# Patient Record
Sex: FEMALE | Race: WHITE | NOT HISPANIC OR LATINO | Employment: OTHER | URBAN - NONMETROPOLITAN AREA
[De-identification: names, ages, dates, MRNs, and addresses within clinical notes are randomized per-mention and may not be internally consistent; named-entity substitution may affect disease eponyms.]

---

## 2017-02-09 ENCOUNTER — OFFICE VISIT (OUTPATIENT)
Dept: VASCULAR SURGERY | Facility: CLINIC | Age: 67
End: 2017-02-09

## 2017-02-09 VITALS
SYSTOLIC BLOOD PRESSURE: 128 MMHG | DIASTOLIC BLOOD PRESSURE: 76 MMHG | WEIGHT: 210 LBS | HEIGHT: 66 IN | HEART RATE: 88 BPM | BODY MASS INDEX: 33.75 KG/M2

## 2017-02-09 DIAGNOSIS — E78.5 HYPERLIPIDEMIA, UNSPECIFIED HYPERLIPIDEMIA TYPE: ICD-10-CM

## 2017-02-09 DIAGNOSIS — I10 ESSENTIAL HYPERTENSION: ICD-10-CM

## 2017-02-09 DIAGNOSIS — I65.23 BILATERAL CAROTID ARTERY STENOSIS: Primary | ICD-10-CM

## 2017-02-09 PROCEDURE — 99204 OFFICE O/P NEW MOD 45 MIN: CPT | Performed by: NURSE PRACTITIONER

## 2017-02-09 RX ORDER — ATORVASTATIN CALCIUM 10 MG/1
10 TABLET, FILM COATED ORAL DAILY
COMMUNITY
Start: 2016-12-19

## 2017-02-09 RX ORDER — NITROFURANTOIN 25; 75 MG/1; MG/1
CAPSULE ORAL
COMMUNITY
Start: 2017-02-06 | End: 2020-02-05

## 2017-02-09 RX ORDER — INDAPAMIDE 2.5 MG/1
1.25 TABLET, FILM COATED ORAL DAILY
COMMUNITY
Start: 2016-12-13 | End: 2018-02-12 | Stop reason: DRUGHIGH

## 2017-02-09 RX ORDER — CYCLOBENZAPRINE HCL 5 MG
5 TABLET ORAL 2 TIMES DAILY PRN
COMMUNITY
Start: 2016-11-10

## 2017-02-09 RX ORDER — MULTIVIT-MIN/IRON/FOLIC ACID/K 18-600-40
CAPSULE ORAL
COMMUNITY
End: 2022-11-28

## 2017-02-09 RX ORDER — ROPINIROLE 1 MG/1
1 TABLET, FILM COATED ORAL NIGHTLY
COMMUNITY
Start: 2017-01-05

## 2017-02-09 RX ORDER — VALSARTAN 320 MG/1
320 TABLET ORAL
COMMUNITY
Start: 2017-01-24 | End: 2019-01-30 | Stop reason: ALTCHOICE

## 2017-02-09 RX ORDER — PANTOPRAZOLE SODIUM 40 MG/1
TABLET, DELAYED RELEASE ORAL
COMMUNITY
Start: 2017-01-19 | End: 2018-02-12 | Stop reason: ALTCHOICE

## 2017-02-09 RX ORDER — NAPROXEN SODIUM 220 MG
220 TABLET ORAL EVERY 12 HOURS PRN
COMMUNITY
End: 2018-02-12 | Stop reason: ALTCHOICE

## 2017-02-09 RX ORDER — ASPIRIN 81 MG/1
TABLET ORAL
COMMUNITY
Start: 2017-01-24

## 2017-02-09 RX ORDER — SITAGLIPTIN 50 MG/1
50 TABLET, FILM COATED ORAL DAILY
COMMUNITY
Start: 2016-12-28 | End: 2021-09-20

## 2017-02-09 RX ORDER — CELECOXIB 200 MG/1
CAPSULE ORAL
COMMUNITY
Start: 2016-12-28 | End: 2018-02-12 | Stop reason: ALTCHOICE

## 2017-02-09 NOTE — PROGRESS NOTES
02/09/2017      GUNNAR Dominguez  [address] [place] [address]    Radha Rodriguez  1950    Chief Complaint   Patient presents with   • Carotid Artery Disease     Denies COD symptoms       Dear GUNNAR Dominguez:      HPI  I had the pleasure of seeing your patient Radha Rodriguez in the office today.  Thank you kindly for this consultation.  As you recall, Radha Rodriguez is a 67 y.o.  female who you are currently following for routine health maintenance.  She was having an annual exam and noticed to have a soft right carotid bruit, and was sent for testing.  She denies any strokelike symptoms.  She denies any history of stroke.  She did have noninvasive testing performed, which I did review in office.    Past Medical History   Diagnosis Date   • Anemia    • Carotid bruit    • Diabetes mellitus    • Hyperlipidemia    • Hypertension        Past Surgical History   Procedure Laterality Date   • Colonoscopy     • Total knee arthroplasty Right    • Ankle surgery Left      fracture repair       Family History   Problem Relation Age of Onset   • Cancer Mother      colon   • Diabetes Brother        Social History     Social History   • Marital status:      Spouse name: N/A   • Number of children: N/A   • Years of education: N/A     Occupational History   • Not on file.     Social History Main Topics   • Smoking status: Never Smoker   • Smokeless tobacco: Never Used   • Alcohol use No   • Drug use: No   • Sexual activity: Not on file     Other Topics Concern   • Not on file     Social History Narrative       No Known Allergies    Prior to Admission medications    Medication Sig Start Date End Date Taking? Authorizing Provider   aspirin 81 MG EC tablet  1/24/17  Yes Historical Provider, MD   atorvastatin (LIPITOR) 10 MG tablet  12/19/16  Yes Historical Provider, MD   celecoxib (CeleBREX) 200 MG capsule  12/28/16  Yes Historical Provider, MD   Cholecalciferol (VITAMIN D) 2000 UNITS capsule Take  by mouth.   Yes  "Historical Provider, MD   Coenzyme Q10 (CO Q 10 PO) Take  by mouth.   Yes Historical Provider, MD   cyclobenzaprine (FLEXERIL) 5 MG tablet  11/10/16  Yes Historical Provider, MD   indapamide (LOZOL) 2.5 MG tablet  12/13/16  Yes Historical Provider, MD   JANUVIA 100 MG tablet  12/28/16  Yes Historical Provider, MD   metFORMIN (GLUCOPHAGE) 500 MG tablet  2/6/17  Yes Historical Provider, MD   naproxen sodium (ALEVE) 220 MG tablet Take 220 mg by mouth Every 12 (Twelve) Hours As Needed for mild pain (1-3).   Yes Historical Provider, MD   nitrofurantoin, macrocrystal-monohydrate, (MACROBID) 100 MG capsule  2/6/17  Yes Historical Provider, MD   pantoprazole (PROTONIX) 40 MG EC tablet  1/19/17  Yes Historical Provider, MD   Probiotic Product (PROBIOTIC PO) Take  by mouth.   Yes Historical Provider, MD   rOPINIRole (REQUIP) 1 MG tablet  1/5/17  Yes Historical Provider, MD   valsartan (DIOVAN) 320 MG tablet  1/24/17  Yes Historical Provider, MD       Review of Systems   Constitutional: Negative.    HENT: Negative.    Eyes: Negative.    Respiratory: Negative.    Cardiovascular: Negative.    Gastrointestinal: Negative.    Endocrine: Negative.    Genitourinary: Negative.    Musculoskeletal: Negative.    Skin: Negative.    Allergic/Immunologic: Negative.    Neurological: Negative.    Hematological: Negative.    Psychiatric/Behavioral: Negative.        Visit Vitals   • /76   • Pulse 88   • Ht 66\" (167.6 cm)   • Wt 210 lb (95.3 kg)   • BMI 33.89 kg/m2     Physical Exam   Constitutional: She is oriented to person, place, and time. She appears well-developed and well-nourished. No distress.   HENT:   Head: Normocephalic and atraumatic.   Mouth/Throat: Oropharynx is clear and moist.   Eyes: Pupils are equal, round, and reactive to light. No scleral icterus.   Neck: Normal range of motion. Neck supple. No JVD present. Carotid bruit is not present. No thyromegaly present.   Cardiovascular: Normal rate, regular rhythm, S2 normal, " normal heart sounds, intact distal pulses and normal pulses.  Exam reveals no gallop and no friction rub.    No murmur heard.  Pulmonary/Chest: Effort normal and breath sounds normal.   Abdominal: Soft. Normal aorta and bowel sounds are normal. There is no hepatosplenomegaly.   Musculoskeletal: Normal range of motion.   Neurological: She is alert and oriented to person, place, and time. No cranial nerve deficit.   Skin: Skin is warm and dry. She is not diaphoretic.   Psychiatric: She has a normal mood and affect. Her behavior is normal. Judgment and thought content normal.   Nursing note and vitals reviewed.      Diagnostic Data: Noninvasive testing shows carotid stenosis in the 50-59% bilaterally with bilateral antegrade vertebral flow.       Patient Active Problem List   Diagnosis   • Hypertension   • Hyperlipidemia   • Diabetes mellitus   • Carotid bruit         ICD-10-CM ICD-9-CM   1. Bilateral carotid artery stenosis I65.23 433.10     433.30   2. Essential hypertension I10 401.9   3. Hyperlipidemia, unspecified hyperlipidemia type E78.5 272.4       Plan: After thoroughly evaluating Radha Rodriguez, I believe the best course of action is to remain conservative from a vascular standpoint.  She is asymptomatic from a strokelike standpoint.  I do not appreciate a carotid bruit.  She should continue taking aspirin and lipitor.  I will see her back in 1 year with repeat noninvasive testing for continued surveillance.  This was all discussed in full with complete understanding.     Thank you for allowing me to participate in the care of your patient.  Please do not hesitate with any questions or concerns.  I will keep you aware of any further encounters with Radha Rodriguez.        Sincerely yours,         GUNNAR Damon APRN

## 2017-12-26 ENCOUNTER — HOSPITAL ENCOUNTER (OUTPATIENT)
Dept: NEUROLOGY | Age: 67
Discharge: HOME | End: 2017-12-26
Payer: MEDICARE

## 2017-12-26 PROCEDURE — 95911 NRV CNDJ TEST 9-10 STUDIES: CPT

## 2017-12-26 PROCEDURE — 95911 NRV CNDJ TEST 9-10 STUDIES: CPT | Performed by: PSYCHIATRY & NEUROLOGY

## 2018-01-09 NOTE — PROCEDURES
[facility] OF [facility] [address], [facility]                               ELECTROMYOGRAM REPORT    PATIENT NAME: Xuan Schaffer                       :        1950  MED REC NO:   [id number]                              ROOM:  ACCOUNT NO:   [de-identified]                           ADMIT DATE: 2017  PROVIDER:     Ayla Villatoro MD    DATE OF EM2017    NERVE CONDUCTION STUDIES BILATERAL UPPER EXTREMITIES    SUMMARY:  Nerve conduction studies of the bilateral upper extremities show  a low amplitude right radial and left ulnar response. The bilateral median  SNAPs are low in amplitude with significantly prolonged latencies. The  bilateral median motor distal latencies are significantly prolonged as  well. Bilateral median F-waves are prolonged. IMPRESSION:  Severe bilateral carpal tunnel syndrome, otherwise relatively  unremarkable study. Correlate clinically.         Anatoliy Steven MD    D: 2018 11:45:10       T: 2018 11:47:31     [email or web address]  Job#: [id number]     Doc#: [id number]    CC:

## 2018-02-05 ENCOUNTER — HOSPITAL ENCOUNTER (OUTPATIENT)
Dept: ULTRASOUND IMAGING | Facility: HOSPITAL | Age: 68
Discharge: HOME | End: 2018-02-05
Admitting: NURSE PRACTITIONER

## 2018-02-05 DIAGNOSIS — I65.23 BILATERAL CAROTID ARTERY STENOSIS: ICD-10-CM

## 2018-02-05 PROCEDURE — 93880 EXTRACRANIAL BILAT STUDY: CPT

## 2018-02-05 PROCEDURE — 93880 EXTRACRANIAL BILAT STUDY: CPT | Performed by: SURGERY

## 2018-02-12 ENCOUNTER — OFFICE VISIT (OUTPATIENT)
Dept: VASCULAR SURGERY | Facility: CLINIC | Age: 68
End: 2018-02-12

## 2018-02-12 VITALS
WEIGHT: 206 LBS | HEIGHT: 65 IN | SYSTOLIC BLOOD PRESSURE: 152 MMHG | DIASTOLIC BLOOD PRESSURE: 82 MMHG | BODY MASS INDEX: 34.32 KG/M2 | HEART RATE: 89 BPM | OXYGEN SATURATION: 98 %

## 2018-02-12 DIAGNOSIS — E78.5 HYPERLIPIDEMIA, UNSPECIFIED HYPERLIPIDEMIA TYPE: ICD-10-CM

## 2018-02-12 DIAGNOSIS — I10 ESSENTIAL HYPERTENSION: ICD-10-CM

## 2018-02-12 DIAGNOSIS — I65.23 BILATERAL CAROTID ARTERY STENOSIS: Primary | ICD-10-CM

## 2018-02-12 PROCEDURE — 99213 OFFICE O/P EST LOW 20 MIN: CPT | Performed by: NURSE PRACTITIONER

## 2018-02-12 RX ORDER — OMEPRAZOLE 20 MG/1
20 CAPSULE, DELAYED RELEASE ORAL DAILY
COMMUNITY
Start: 2018-01-29

## 2018-02-12 RX ORDER — SUCRALFATE 1 G/1
1 TABLET ORAL 4 TIMES DAILY
COMMUNITY
Start: 2018-01-29 | End: 2019-01-30 | Stop reason: ALTCHOICE

## 2018-02-12 RX ORDER — INDAPAMIDE 1.25 MG/1
1.25 TABLET, FILM COATED ORAL DAILY
COMMUNITY
Start: 2018-01-16

## 2018-02-12 RX ORDER — LEVOCETIRIZINE DIHYDROCHLORIDE 5 MG/1
5 TABLET, FILM COATED ORAL AS NEEDED
COMMUNITY

## 2018-02-12 RX ORDER — IRON POLYSACCHARIDE COMPLEX 150 MG
150 CAPSULE ORAL DAILY
COMMUNITY
End: 2022-11-28

## 2018-02-12 RX ORDER — RANITIDINE 300 MG/1
TABLET ORAL
COMMUNITY
Start: 2018-01-29 | End: 2018-02-12

## 2018-02-12 RX ORDER — SENNOSIDES 8.6 MG
650 CAPSULE ORAL EVERY 8 HOURS PRN
COMMUNITY

## 2018-02-12 NOTE — PROGRESS NOTES
"02/09/2017      Joceline Murguia, APRROSELIA  [address] [place] [address]    Radha Rodriguez  1950    Chief Complaint   Patient presents with   • Follow-up     Naif Carotid Artery Stenosis with testing done on 02/09/2018. Patient denies any stroke like symptoms.       Dear Joceline Murguia, GUNNAR:      HPI  I had the pleasure of seeing your patient Radha Rodriguez in the office today.   As you recall, Radha Rodriguez is a 68 y.o.  female who you are currently following for routine health maintenance.  She was having an annual exam and noticed to have a soft right carotid bruit, and was sent for testing.  She denies any strokelike symptoms.  She denies any history of stroke.  She reports being diagnosed with ulcers and carpal tunnel since her last visit, otherwise doing fine.  She states her diabetes is well controlled.  She denies any claudicating symptoms to her lower extremities.  She did have noninvasive testing performed, which I did review in office.      Review of Systems   Constitutional: Negative.    HENT: Negative.    Eyes: Negative.    Respiratory: Negative.    Cardiovascular: Negative.    Gastrointestinal: Negative.    Endocrine: Negative.    Genitourinary: Negative.    Musculoskeletal: Negative.    Skin: Negative.    Allergic/Immunologic: Negative.    Neurological: Negative.    Hematological: Negative.    Psychiatric/Behavioral: Negative.        /82  Pulse 89  Ht 165.1 cm (65\")  Wt 93.4 kg (206 lb)  SpO2 98%  BMI 34.28 kg/m2  Physical Exam   Constitutional: She is oriented to person, place, and time. She appears well-developed and well-nourished. No distress.   HENT:   Head: Normocephalic and atraumatic.   Mouth/Throat: Oropharynx is clear and moist.   Eyes: Pupils are equal, round, and reactive to light. No scleral icterus.   Neck: Normal range of motion. Neck supple. No JVD present. Carotid bruit is not present. No thyromegaly present.   Cardiovascular: Normal rate, regular rhythm, S2 normal, " normal heart sounds, intact distal pulses and normal pulses.  Exam reveals no gallop and no friction rub.    No murmur heard.  Pulmonary/Chest: Effort normal and breath sounds normal.   Abdominal: Soft. Normal aorta and bowel sounds are normal. There is no hepatosplenomegaly.   Musculoskeletal: Normal range of motion.   Neurological: She is alert and oriented to person, place, and time. No cranial nerve deficit.   Skin: Skin is warm and dry. She is not diaphoretic.   Psychiatric: She has a normal mood and affect. Her behavior is normal. Judgment and thought content normal.   Nursing note and vitals reviewed.    Diagnostic Data:  Us Carotid Bilateral    Result Date: 2/5/2018  Narrative: History: Carotid occlusive disease      Impression: Impression: 1. There is 50-69% stenosis of the right internal carotid artery. 2. There is less than 50% stenosis of the left internal carotid artery. 3. Antegrade flow is demonstrated in bilateral vertebral arteries.  Comments: Bilateral carotid vertebral arterial duplex scan was performed.  Grayscale imaging shows intimal thickening and calcified elements at the carotid bifurcation. The right internal carotid artery peak systolic velocity is 134 cm/sec. The end-diastolic velocity is 45.6 cm/sec. The right ICA/CCA ratio is approximately 1.16 . These findings correlate with 50-69% stenosis of the right internal carotid artery.  Grayscale imaging shows intimal thickening and calcified elements at the carotid bifurcation. The left internal carotid artery peak systolic velocity is 113 cm/sec. The end-diastolic velocity is 42.4 cm/sec. The left ICA/CCA ratio is approximately 1.06 . These findings correlate with less than 50% stenosis of the left internal carotid artery.  Antegrade flow is demonstrated in bilateral vertebral arteries.    This report was finalized on 02/05/2018 13:17 by Dr. Mario Saldana MD.        Patient Active Problem List   Diagnosis   • Hypertension   •  Hyperlipidemia   • Diabetes mellitus   • Carotid bruit         ICD-10-CM ICD-9-CM   1. Bilateral carotid artery stenosis I65.23 433.10     433.30   2. Essential hypertension I10 401.9   3. Hyperlipidemia, unspecified hyperlipidemia type E78.5 272.4       Plan: After thoroughly evaluating Radha Rodriguez, I believe the best course of action is to remain conservative from a vascular standpoint.  She is asymptomatic from a strokelike standpoint and her testing is unchanged.  She should continue taking aspirin and lipitor.  I will see her back in 1 year with repeat noninvasive testing for continued surveillance, including a carotid duplex.  I did discuss vascular risk factors as they pertain to the progression of vascular disease including controlling her hypertension, diabetes mellitus, and hyperlipidemia.  This was all discussed in full with complete understanding.     Thank you for allowing me to participate in the care of your patient.  Please do not hesitate with any questions or concerns.  I will keep you aware of any further encounters with Radha Rodriguez.        Sincerely yours,         GUNNAR Damon APRN

## 2019-01-30 ENCOUNTER — APPOINTMENT (OUTPATIENT)
Dept: GENERAL RADIOLOGY | Facility: HOSPITAL | Age: 69
End: 2019-01-30

## 2019-01-30 ENCOUNTER — OFFICE VISIT (OUTPATIENT)
Dept: VASCULAR SURGERY | Facility: CLINIC | Age: 69
End: 2019-01-30

## 2019-01-30 ENCOUNTER — HOSPITAL ENCOUNTER (OUTPATIENT)
Dept: ULTRASOUND IMAGING | Facility: HOSPITAL | Age: 69
Discharge: HOME | End: 2019-01-30
Admitting: NURSE PRACTITIONER

## 2019-01-30 ENCOUNTER — HOSPITAL ENCOUNTER (EMERGENCY)
Facility: HOSPITAL | Age: 69
Discharge: HOME | End: 2019-01-30
Attending: EMERGENCY MEDICINE | Admitting: EMERGENCY MEDICINE

## 2019-01-30 VITALS
BODY MASS INDEX: 32.62 KG/M2 | HEART RATE: 89 BPM | OXYGEN SATURATION: 99 % | WEIGHT: 203 LBS | TEMPERATURE: 98.6 F | HEIGHT: 66 IN | DIASTOLIC BLOOD PRESSURE: 89 MMHG | SYSTOLIC BLOOD PRESSURE: 135 MMHG | RESPIRATION RATE: 16 BRPM

## 2019-01-30 VITALS
OXYGEN SATURATION: 98 % | BODY MASS INDEX: 35.02 KG/M2 | WEIGHT: 210.2 LBS | DIASTOLIC BLOOD PRESSURE: 70 MMHG | HEIGHT: 65 IN | HEART RATE: 93 BPM | SYSTOLIC BLOOD PRESSURE: 130 MMHG

## 2019-01-30 DIAGNOSIS — E78.5 HYPERLIPIDEMIA, UNSPECIFIED HYPERLIPIDEMIA TYPE: ICD-10-CM

## 2019-01-30 DIAGNOSIS — I10 ESSENTIAL HYPERTENSION: ICD-10-CM
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
## 2019-01-30 DIAGNOSIS — S00.83XA CONTUSION OF FOREHEAD, INITIAL ENCOUNTER: ICD-10-CM

## 2019-01-30 DIAGNOSIS — I65.23 BILATERAL CAROTID ARTERY STENOSIS: ICD-10-CM

## 2019-01-30 DIAGNOSIS — S83.91XA SPRAIN OF RIGHT KNEE, UNSPECIFIED LIGAMENT, INITIAL ENCOUNTER: ICD-10-CM

## 2019-01-30 DIAGNOSIS — I65.23 BILATERAL CAROTID ARTERY STENOSIS: Primary | ICD-10-CM

## 2019-01-30 PROCEDURE — 73562 X-RAY EXAM OF KNEE 3: CPT

## 2019-01-30 PROCEDURE — 93880 EXTRACRANIAL BILAT STUDY: CPT

## 2019-01-30 PROCEDURE — 99214 OFFICE O/P EST MOD 30 MIN: CPT | Performed by: NURSE PRACTITIONER

## 2019-01-30 PROCEDURE — 93880 EXTRACRANIAL BILAT STUDY: CPT | Performed by: SURGERY

## 2019-01-30 PROCEDURE — 99283 EMERGENCY DEPT VISIT LOW MDM: CPT

## 2019-01-30 RX ORDER — LOSARTAN POTASSIUM 100 MG/1
100 TABLET ORAL DAILY
COMMUNITY
Start: 2019-01-18

## 2019-01-30 NOTE — PROGRESS NOTES
"1/30/2019       Joceline Murguia APRN   [address] [facility] [address]    Radha Rodriguez  1950    Chief Complaint   Patient presents with   • Follow-up     1 year f/u with testing       Dear Joceline Murguia APRN       HPI  I had the pleasure of seeing your patient Radha Rodriguez in the office today.   As you recall, Radha Rodriguez is a 69 y.o.  female who we are following for asymptomatic carotid disease.  She denies any strokelike symptoms.     She states her diabetes is well controlled.  She denies any claudicating symptoms to her lower extremities.  She did have a fall after her carotid duplex today. She denies any nausea.  She does have a knot to her forehead on the right.  Over [place] she fell after tripping over something.  She is neurologically intact.  She is maintained on aspirin and Lipitor.  She did have noninvasive testing performed, which I did review in office.      Review of Systems   Constitutional: Negative.    HENT: Negative.    Eyes: Negative.    Respiratory: Negative.    Cardiovascular: Negative.    Gastrointestinal: Negative.    Endocrine: Negative.    Genitourinary: Negative.    Musculoskeletal: Negative.    Skin: Negative.    Allergic/Immunologic: Negative.    Neurological: Negative.         Fall after leaving her test   Hematological: Negative.    Psychiatric/Behavioral: Negative.        /70 (BP Location: Left arm, Patient Position: Sitting)   Pulse 93   Ht 165.1 cm (65\")   Wt 95.3 kg (210 lb 3.2 oz)   SpO2 98%   BMI 34.98 kg/m²   Physical Exam   Constitutional: She is oriented to person, place, and time. She appears well-developed and well-nourished. No distress.   HENT:   Head: Normocephalic and atraumatic.       Mouth/Throat: Oropharynx is clear and moist.   Eyes: Pupils are equal, round, and reactive to light. No scleral icterus.   Neck: Normal range of motion. Neck supple. No JVD present. Carotid bruit is not present. No thyromegaly present.   Cardiovascular: Normal " rate, regular rhythm, S2 normal, normal heart sounds, intact distal pulses and normal pulses. Exam reveals no gallop and no friction rub.   No murmur heard.  Pulmonary/Chest: Effort normal and breath sounds normal.   Abdominal: Soft. Normal aorta and bowel sounds are normal. There is no hepatosplenomegaly.   Musculoskeletal: Normal range of motion.   Neurological: She is alert and oriented to person, place, and time. No cranial nerve deficit.   Skin: Skin is warm and dry. She is not diaphoretic.   Psychiatric: She has a normal mood and affect. Her behavior is normal. Judgment and thought content normal.   Nursing note and vitals reviewed.    Diagnostic Data:  Us Carotid Bilateral    Result Date: 1/30/2019  Narrative: History: Carotid occlusive disease      Impression: Impression: 1. There is 50-69% stenosis of the right internal carotid artery. 2. There is less than 50% stenosis of the left internal carotid artery. 3. Antegrade flow is demonstrated in bilateral vertebral arteries.  Comments: Bilateral carotid vertebral arterial duplex scan was performed.  Grayscale imaging shows intimal thickening and calcified elements at the carotid bifurcation. The right internal carotid artery peak systolic velocity is 138 cm/sec. The end-diastolic velocity is 47.1 cm/sec. The right ICA/CCA ratio is approximately 1.13 . These findings correlate with 50-69% stenosis of the right internal carotid artery.  Grayscale imaging shows intimal thickening and calcified elements at the carotid bifurcation. The left internal carotid artery peak systolic velocity is 119 cm/sec. The end-diastolic velocity is 42.4 cm/sec. The left ICA/CCA ratio is approximately 1.01 . These findings correlate with less than 50% stenosis of the left internal carotid artery.  Antegrade flow is demonstrated in bilateral vertebral arteries.    This report was finalized on 01/30/2019 11:36 by Dr. Sixto Dobson MD.         Patient Active Problem List   Diagnosis    • Hypertension   • Hyperlipidemia   • Diabetes mellitus (CMS/[facility])   • Carotid bruit         ICD-10-CM ICD-9-CM   1. Bilateral carotid artery stenosis I65.23 433.10     433.30   2. Essential hypertension I10 401.9   3. Hyperlipidemia, unspecified hyperlipidemia type E78.5 272.4       Plan: After thoroughly evaluating Radha Rodriguez, I believe the best course of action is to remain conservative from a vascular standpoint.  She is asymptomatic from a strokelike standpoint and her testing is unchanged.  She should continue taking aspirin and lipitor.  She did have a fall after her test and has a knot to her forehead on the right.  She appears neurologically intact however per policy, she will need to be evaluated.  She will be transported to ED by security.  I will see her back in 1 year with repeat noninvasive testing for continued surveillance, including a carotid duplex.  I did discuss vascular risk factors as they pertain to the progression of vascular disease including controlling her hypertension, diabetes mellitus, and hyperlipidemia.  This was all discussed in full with complete understanding.     Thank you for allowing me to participate in the care of your patient.  Please do not hesitate with any questions or concerns.  I will keep you aware of any further encounters with Radha Rodriguez.        Sincerely yours,         Debbie Reeder, Joceline Womack APRN

## 2020-01-17 VITALS
HEIGHT: 65 IN | BODY MASS INDEX: 32.65 KG/M2 | SYSTOLIC BLOOD PRESSURE: 142 MMHG | DIASTOLIC BLOOD PRESSURE: 64 MMHG | HEART RATE: 87 BPM | WEIGHT: 196 LBS

## 2020-01-21 ENCOUNTER — OFFICE VISIT (OUTPATIENT)
Dept: HEMATOLOGY | Age: 70
End: 2020-01-21
Payer: MEDICARE

## 2020-01-21 VITALS
DIASTOLIC BLOOD PRESSURE: 70 MMHG | RESPIRATION RATE: 16 BRPM | BODY MASS INDEX: 30.7 KG/M2 | HEIGHT: 66 IN | WEIGHT: 191 LBS | HEART RATE: 92 BPM | SYSTOLIC BLOOD PRESSURE: 156 MMHG

## 2020-01-21 PROCEDURE — G8484 FLU IMMUNIZE NO ADMIN: HCPCS | Performed by: INTERNAL MEDICINE

## 2020-01-21 PROCEDURE — 1090F PRES/ABSN URINE INCON ASSESS: CPT | Performed by: INTERNAL MEDICINE

## 2020-01-21 PROCEDURE — 1036F TOBACCO NON-USER: CPT | Performed by: INTERNAL MEDICINE

## 2020-01-21 PROCEDURE — 99213 OFFICE O/P EST LOW 20 MIN: CPT | Performed by: INTERNAL MEDICINE

## 2020-01-21 PROCEDURE — G8427 DOCREV CUR MEDS BY ELIG CLIN: HCPCS | Performed by: INTERNAL MEDICINE

## 2020-01-21 PROCEDURE — G8400 PT W/DXA NO RESULTS DOC: HCPCS | Performed by: INTERNAL MEDICINE

## 2020-01-21 PROCEDURE — 1123F ACP DISCUSS/DSCN MKR DOCD: CPT | Performed by: INTERNAL MEDICINE

## 2020-01-21 PROCEDURE — 4040F PNEUMOC VAC/ADMIN/RCVD: CPT | Performed by: INTERNAL MEDICINE

## 2020-01-21 PROCEDURE — 3017F COLORECTAL CA SCREEN DOC REV: CPT | Performed by: INTERNAL MEDICINE

## 2020-01-21 PROCEDURE — G8419 CALC BMI OUT NRM PARAM NOF/U: HCPCS | Performed by: INTERNAL MEDICINE

## 2020-01-21 RX ORDER — TOLTERODINE 4 MG/1
4 CAPSULE, EXTENDED RELEASE ORAL NIGHTLY
COMMUNITY
Start: 2019-10-22

## 2020-01-21 RX ORDER — UBIDECARENONE 10 MG
200 CAPSULE ORAL DAILY
COMMUNITY

## 2020-01-21 RX ORDER — CYCLOBENZAPRINE HCL 5 MG
5 TABLET ORAL 2 TIMES DAILY
COMMUNITY
Start: 2016-11-10

## 2020-01-21 RX ORDER — ROPINIROLE 1 MG/1
1 TABLET, FILM COATED ORAL NIGHTLY
COMMUNITY
Start: 2019-12-28

## 2020-01-21 RX ORDER — INDAPAMIDE 1.25 MG/1
1.25 TABLET, FILM COATED ORAL DAILY
COMMUNITY
Start: 2019-10-22

## 2020-01-21 RX ORDER — IRON POLYSACCHARIDE COMPLEX 150 MG
150 CAPSULE ORAL DAILY
COMMUNITY

## 2020-01-21 RX ORDER — LOSARTAN POTASSIUM 100 MG/1
100 TABLET ORAL DAILY
COMMUNITY
Start: 2019-01-18

## 2020-01-21 RX ORDER — SITAGLIPTIN 50 MG/1
50 TABLET, FILM COATED ORAL
COMMUNITY
Start: 2020-01-06 | End: 2022-07-11

## 2020-01-21 RX ORDER — LEVOCETIRIZINE DIHYDROCHLORIDE 5 MG/1
5 TABLET, FILM COATED ORAL DAILY PRN
COMMUNITY

## 2020-01-21 RX ORDER — RANITIDINE 300 MG/1
300 TABLET ORAL DAILY
COMMUNITY
End: 2020-08-04

## 2020-01-21 RX ORDER — ATORVASTATIN CALCIUM 10 MG/1
10 TABLET, FILM COATED ORAL EVERY EVENING
COMMUNITY
Start: 2016-12-19

## 2020-01-21 RX ORDER — ACETAMINOPHEN 160 MG
2000 TABLET,DISINTEGRATING ORAL DAILY
COMMUNITY
End: 2022-09-20

## 2020-01-21 SDOH — HEALTH STABILITY: MENTAL HEALTH: HOW OFTEN DO YOU HAVE A DRINK CONTAINING ALCOHOL?: NEVER

## 2020-01-27 ENCOUNTER — HOSPITAL ENCOUNTER (OUTPATIENT)
Dept: ULTRASOUND IMAGING | Facility: HOSPITAL | Age: 70
Discharge: HOME | End: 2020-01-27
Admitting: NURSE PRACTITIONER

## 2020-01-27 DIAGNOSIS — I65.23 BILATERAL CAROTID ARTERY STENOSIS: ICD-10-CM

## 2020-01-27 PROCEDURE — 93880 EXTRACRANIAL BILAT STUDY: CPT

## 2020-01-27 PROCEDURE — 93880 EXTRACRANIAL BILAT STUDY: CPT | Performed by: SURGERY

## 2020-01-29 ENCOUNTER — TELEPHONE (OUTPATIENT)
Dept: VASCULAR SURGERY | Facility: CLINIC | Age: 70
End: 2020-01-29

## 2020-01-29 NOTE — TELEPHONE ENCOUNTER
Tried calling Mrs Rodriguez to remind her of her appointment for Thursday, January 30th, 2020 at 1045 am with Dr Saldana.     When calling no answer

## 2020-01-30 ENCOUNTER — OFFICE VISIT (OUTPATIENT)
Dept: VASCULAR SURGERY | Facility: CLINIC | Age: 70
End: 2020-01-30

## 2020-01-30 VITALS
SYSTOLIC BLOOD PRESSURE: 142 MMHG | BODY MASS INDEX: 30.7 KG/M2 | OXYGEN SATURATION: 100 % | HEART RATE: 94 BPM | WEIGHT: 191 LBS | DIASTOLIC BLOOD PRESSURE: 86 MMHG | HEIGHT: 66 IN

## 2020-01-30 DIAGNOSIS — E78.5 HYPERLIPIDEMIA, UNSPECIFIED HYPERLIPIDEMIA TYPE: ICD-10-CM

## 2020-01-30 DIAGNOSIS — I65.23 BILATERAL CAROTID ARTERY STENOSIS: Primary | ICD-10-CM

## 2020-01-30 DIAGNOSIS — I10 ESSENTIAL HYPERTENSION: ICD-10-CM

## 2020-01-30 PROCEDURE — 99214 OFFICE O/P EST MOD 30 MIN: CPT | Performed by: NURSE PRACTITIONER

## 2020-01-30 RX ORDER — TOLTERODINE 4 MG/1
4 CAPSULE, EXTENDED RELEASE ORAL NIGHTLY
COMMUNITY

## 2020-02-05 NOTE — PROGRESS NOTES
"01/30/2020       Geovanna Caceres, GUNNAR   [address] [facility] [address]    Radha Rodriguez  1950    Chief Complaint   Patient presents with   • Follow-up     1 Year Follow Up For Bilateral Carotid Artery Stenosis. Test [id number] US pad carotid bilateral. Patient denies any stroke like symptoms.        Dear Geovanna Caceres, GUNNAR       HPI  I had the pleasure of seeing your patient Radha Rodriguez in the office today.   As you recall, Radha Rodriguez is a 70 y.o.  female who we are following for asymptomatic carotid disease.  She denies any strokelike symptoms.  She states her diabetes is well controlled.  She denies any claudicating symptoms to her lower extremities.  She is maintained on aspirin and Lipitor.  She did have noninvasive testing performed today, which I did review in office.        Review of Systems   Constitutional: Negative.    HENT: Negative.    Eyes: Negative.    Respiratory: Negative.    Cardiovascular: Negative.    Gastrointestinal: Negative.    Endocrine: Negative.    Genitourinary: Negative.    Musculoskeletal: Negative.    Skin: Negative.    Allergic/Immunologic: Negative.    Neurological: Negative.    Hematological: Negative.    Psychiatric/Behavioral: Negative.        /86 (BP Location: Left arm, Patient Position: Sitting, Cuff Size: Adult)   Pulse 94   Ht 167.6 cm (66\")   Wt 86.6 kg (191 lb)   SpO2 100%   BMI 30.83 kg/m²   Physical Exam   Constitutional: She is oriented to person, place, and time. She appears well-developed and well-nourished. No distress.   HENT:   Head: Normocephalic and atraumatic.   Mouth/Throat: Oropharynx is clear and moist.   Eyes: Pupils are equal, round, and reactive to light. No scleral icterus.   Neck: Normal range of motion. Neck supple. No JVD present. Carotid bruit is not present. No thyromegaly present.   Cardiovascular: Normal rate, regular rhythm, S2 normal, normal heart sounds, intact distal pulses and normal pulses. Exam reveals no " gallop and no friction rub.   No murmur heard.  Pulmonary/Chest: Effort normal and breath sounds normal.   Abdominal: Soft. Normal aorta and bowel sounds are normal. There is no hepatosplenomegaly.   Musculoskeletal: Normal range of motion.   Neurological: She is alert and oriented to person, place, and time. No cranial nerve deficit.   Skin: Skin is warm and dry. She is not diaphoretic.   Psychiatric: She has a normal mood and affect. Her behavior is normal. Judgment and thought content normal.   Nursing note and vitals reviewed.    Diagnostic Data:  Us Carotid Bilateral    Result Date: 1/28/2020  Narrative: History: Carotid occlusive disease      Impression: Impression: 1. There is less than 50% stenosis of the right internal carotid artery. 2. There is less than 50% stenosis of the left internal carotid artery. 3. Antegrade flow is demonstrated in bilateral vertebral arteries.  Comments: Bilateral carotid vertebral arterial duplex scan was performed.  Grayscale imaging shows intimal thickening and calcified elements at the carotid bifurcation. The right internal carotid artery peak systolic velocity is 121 cm/sec. The end-diastolic velocity is 41.6 cm/sec. The right ICA/CCA ratio is approximately 0.95 . These findings correlate with less than 50% stenosis of the right internal carotid artery.  Grayscale imaging shows intimal thickening and calcified elements at the carotid bifurcation. The left internal carotid artery peak systolic velocity is 110 cm/sec. The end-diastolic velocity is 29.9 cm/sec. The left ICA/CCA ratio is approximately 0.92 . These findings correlate with less than 50% stenosis of the left internal carotid artery.  Antegrade flow is demonstrated in bilateral vertebral arteries. Greater than 50% stenosis of the right common carotid artery. This report was finalized on 01/28/2020 13:47 by Dr. Sixto Dobson MD.         Patient Active Problem List   Diagnosis   • Hypertension   • Hyperlipidemia   •  Diabetes mellitus (CMS/[facility])   • Carotid bruit         ICD-10-CM ICD-9-CM   1. Bilateral carotid artery stenosis I65.23 433.10     433.30   2. Essential hypertension I10 401.9   3. Hyperlipidemia, unspecified hyperlipidemia type E78.5 272.4       Plan: After thoroughly evaluating Radha Rodriguez, I believe the best course of action is to remain conservative from vascular surgery standpoint.  I did review her testing which remains less than 50% stenosis bilaterally.  She denies any strokelike symptoms.  We will see her back in 1 year with repeat noninvasive testing for continued surveillance, including a carotid duplex.   I did discuss vascular risk factors as they pertain to the progression of vascular disease including controlling her hypertension, diabetes mellitus, and hyperlipidemia, which are stable.  This was all discussed in full with complete understanding.     Thank you for allowing me to participate in the care of your patient.  Please do not hesitate with any questions or concerns.  I will keep you aware of any further encounters with Radha Rodriguez.        Sincerely yours,         GUNNAR Damon, GUNNAR Gale

## 2020-07-27 NOTE — PROGRESS NOTES
11. 8/MCV90. Normal WBC/Plts. 1/22/2019-CBC with hemoglobin 11.6.   7/23/2019- CBC with a hemoglobin of 11.9 and MCV 92.1, continues to take Niferex daily  01/21/2020- CBC Hb 11.4/91. WBC 5.2, Platelets 068A.         PAST MEDICAL HISTORY:   Past Medical History:   Diagnosis Date    Carotid stenosis     Colonic polyp     DM (diabetes mellitus) ([facility] [address]     Essential hypertension     Gastric ulcer     duodenal    GERD (gastroesophageal reflux disease)     Hiatal hernia     Hyperlipidemia     Iron deficiency anemia secondary to blood loss (chronic)     OA (osteoarthritis)     Status post dilatation of esophageal stricture           PAST SURGICAL HISTORY:  Past Surgical History:   Procedure Laterality Date    ANKLE SURGERY Left     ORIF    COLONOSCOPY  01/2018    Dr Roberto Redmond Right     UPPER GASTROINTESTINAL ENDOSCOPY  06/11/2018    Dr Obie Mckeon HISTORY:  Social History     Socioeconomic History    Marital status:      Spouse name: None    Number of children: None    Years of education: None    Highest education level: None   Occupational History    None   Social Needs    Financial resource strain: None    Food insecurity     Worry: None     Inability: None    Transportation needs     Medical: None     Non-medical: None   Tobacco Use    Smoking status: Never Smoker    Smokeless tobacco: Never Used   Substance and Sexual Activity    Alcohol use: Never     Frequency: Never    Drug use: None    Sexual activity: None   Lifestyle    Physical activity     Days per week: None     Minutes per session: None    Stress: None   Relationships    Social connections     Talks on phone: None     Gets together: None     Attends Baptism service: None     Active member of club or organization: None     Attends meetings of clubs or organizations: None     Relationship status: None    Intimate partner violence     Fear of current or ex partner: None     Emotionally abused: None     Physically abused: None     Forced sexual activity: None   Other Topics Concern    None   Social History Narrative    None       FAMILY HISTORY:  Family History   Problem Relation Age of Onset    Colon Cancer Mother     Liver Cancer Mother         mets    Dementia Father         Current Outpatient Medications   Medication Sig Dispense Refill    omeprazole (PRILOSEC) 20 MG delayed release capsule Take 20 mg by mouth daily      clorazepate (TRANXENE) 3.75 MG tablet Take 3.75 mg by mouth nightly.  atorvastatin (LIPITOR) 10 MG tablet Take 10 mg by mouth every evening      cyclobenzaprine (FLEXERIL) 5 MG tablet Take 5 mg by mouth 2 times daily      iron polysaccharides (NIFEREX) 150 MG capsule Take 150 mg by mouth daily      indapamide (LOZOL) 1.25 MG tablet Take 1.25 mg by mouth Daily      JANUVIA 50 MG tablet Take 50 mg by mouth Daily with supper      losartan (COZAAR) 100 MG tablet Take 100 mg by mouth daily      metFORMIN (GLUCOPHAGE) 500 MG tablet Take 500 mg by mouth every morning (before breakfast)      rOPINIRole (REQUIP) 1 MG tablet Take 1 mg by mouth nightly as needed      tolterodine (DETROL LA) 4 MG extended release capsule Take 4 mg by mouth nightly      levocetirizine (XYZAL) 5 MG tablet Take 5 mg by mouth daily as needed      aspirin 81 MG tablet Take 81 mg by mouth daily      Coenzyme Q10 10 MG CAPS Take 200 mg by mouth daily      Cholecalciferol (VITAMIN D3) 50 MCG (2000 UT) CAPS Take 2,000 Units by mouth Daily       No current facility-administered medications for this visit.          REVIEW OF SYSTEMS:    Constitutional: no fever, no night sweats, no fatigue;   HEENT: no blurring of vision, no double vision, no hearing difficulty, no tinnitus,no ulceration, no dysphagia  Lungs: no cough, no shortness of breath, no wheeze;   CVS: no palpitation, no chest pain, no shortness of breath;  GI: no abdominal pain, no nausea , no vomiting, no constipation;   ELROY: no dysuria, frequency and urgency, no hematuria, no kidney stones;   Musculoskeletal: no joint pain, swelling , stiffness;   Endocrine: no polyuria, polydypsia, no cold or heat intolerence; Hematology/lymphatic: no easy brusing or bleeding, no hx of clotting disorder; no peripheral adenopathy. Dermatology: no skin rash, no eczema, no pruritis;   Psychiatry: no depression, no anxiety,no panic attacks, no suicide ideation; Neurology: no syncope, no seizures, no numbness or tingling of hands, no numbness or tingling of feet, no paresis;     PHYSICAL EXAM:    Vitals signs:  BP (!) 140/78   Pulse 74   Temp 98.2 °F (36.8 °C)   Ht 5' 6\" (1.676 m)   Wt 205 lb (93 kg)   BMI 33.09 kg/m²    Pain scale:  Pain Score:   0 - No pain     CONSTITUTIONAL: Alert, appropriate, no acute distress,   EYES: Non icteric, EOM intact, pupils equal round and reactive to light and accommodation. ENT: Oral mucus membranes moist, no oral pharyngeal lesions. External inspection of ears and nose are normal.   NECK: Supple, no masses. No palpable thyroid mass    CHEST/LUNGS: CTA bilaterally, normal respiratory effort   CARDIOVASCULAR: RRR, no murmurs. No lower extremity edema   ABDOMEN: soft non-tender, active bowel sounds, no hepatosplenomegaly. No palpable masses. EXTREMITIES: warm, Full ROM of all fours extremities. No focal weakness. SKIN: warm, dry with no rashes or lesions  LYMPH: No cervical, clavicular, axillary, or inguinal lymphadenopathy  NEUROLOGIC: follows commands, non focal.   PSYCH: mood and affect appropriate. Alert and oriented to time and place and person. Relevant Lab findings/reviewed by me:  CBC: 8/4/2020  WBC-6.0  HGB-11.5  PLT-259,000  Neut-4.0    Iron profile pending  B12 and folate- pending      Relevant Imaging studies/reviewed by me:  No results found.       ASSESSMENT    Orders Placed This Encounter   Procedures    Iron and TIBC     Standing Status:   Future     Standing Expiration Date:   8/4/2021 Order Specific Question:   Is Patient Fasting? Answer:   no     Order Specific Question:   No of Hours? Answer:   na    Ferritin     Standing Status:   Future     Standing Expiration Date:   8/4/2021    Vitamin B12 & Folate     Standing Status:   Future     Standing Expiration Date:   8/4/2021      Samina Alejandre was seen today for anemia. Diagnoses and all orders for this visit:    Iron deficiency anemia secondary to blood loss (chronic)  -     Iron and TIBC; Future  -     Ferritin; Future  -     Vitamin B12 & Folate; Future    Healthcare maintenance           PLAN:  Diagnosis   Iron deficiency secondary to upper GI ulcers   Normal colonoscopy January 2018. Treatment summary  PO Niferex every day   September 2018-Injectafer 1500mg  History of iron deficiency anemia secondary to upper GI ulcers:   Samina Alejandre continues to take PO iron replacement, Niferex, once daily and tolerating without significant difficulty. Samina Alejandre returns today in routine 6 month follow-up appointment. She has no new complaints and indicates that overall she is doing well. She also continues to take ranitidine 300 mg daily. CBC today reveals a hemoglobin of 11.4 and MCV of 91. Physical exam and review of systems are without acute findings. Iron substrates will be reevaluated today. Samina Alejandre will continue taking the Niferex at this time. Once results of iron substrates from today are reviewed, consideration to discontinue Niferex will be given. Summary of Plan:  1. CMP, iron, ferritin, vitamin B12 and folate today at [facility]  2. PRN for follow up    ? Dictation utilize Dragon transcription software      Follow Up:     Return if symptoms worsen or fail to improve.    Data Panda Figueroa am scribing for Etta Justin MD. Electronically signed by She Figueroa on 8/4/2020 at 7:59 AM.     I, Dr Melody Jo, personally performed the services described in this documentation as scribed by She Figueroa MA in my presence and is both accurate and complete. Over 50% of the total visit time of 15 minutes in face to face encounter with the patient, out of which more than 50% of the time was spent in counseling patient or family and coordination of care. Counseling included but was not limited to time spent reviewing labs, imaging studies/ treatment plan and answering questions.

## 2020-08-04 ENCOUNTER — OFFICE VISIT (OUTPATIENT)
Dept: HEMATOLOGY | Age: 70
End: 2020-08-04
Payer: MEDICARE

## 2020-08-04 VITALS
DIASTOLIC BLOOD PRESSURE: 78 MMHG | TEMPERATURE: 98.2 F | HEIGHT: 66 IN | WEIGHT: 205 LBS | SYSTOLIC BLOOD PRESSURE: 140 MMHG | HEART RATE: 74 BPM | BODY MASS INDEX: 32.95 KG/M2

## 2020-08-04 PROCEDURE — 1123F ACP DISCUSS/DSCN MKR DOCD: CPT | Performed by: INTERNAL MEDICINE

## 2020-08-04 PROCEDURE — 4040F PNEUMOC VAC/ADMIN/RCVD: CPT | Performed by: INTERNAL MEDICINE

## 2020-08-04 PROCEDURE — 3017F COLORECTAL CA SCREEN DOC REV: CPT | Performed by: INTERNAL MEDICINE

## 2020-08-04 PROCEDURE — 1036F TOBACCO NON-USER: CPT | Performed by: INTERNAL MEDICINE

## 2020-08-04 PROCEDURE — G8417 CALC BMI ABV UP PARAM F/U: HCPCS | Performed by: INTERNAL MEDICINE

## 2020-08-04 PROCEDURE — 1090F PRES/ABSN URINE INCON ASSESS: CPT | Performed by: INTERNAL MEDICINE

## 2020-08-04 PROCEDURE — G8427 DOCREV CUR MEDS BY ELIG CLIN: HCPCS | Performed by: INTERNAL MEDICINE

## 2020-08-04 PROCEDURE — 99213 OFFICE O/P EST LOW 20 MIN: CPT | Performed by: INTERNAL MEDICINE

## 2020-08-04 PROCEDURE — G8400 PT W/DXA NO RESULTS DOC: HCPCS | Performed by: INTERNAL MEDICINE

## 2020-08-04 RX ORDER — OMEPRAZOLE 20 MG/1
20 CAPSULE, DELAYED RELEASE ORAL DAILY
COMMUNITY

## 2020-08-04 RX ORDER — CLORAZEPATE DIPOTASSIUM 3.75 MG/1
3.75 TABLET ORAL NIGHTLY
COMMUNITY
End: 2022-09-20

## 2021-01-05 ENCOUNTER — TELEPHONE (OUTPATIENT)
Dept: VASCULAR SURGERY | Facility: CLINIC | Age: 71
End: 2021-01-05

## 2021-01-05 NOTE — TELEPHONE ENCOUNTER
Spoke with Mrs Rodriguez reminding her of her appointments for Wednesday, January 6th, 2021. [facility] for 10 o'clock testing and follow up afterwards at 1115 am with Debbie MAHER. Mrs Rodriguez confirmed she would be here.

## 2021-01-06 ENCOUNTER — HOSPITAL ENCOUNTER (OUTPATIENT)
Dept: ULTRASOUND IMAGING | Facility: HOSPITAL | Age: 71
Discharge: HOME | End: 2021-01-06
Admitting: NURSE PRACTITIONER

## 2021-01-06 ENCOUNTER — OFFICE VISIT (OUTPATIENT)
Dept: VASCULAR SURGERY | Facility: CLINIC | Age: 71
End: 2021-01-06

## 2021-01-06 VITALS
DIASTOLIC BLOOD PRESSURE: 68 MMHG | HEIGHT: 66 IN | BODY MASS INDEX: 29.73 KG/M2 | OXYGEN SATURATION: 98 % | WEIGHT: 185 LBS | HEART RATE: 99 BPM | SYSTOLIC BLOOD PRESSURE: 124 MMHG

## 2021-01-06 DIAGNOSIS — I65.23 BILATERAL CAROTID ARTERY STENOSIS: ICD-10-CM

## 2021-01-06 DIAGNOSIS — I65.23 BILATERAL CAROTID ARTERY STENOSIS: Primary | ICD-10-CM

## 2021-01-06 DIAGNOSIS — E78.5 HYPERLIPIDEMIA, UNSPECIFIED HYPERLIPIDEMIA TYPE: ICD-10-CM

## 2021-01-06 DIAGNOSIS — I10 ESSENTIAL HYPERTENSION: ICD-10-CM

## 2021-01-06 PROCEDURE — 93880 EXTRACRANIAL BILAT STUDY: CPT | Performed by: SURGERY

## 2021-01-06 PROCEDURE — 99214 OFFICE O/P EST MOD 30 MIN: CPT | Performed by: NURSE PRACTITIONER

## 2021-01-06 PROCEDURE — 93880 EXTRACRANIAL BILAT STUDY: CPT

## 2021-01-06 RX ORDER — HYDROCODONE BITARTRATE AND ACETAMINOPHEN 5; 325 MG/1; MG/1
1 TABLET ORAL AS NEEDED
COMMUNITY

## 2021-01-06 RX ORDER — MULTIVIT-MIN/IRON/FOLIC ACID/K 18-600-40
500 CAPSULE ORAL DAILY
COMMUNITY
End: 2022-11-28

## 2021-01-06 RX ORDER — CLORAZEPATE DIPOTASSIUM 3.75 MG/1
3.75 TABLET ORAL 2 TIMES DAILY
COMMUNITY
End: 2022-11-28

## 2021-01-06 RX ORDER — FLUTICASONE PROPIONATE 50 MCG
2 SPRAY, SUSPENSION (ML) NASAL DAILY
COMMUNITY
End: 2022-11-28

## 2021-01-06 NOTE — PROGRESS NOTES
"01/06/2021       Joceline Murguia APRN   [address] [facility] [address]    Radha Rodriguez  1950    Chief Complaint   Patient presents with   • Follow-up     1 Year Follow Up For Bilateral Carotid Artery Stenosis. Test [id number] US pad carotid bilateral. Patient denies any stroke like symptoms.    • Non Smoker     Patient is a Non smoker   • Med Management     Verified medications from list patient brought in.        Dear Joceline Murguia APRN       HPI  I had the pleasure of seeing your patient Radha Rodriguez in the office today.   As you recall, Radha Rodriguez is a 71 y.o.  female who we are following for asymptomatic carotid disease.  She is doing well denies any claudication symptoms to her lower extremities.  She also denies any strokelike symptoms.   She is maintained on aspirin and Lipitor.  She did have noninvasive testing performed today, which I did review in office.      Review of Systems   Constitutional: Negative.    HENT: Negative.    Eyes: Negative.    Respiratory: Negative.    Cardiovascular: Negative.    Gastrointestinal: Negative.    Endocrine: Negative.    Genitourinary: Negative.    Musculoskeletal: Negative.    Skin: Negative.    Allergic/Immunologic: Negative.    Neurological: Negative.    Hematological: Negative.    Psychiatric/Behavioral: Negative.         /68 (BP Location: Right arm, Patient Position: Sitting, Cuff Size: Adult)   Pulse 99   Ht 167.6 cm (66\")   Wt 83.9 kg (185 lb)   SpO2 98%   BMI 29.86 kg/m²      Physical Exam  Vitals signs and nursing note reviewed.   Constitutional:       General: She is not in acute distress.     Appearance: She is well-developed. She is not diaphoretic.   HENT:      Head: Normocephalic and atraumatic.   Eyes:      General: No scleral icterus.     Pupils: Pupils are equal, round, and reactive to light.   Neck:      Musculoskeletal: Normal range of motion and neck supple.      Thyroid: No thyromegaly.      Vascular: No carotid bruit or JVD. "   Cardiovascular:      Rate and Rhythm: Normal rate and regular rhythm.      Pulses: Normal pulses.      Heart sounds: Normal heart sounds and S2 normal. No murmur. No friction rub. No gallop.    Pulmonary:      Effort: Pulmonary effort is normal.      Breath sounds: Normal breath sounds.   Abdominal:      General: Bowel sounds are normal.      Palpations: Abdomen is soft.   Musculoskeletal: Normal range of motion.   Skin:     General: Skin is warm and dry.   Neurological:      Mental Status: She is alert and oriented to person, place, and time.      Cranial Nerves: No cranial nerve deficit.   Psychiatric:         Behavior: Behavior normal.         Thought Content: Thought content normal.         Judgment: Judgment normal.         Diagnostic Data:  Us Carotid Bilateral    Result Date: 1/6/2021  Narrative: History: Carotid occlusive disease      Impression: Impression: 1. There is less than 50% stenosis of the right internal carotid artery. 2. There is less than 50% stenosis of the left internal carotid artery. 3. Antegrade flow is demonstrated in bilateral vertebral arteries.  Comments: Bilateral carotid vertebral arterial duplex scan was performed.  Grayscale imaging shows intimal thickening and calcified elements at the carotid bifurcation. The right internal carotid artery peak systolic velocity is 96.8 cm/sec. The end-diastolic velocity is 29.9 cm/sec. The right ICA/CCA ratio is approximately 0.6 . These findings correlate with less than 50% stenosis of the right internal carotid artery.  Grayscale imaging shows intimal thickening and calcified elements at the carotid bifurcation. The left internal carotid artery peak systolic velocity is 107.5 cm/sec. The end-diastolic velocity is 32.7 cm/sec. The left ICA/CCA ratio is approximately 0.7 . These findings correlate with less than 50% stenosis of the left internal carotid artery.  Antegrade flow is demonstrated in bilateral vertebral arteries. There is greater  than 50% stenosis in bilateral common carotid arteries. This report was finalized on 01/06/2021 17:21 by Dr. Mario Saldana MD.         Patient Active Problem List   Diagnosis   • Hypertension   • Hyperlipidemia   • Diabetes mellitus (CMS/[facility])   • Carotid bruit         ICD-10-CM ICD-9-CM   1. Bilateral carotid artery stenosis  I65.23 433.10     433.30   2. Essential hypertension  I10 401.9   3. Hyperlipidemia, unspecified hyperlipidemia type  E78.5 272.4       Plan: After thoroughly evaluating Radha Rodriguez, I believe the best course of action is to remain conservative from vascular surgery standpoint.  Currently she is doing well denies any strokelike symptoms.  I did review her testing which remains less than 50% carotid stenosis bilaterally.  We will see her back in 1 year with repeat noninvasive testing for continued surveillance, including a carotid duplex.   I did discuss vascular risk factors as they pertain to the progression of vascular disease including controlling her hypertension, diabetes mellitus, and hyperlipidemia.  Her blood pressure is stable on her current medication regimen.  She is maintained on Lipitor for her hyperlipidemia.  Patient's Body mass index is 29.86 kg/m². BMI is above normal parameters. Recommendations include: educational material. This was all discussed in full with complete understanding.     Thank you for allowing me to participate in the care of your patient.  Please do not hesitate with any questions or concerns.  I will keep you aware of any further encounters with Radha Rodriguez.        Sincerely yours,         GUNNAR Damon Alicia H, APRN

## 2021-03-04 ENCOUNTER — OFFICE VISIT (OUTPATIENT)
Dept: GASTROENTEROLOGY | Facility: CLINIC | Age: 71
End: 2021-03-04

## 2021-03-04 VITALS
BODY MASS INDEX: 30.53 KG/M2 | HEIGHT: 66 IN | SYSTOLIC BLOOD PRESSURE: 126 MMHG | WEIGHT: 190 LBS | TEMPERATURE: 97 F | OXYGEN SATURATION: 99 % | HEART RATE: 91 BPM | DIASTOLIC BLOOD PRESSURE: 66 MMHG

## 2021-03-04 DIAGNOSIS — Z86.010 HISTORY OF COLON POLYPS: Primary | ICD-10-CM

## 2021-03-04 PROBLEM — Z86.0100 HISTORY OF COLON POLYPS: Status: ACTIVE | Noted: 2021-03-04

## 2021-03-04 PROCEDURE — S0260 H&P FOR SURGERY: HCPCS | Performed by: NURSE PRACTITIONER

## 2021-03-04 NOTE — PROGRESS NOTES
Chief Complaint   Patient presents with   • Colonoscopy     had colon by [place] 2017 no polyps last 2       PCP: Joceline Murguia APRN  REFER: Joceline Murguia APRN    Subjective     HPI    Radha Rodriguez is a 71 y.o. female who presents to office for preventative maintenance.  There is  a personal history of colon polyps.  There is not a history of colon cancer.  She does not have complaints of nausea/vomiting, change in bowels, weight loss, no BRBPR, no melena.  There is not a family history of colon cancer.  There is not a family history of colon polyps.  Her last colonoscopy-2017 .  Bowels do move on regular basis.      Past Medical History:   Diagnosis Date   • Anemia    • Carotid bruit    • Diabetes mellitus (CMS/HCC)    • GERD (gastroesophageal reflux disease)    • Hyperlipidemia    • Hypertension      Past Surgical History:   Procedure Laterality Date   • ANKLE SURGERY Left     fracture repair   • COLONOSCOPY     • TOTAL KNEE ARTHROPLASTY Right      Outpatient Medications Marked as Taking for the 3/4/21 encounter (Office Visit) with Ernst Moore APRN   Medication Sig Dispense Refill   • acetaminophen (TYLENOL 8 HOUR ARTHRITIS PAIN) 650 MG 8 hr tablet Take 650 mg by mouth Every 8 (Eight) Hours As Needed for Mild Pain .     • aspirin 81 MG EC tablet      • atorvastatin (LIPITOR) 10 MG tablet Take 10 mg by mouth Daily.     • Cholecalciferol (VITAMIN D) 2000 UNITS capsule Take  by mouth.     • clorazepate (TRANXENE) 3.75 MG tablet Take 3.75 mg by mouth 2 (Two) Times a Day.     • Coenzyme Q10 (Co Q 10) 100 MG capsule Take 200 mg by mouth Daily.     • cyclobenzaprine (FLEXERIL) 5 MG tablet Take 5 mg by mouth 2 (Two) Times a Day As Needed.     • fluticasone (FLONASE) 50 MCG/ACT nasal spray 2 sprays into the nostril(s) as directed by provider Daily.     • HYDROcodone-acetaminophen (NORCO) 5-325 MG per tablet Take 1 tablet by mouth As Needed.     • indapamide (LOZOL) 1.25 MG tablet Take 1.25 mg by mouth Daily.      • iron polysaccharides (NIFEREX) 150 MG capsule Take 150 mg by mouth Daily.     • JANUVIA 50 MG tablet Take 50 mg by mouth Daily.     • levocetirizine (XYZAL) 5 MG tablet Take 5 mg by mouth As Needed for Allergies.     • losartan (COZAAR) 100 MG tablet Take 100 mg by mouth Daily.     • metFORMIN (GLUCOPHAGE) 500 MG tablet Take 500 mg by mouth 2 (Two) Times a Day With Meals.     • omeprazole (priLOSEC) 20 MG capsule Take 20 mg by mouth Daily.     • Psyllium (METAMUCIL FIBER PO) Take  by mouth Daily.     • rOPINIRole (REQUIP) 1 MG tablet Take 1 mg by mouth Every Night.     • tolterodine LA (DETROL LA) 4 MG 24 hr capsule Take 4 mg by mouth Every Night.     • vitamin C (ASCORBIC ACID) 250 MG tablet Take 250 mg by mouth Daily.     • Zinc Sulfate (ZINC 15 PO) Take  by mouth.       No Known Allergies  Social History     Socioeconomic History   • Marital status:      Spouse name: Not on file   • Number of children: Not on file   • Years of education: Not on file   • Highest education level: Not on file   Tobacco Use   • Smoking status: Never Smoker   • Smokeless tobacco: Never Used   Substance and Sexual Activity   • Alcohol use: No   • Drug use: No   • Sexual activity: Defer     Review of Systems   Constitutional: Negative for unexpected weight change.   Respiratory: Negative for shortness of breath.    Cardiovascular: Negative for chest pain.   Gastrointestinal: Negative for abdominal pain and anal bleeding.     Objective   Vitals:    03/04/21 1513   BP: 126/66   Pulse: 91   Temp: 97 °F (36.1 °C)   SpO2: 99%     Physical Exam  Constitutional:       Appearance: Normal appearance. She is well-developed.   Eyes:      General: No scleral icterus.  Cardiovascular:      Rate and Rhythm: Regular rhythm.      Heart sounds: Normal heart sounds. No murmur.   Pulmonary:      Effort: Pulmonary effort is normal. No accessory muscle usage.      Breath sounds: Normal breath sounds.   Abdominal:      General: Bowel sounds are  normal. There is no distension.      Palpations: Abdomen is soft. There is no mass.      Tenderness: There is no abdominal tenderness. There is no guarding or rebound.   Skin:     General: Skin is warm and dry.      Coloration: Skin is not jaundiced.   Neurological:      Mental Status: She is alert.   Psychiatric:         Behavior: Behavior is cooperative.       Imaging Results (Most Recent)     None        Body mass index is 30.67 kg/m².    Assessment/Plan   Diagnoses and all orders for this visit:    1. History of colon polyps (Primary)  -     Case Request; Standing  -     Case Request    Other orders  -     Implement Anesthesia Orders Day of Procedure; Standing  -     Obtain Informed Consent; Standing      COLONOSCOPY WITH ANESTHESIA (N/A)    Advised to hold Aspirin x 5 days prior to procedure     Patient is to continue all blood pressure and cardiac medications prior to procedure and has been advised to take medications morning of procedure   Pt verbalized understanding        Advised pt to stop use of NSAIDs, Fish Oil, and MV 5 days prior to procedure, per Dr Elmore protocol.  Tylenol based products are ok to take.  Pt verbalized understanding.     All risks, benefits, alternatives, and indications of colonoscopy procedure have been discussed with the patient. Risks to include perforation of the colon requiring possible surgery or colostomy, risk of bleeding from biopsies or removal of colon tissue, possibility of missing a colon polyp or cancer, or adverse drug reaction.  Benefits to include the diagnosis and management of disease of the colon and rectum. Alternatives to include barium enema, radiographic evaluation, lab testing or no intervention. She verbalizes understanding and agrees.     Precautions are currently being put in place due to COVID-19.  I have explained to Radha Rodriguez they will be required to undergo COVID testing prior to their procedure.  Radha Rodriguez verbalized understanding and was  willing to proceed.     Patient's Body mass index is 30.67 kg/m². BMI is above normal parameters. Recommendations include: no follow up.          GUNNAR Rogers  03/05/21        There are no Patient Instructions on file for this visit.

## 2021-03-17 ENCOUNTER — TRANSCRIBE ORDERS (OUTPATIENT)
Dept: ADMINISTRATIVE | Facility: HOSPITAL | Age: 71
End: 2021-03-17

## 2021-03-17 DIAGNOSIS — Z11.59 SCREENING FOR VIRAL DISEASE: Primary | ICD-10-CM

## 2021-03-20 ENCOUNTER — LAB (OUTPATIENT)
Dept: LAB | Facility: HOSPITAL | Age: 71
End: 2021-03-20

## 2021-03-20 LAB — SARS-COV-2 ORF1AB RESP QL NAA+PROBE: NOT DETECTED

## 2021-03-20 PROCEDURE — U0004 COV-19 TEST NON-CDC HGH THRU: HCPCS | Performed by: INTERNAL MEDICINE

## 2021-03-20 PROCEDURE — U0005 INFEC AGEN DETEC AMPLI PROBE: HCPCS | Performed by: INTERNAL MEDICINE

## 2021-03-20 PROCEDURE — C9803 HOPD COVID-19 SPEC COLLECT: HCPCS | Performed by: INTERNAL MEDICINE

## 2021-03-23 ENCOUNTER — ANESTHESIA (OUTPATIENT)
Dept: GASTROENTEROLOGY | Facility: HOSPITAL | Age: 71
End: 2021-03-23

## 2021-03-23 ENCOUNTER — ANESTHESIA EVENT (OUTPATIENT)
Dept: GASTROENTEROLOGY | Facility: HOSPITAL | Age: 71
End: 2021-03-23

## 2021-03-23 ENCOUNTER — HOSPITAL ENCOUNTER (OUTPATIENT)
Facility: HOSPITAL | Age: 71
Setting detail: HOSPITAL OUTPATIENT SURGERY
Discharge: HOME | End: 2021-03-23
Attending: INTERNAL MEDICINE | Admitting: INTERNAL MEDICINE

## 2021-03-23 VITALS
RESPIRATION RATE: 22 BRPM | DIASTOLIC BLOOD PRESSURE: 75 MMHG | SYSTOLIC BLOOD PRESSURE: 138 MMHG | HEART RATE: 96 BPM | OXYGEN SATURATION: 100 % | BODY MASS INDEX: 29.89 KG/M2 | TEMPERATURE: 97.8 F | WEIGHT: 186 LBS | HEIGHT: 66 IN

## 2021-03-23 LAB — GLUCOSE BLDC GLUCOMTR-MCNC: 84 MG/DL (ref 70–130)

## 2021-03-23 PROCEDURE — G0105 COLORECTAL SCRN; HI RISK IND: HCPCS | Performed by: INTERNAL MEDICINE

## 2021-03-23 PROCEDURE — 82962 GLUCOSE BLOOD TEST: CPT

## 2021-03-23 PROCEDURE — 25010000002 PROPOFOL 10 MG/ML EMULSION: Performed by: NURSE ANESTHETIST, CERTIFIED REGISTERED

## 2021-03-23 RX ORDER — LIDOCAINE HYDROCHLORIDE 10 MG/ML
0.5 INJECTION, SOLUTION EPIDURAL; INFILTRATION; INTRACAUDAL; PERINEURAL ONCE AS NEEDED
Status: DISCONTINUED | OUTPATIENT
Start: 2021-03-23 | End: 2021-03-23 | Stop reason: HOSPADM

## 2021-03-23 RX ORDER — SODIUM CHLORIDE 9 MG/ML
500 INJECTION, SOLUTION INTRAVENOUS CONTINUOUS PRN
Status: DISCONTINUED | OUTPATIENT
Start: 2021-03-23 | End: 2021-03-23 | Stop reason: HOSPADM

## 2021-03-23 RX ORDER — PROPOFOL 10 MG/ML
VIAL (ML) INTRAVENOUS AS NEEDED
Status: DISCONTINUED | OUTPATIENT
Start: 2021-03-23 | End: 2021-03-23 | Stop reason: SURG

## 2021-03-23 RX ORDER — SODIUM CHLORIDE 0.9 % (FLUSH) 0.9 %
10 SYRINGE (ML) INJECTION AS NEEDED
Status: DISCONTINUED | OUTPATIENT
Start: 2021-03-23 | End: 2021-03-23 | Stop reason: HOSPADM

## 2021-03-23 RX ORDER — LIDOCAINE HYDROCHLORIDE 20 MG/ML
INJECTION, SOLUTION EPIDURAL; INFILTRATION; INTRACAUDAL; PERINEURAL AS NEEDED
Status: DISCONTINUED | OUTPATIENT
Start: 2021-03-23 | End: 2021-03-23 | Stop reason: SURG

## 2021-03-23 RX ADMIN — PROPOFOL 200 MG: 10 INJECTION, EMULSION INTRAVENOUS at 11:38

## 2021-03-23 RX ADMIN — SODIUM CHLORIDE 500 ML: 9 INJECTION, SOLUTION INTRAVENOUS at 11:17

## 2021-03-23 RX ADMIN — LIDOCAINE HYDROCHLORIDE 60 MG: 20 INJECTION, SOLUTION EPIDURAL; INFILTRATION; INTRACAUDAL; PERINEURAL at 11:38

## 2021-03-23 NOTE — ANESTHESIA POSTPROCEDURE EVALUATION
Patient: Radha Rodriguez    Procedure Summary     Date: 03/23/21 Room / Location: [facility] ENDOSCOPY 5 /  [facility]    Anesthesia Start: 1133 Anesthesia Stop: 1153    Procedure: COLONOSCOPY WITH ANESTHESIA (N/A ) Diagnosis:       History of colon polyps      (History of colon polyps [Z86.010])    Surgeons: Francis Elmore DO Provider: Monty Rodriguez CRNA    Anesthesia Type: MAC ASA Status: 2          Anesthesia Type: MAC    Vitals  Vitals Value Taken Time   /60 03/23/21 1200   Temp     Pulse 91 03/23/21 1202   Resp 14 03/23/21 1200   SpO2 100 % 03/23/21 1202   Vitals shown include unvalidated device data.        Post Anesthesia Care and Evaluation    Patient location during evaluation: PHASE II  Patient participation: complete - patient participated  Level of consciousness: awake  Pain management: satisfactory to patient  Airway patency: patent  Anesthetic complications: No anesthetic complications  PONV Status: none  Cardiovascular status: acceptable  Respiratory status: acceptable  Hydration status: acceptable  No anesthesia care post op

## 2021-03-23 NOTE — ANESTHESIA PREPROCEDURE EVALUATION
Anesthesia Evaluation     no history of anesthetic complications:  NPO Solid Status: > 8 hours  NPO Liquid Status: > 4 hours           Airway   Mallampati: I  TM distance: >3 FB  Neck ROM: full  No difficulty expected  Dental          Pulmonary    (-) sleep apnea, not a smoker  Cardiovascular   Exercise tolerance: good (4-7 METS)    (+) hypertension, hyperlipidemia,   (-) past MI      Neuro/Psych  (-) seizures, TIA, CVA  GI/Hepatic/Renal/Endo    (+) obesity,  GERD,  diabetes mellitus type 2,   (-) liver disease, no renal disease    Musculoskeletal     Abdominal    Substance History      OB/GYN          Other                        Anesthesia Plan    ASA 2     MAC     intravenous induction     Anesthetic plan, all risks, benefits, and alternatives have been provided, discussed and informed consent has been obtained with: patient.

## 2021-03-24 ENCOUNTER — TELEPHONE (OUTPATIENT)
Dept: GASTROENTEROLOGY | Facility: CLINIC | Age: 71
End: 2021-03-24

## 2021-04-25 NOTE — PROGRESS NOTES
Vera Small   1950 4/27/2021     Chief Complaint   Patient presents with    Anemia        INTERVAL HISTORY/HISTORY OF PRESENT ILLNESS:  Diagnosis   Iron deficiency secondary to upper GI ulcers   Normal colonoscopy January 2018. Treatment summary  PO Niferex every other day   Sep 2018-Injectafer 1500mg  Interval history  Mrs. Yosef Rojas is a 80year-old  female with a history of iron deficiency anemia secondary to GI blood loss from gastric ulcers. . She continues to take PO iron replacement, Niferex, once daily and tolerating without significant difficulty. She was recently seen by her primary care provider. Her hemoglobin was 10.6. She was taking Niferex every other day and was instructed to take it daily. She has no new complaints today. Denies any  melena, hematochezia, change in the color of her stools. She denies any epigastric pain. She denies any use of NSAIDs. She denies any hematuria. CBC today showed improvement of hemoglobin to 11.0. HEMATOLOGY HISTORY: Iron deficiency anemia 2/2 upper GI ulcers  Asia Pastrana was seen in initial hematology consultation on 4/44/7467 referred by Mike GONZALEZ for persistent iron deficiency anemia. Asia Pastrana reports that she was started on Niferex 150 mg daily on 11/13/2017. A CBC that is available from 11/28/2017 revealed a WBC of 6, Hgb 10.8, MCV 86, PLT of 254,000. Serum iron was 57. Most recent follow-up endoscopy was performed on 6/11/2018 which revealed 2 superficial gastric ulcers within the antrum of the stomach and healing of the former duodenal ulcers. Mild to moderate gastritis of the antrum of the stomach was noted. She did have a distal esophageal stricture that was dilated. 1/29/2018-normal colonoscopy. Upper EGD showed 7 gastric ulcers and 4 duodenal ulcers. CBC 2/13/2018 revealed a WBC of 6.7, Hgb 10.4 with MCV 85.3 and PLT of 222,000.   07/20/2018-WBC 8.2, hemoglobin 11.5, PLT of 213,000. Ferritin 52, iron saturation 14%, TIBC 258. 9/5/2018-recommended 2 doses of IV Injectafer 750 mg. September 2018- s/p 1500mg Injectafer IV.   10/23/2018- CBC Hb 11.8/MCV90. Normal WBC/Plts. 1/22/2019-CBC with hemoglobin 11.6.   7/23/2019- CBC with a hemoglobin of 11.9 and MCV 92.1, continues to take Niferex daily  01/21/2020- CBC Hb 11.4/91. WBC 5.2, Platelets 369Y.   Iron 46, TIBC 234, iron saturation 20%  4/6/2021-B12 461  4/27/2021-hemoglobin 11, WBC 5.9, platelets 860,458  5/02/2489-JAOZOZYW Niferex 100 mg p.o. daily        PAST MEDICAL HISTORY:   Past Medical History:   Diagnosis Date    Carotid stenosis     Colonic polyp     DM (diabetes mellitus) ([facility] [address]     Essential hypertension     Gastric ulcer     duodenal    GERD (gastroesophageal reflux disease)     Hiatal hernia     Hyperlipidemia     Iron deficiency anemia secondary to blood loss (chronic)     OA (osteoarthritis)     Status post dilatation of esophageal stricture           PAST SURGICAL HISTORY:  Past Surgical History:   Procedure Laterality Date    ANKLE SURGERY Left     ORIF    COLONOSCOPY  01/2018    Dr Elba Willett Right     UPPER GASTROINTESTINAL ENDOSCOPY  [facility]/11/2018    [facility]        SOCIAL HISTORY:  Social History     Socioeconomic History    Marital status:      Spouse name: None    Number of children: None    Years of education: None    Highest education level: None   Occupational History    None   Social Needs    Financial resource strain: None    Food insecurity     Worry: None     Inability: None    Transportation needs     Medical: None     Non-medical: None   Tobacco Use    Smoking status: Never Smoker    Smokeless tobacco: Never Used   Substance and Sexual Activity    Alcohol use: Never     Frequency: Never    Drug use: None    Sexual activity: None   Lifestyle    Physical activity     Days per week: None     Minutes per session: None    Stress: None   Relationships    Social connections     Talks on phone: None Gets together: None     Attends Gnosticist service: None     Active member of club or organization: None     Attends meetings of clubs or organizations: None     Relationship status: None    Intimate partner violence     Fear of current or ex partner: None     Emotionally abused: None     Physically abused: None     Forced sexual activity: None   Other Topics Concern    None   Social History Narrative    None       FAMILY HISTORY:  Family History   Problem Relation Age of Onset    Colon Cancer Mother     Liver Cancer Mother         mets    Dementia Father         Current Outpatient Medications   Medication Sig Dispense Refill    Ascorbic Acid (VITAMIN C) 250 MG tablet Take 500 mg by mouth daily      zinc gluconate 50 MG tablet Take 50 mg by mouth daily      Probiotic Product (PROBIOTIC DAILY PO) Take 1 tablet by mouth daily      HYDROcodone-acetaminophen (NORCO) 5-325 MG per tablet Take 1 tablet by mouth every 6 hours as needed for Pain.  fluticasone (FLONASE) 50 MCG/ACT nasal spray 2 sprays by Each Nostril route daily      omeprazole (PRILOSEC) 20 MG delayed release capsule Take 20 mg by mouth daily      clorazepate (TRANXENE) 3.75 MG tablet Take 3.75 mg by mouth nightly.       atorvastatin (LIPITOR) 10 MG tablet Take 10 mg by mouth every evening      cyclobenzaprine (FLEXERIL) 5 MG tablet Take 5 mg by mouth 2 times daily      iron polysaccharides (NIFEREX) 150 MG capsule Take 150 mg by mouth daily      indapamide (LOZOL) 1.25 MG tablet Take 1.25 mg by mouth Daily      JANUVIA 50 MG tablet Take 50 mg by mouth Daily with supper      losartan (COZAAR) 100 MG tablet Take 100 mg by mouth daily      metFORMIN (GLUCOPHAGE) 500 MG tablet Take 500 mg by mouth every morning (before breakfast)      rOPINIRole (REQUIP) 1 MG tablet Take 1 mg by mouth nightly as needed      tolterodine (DETROL LA) 4 MG extended release capsule Take 4 mg by mouth nightly      levocetirizine (XYZAL) 5 MG tablet Take 5 mg by mouth daily as needed      aspirin 81 MG tablet Take 81 mg by mouth daily      Coenzyme Q10 10 MG CAPS Take 200 mg by mouth daily      Cholecalciferol (VITAMIN D3) 50 MCG (2000 UT) CAPS Take 2,000 Units by mouth Daily       No current facility-administered medications for this visit. REVIEW OF SYSTEMS:    Constitutional: no fever, no night sweats, no fatigue;   HEENT: no blurring of vision, no double vision, no hearing difficulty, no tinnitus,no ulceration, no dysphagia  Lungs: no cough, no shortness of breath, no wheeze;   CVS: no palpitation, no chest pain, no shortness of breath;  GI: no abdominal pain, no nausea , no vomiting, no constipation;   ELROY: no dysuria, frequency and urgency, no hematuria, no kidney stones;   Musculoskeletal: no joint pain, swelling , stiffness;   Endocrine: no polyuria, polydypsia, no cold or heat intolerence; Hematology/lymphatic: no easy brusing or bleeding, no hx of clotting disorder; no peripheral adenopathy. Dermatology: no skin rash, no eczema, no pruritis;   Psychiatry: no depression, no anxiety,no panic attacks, no suicide ideation; Neurology: no syncope, no seizures, no numbness or tingling of hands, no numbness or tingling of feet, no paresis;     PHYSICAL EXAM:    Vitals signs:  BP (!) 146/68   Pulse 99   Temp 98.1 °F (36.7 °C)   Ht 5' 6\" (1.676 m)   Wt 188 lb (85.3 kg)   SpO2 97%   BMI 30.34 kg/m²    Pain scale:  Pain Score:   0 - No pain     CONSTITUTIONAL: Alert, appropriate, no acute distress,   EYES: Non icteric, EOM intact, pupils equal round and reactive to light and accommodation. ENT: Oral mucus membranes moist, no oral pharyngeal lesions. External inspection of ears and nose are normal.   NECK: Supple, no masses. No palpable thyroid mass    CHEST/LUNGS: CTA bilaterally, normal respiratory effort   CARDIOVASCULAR: RRR, no murmurs. No lower extremity edema   ABDOMEN: soft non-tender, active bowel sounds, no hepatosplenomegaly.  No palpable masses. EXTREMITIES: warm, Full ROM of all fours extremities. No focal weakness. SKIN: warm, dry with no rashes or lesions  LYMPH: No cervical, clavicular, axillary, or inguinal lymphadenopathy  NEUROLOGIC: follows commands, non focal.   PSYCH: mood and affect appropriate. Alert and oriented to time and place and person. Relevant Lab findings/reviewed by me:  1/20/21   Iron profile   Iron 46  TIBC 234  UIBC 188  Iron sat 20%  Vit D 47.5    4/6/21   B12 461  Folate 19.9    CBC: 4/27/21  WBC-5.9  HGB-112.0  PLT-292  Neut-3.9    Relevant Imaging studies/reviewed by me:  No results found. ASSESSMENT    No orders of the defined types were placed in this encounter. Vaishali Deleon was seen today for anemia. Diagnoses and all orders for this visit:    Iron deficiency anemia secondary to blood loss (chronic)    Healthcare maintenance    Care plan discussed with patient       PLAN:  Diagnosis   Iron deficiency secondary to upper GI ulcers   Normal colonoscopy January 2018.  3/23/21 Colonoscopy normal-return in 10 years  Treatment summary  PO Niferex every day   September 2018-Injectafer 1500mg  History of iron deficiency anemia secondary to upper GI ulcers:   CBC today reveals a hemoglobin of 11. This is mildly improved from 10.6. Physical exam and review of systems are without acute findings. Vaishali Deleon will continue taking the Niferex  daily. RTC in 6 months with iron profile before next visit. Summary of Plan:  1. CBC today  2. Continue Niferex daily  3. CMP CBC iron profile 1 week before next visit  4. RTC 6 months    ? Dictation utilize Dragon transcription software      Follow Up:     No follow-ups on file. Data Luis Galan am scribing for Wilfrido Segal MD. Electronically signed by Lexi Deal RN on 4/27/2021 at 8:21 PM CDT.     I, Dr Antony Christine, personally performed the services described in this documentation as scribed by Lexi Deal RN in my presence and is both accurate and complete. I have seen, examined and reviewed this patient medication list, appropriate labs and imaging studies. I reviewed relevant medical records and others physicians notes. I discussed the plans of care with the patient. I answered all the questions to the patients satisfaction. I have also reviewed the chief complaint (CC) and part of the history (History of Present Illness (HPI), Past Family Social History [facility]), or Review of Systems (ROS) and made changes when appropriated.        (Please note that portions of this note were completed with a voice recognition program. Efforts were made to edit the dictations but occasionally words are mis-transcribed.)

## 2021-04-27 ENCOUNTER — OFFICE VISIT (OUTPATIENT)
Dept: HEMATOLOGY | Age: 71
End: 2021-04-27
Payer: MEDICARE

## 2021-04-27 VITALS
HEART RATE: 99 BPM | DIASTOLIC BLOOD PRESSURE: 68 MMHG | WEIGHT: 188 LBS | SYSTOLIC BLOOD PRESSURE: 146 MMHG | BODY MASS INDEX: 30.22 KG/M2 | OXYGEN SATURATION: 97 % | TEMPERATURE: 98.1 F | HEIGHT: 66 IN

## 2021-04-27 DIAGNOSIS — D50.0 IRON DEFICIENCY ANEMIA SECONDARY TO BLOOD LOSS (CHRONIC): Primary | ICD-10-CM

## 2021-04-27 DIAGNOSIS — Z71.89 CARE PLAN DISCUSSED WITH PATIENT: ICD-10-CM

## 2021-04-27 DIAGNOSIS — Z00.00 HEALTHCARE MAINTENANCE: ICD-10-CM

## 2021-04-27 PROCEDURE — 3017F COLORECTAL CA SCREEN DOC REV: CPT | Performed by: INTERNAL MEDICINE

## 2021-04-27 PROCEDURE — G8427 DOCREV CUR MEDS BY ELIG CLIN: HCPCS | Performed by: INTERNAL MEDICINE

## 2021-04-27 PROCEDURE — 1036F TOBACCO NON-USER: CPT | Performed by: INTERNAL MEDICINE

## 2021-04-27 PROCEDURE — 4040F PNEUMOC VAC/ADMIN/RCVD: CPT | Performed by: INTERNAL MEDICINE

## 2021-04-27 PROCEDURE — 1123F ACP DISCUSS/DSCN MKR DOCD: CPT | Performed by: INTERNAL MEDICINE

## 2021-04-27 PROCEDURE — G8417 CALC BMI ABV UP PARAM F/U: HCPCS | Performed by: INTERNAL MEDICINE

## 2021-04-27 PROCEDURE — 1090F PRES/ABSN URINE INCON ASSESS: CPT | Performed by: INTERNAL MEDICINE

## 2021-04-27 PROCEDURE — G8400 PT W/DXA NO RESULTS DOC: HCPCS | Performed by: INTERNAL MEDICINE

## 2021-04-27 PROCEDURE — 99213 OFFICE O/P EST LOW 20 MIN: CPT | Performed by: INTERNAL MEDICINE

## 2021-04-27 RX ORDER — FLUTICASONE PROPIONATE 50 MCG
2 SPRAY, SUSPENSION (ML) NASAL DAILY
COMMUNITY
End: 2022-09-23

## 2021-04-27 RX ORDER — MULTIVIT WITH MINERALS/LUTEIN
500 TABLET ORAL DAILY
COMMUNITY
End: 2022-09-20

## 2021-04-27 RX ORDER — ZINC GLUCONATE 50 MG
50 TABLET ORAL DAILY
COMMUNITY
End: 2022-09-20

## 2021-04-27 RX ORDER — HYDROCODONE BITARTRATE AND ACETAMINOPHEN 5; 325 MG/1; MG/1
1 TABLET ORAL EVERY 6 HOURS PRN
Status: ON HOLD | COMMUNITY
End: 2022-07-25 | Stop reason: HOSPADM

## 2021-07-01 ENCOUNTER — OFFICE VISIT (OUTPATIENT)
Dept: CARDIAC SURGERY | Facility: CLINIC | Age: 71
End: 2021-07-01

## 2021-07-01 VITALS
SYSTOLIC BLOOD PRESSURE: 136 MMHG | BODY MASS INDEX: 29.89 KG/M2 | HEART RATE: 99 BPM | HEIGHT: 66 IN | OXYGEN SATURATION: 98 % | WEIGHT: 186 LBS | DIASTOLIC BLOOD PRESSURE: 74 MMHG

## 2021-07-01 DIAGNOSIS — S22.22XK CLOSED FRACTURE OF BODY OF STERNUM WITH NONUNION: Primary | ICD-10-CM

## 2021-07-01 PROCEDURE — 99203 OFFICE O/P NEW LOW 30 MIN: CPT | Performed by: SURGERY

## 2021-07-07 PROBLEM — S22.22XK CLOSED FRACTURE OF BODY OF STERNUM WITH NONUNION: Status: ACTIVE | Noted: 2021-07-07

## 2021-07-07 NOTE — PROGRESS NOTES
Thoracic Surgery Consultation    Referring Physician: GUNNAR Mccray    Primary Care Physician: GUNNAR Mccray    Chief Complaint   Patient presents with   • Sternal fracture     New patient from [facility].          Subjective     Ms. Rodriguez is a 71-year-old female who presents with a sternal fracture.  She had a fall at home on May 1 and fell onto her left side.  This was not a result of syncope but rather tripping.  It did not really hurt at first for the first couple weeks but then she started to experience pain with coughing and deep breathing.  Is starting to get better now but she was concerned as it was catching each time she took a deep breath.  She has no other history of trauma that she knows of.  She has never had chest, heart, lung surgery.  She is not a smoker.        Review of Systems   Constitutional: Negative for fatigue, fever and unexpected weight change.   Respiratory: Positive for chest tightness and shortness of breath.    Cardiovascular: Positive for chest pain.        A complete 10 system review of systems was performed, is negative except stated above.    Past Medical History:   Diagnosis Date   • Anemia    • Carotid bruit    • Diabetes mellitus (CMS/HCC)    • GERD (gastroesophageal reflux disease)    • Hyperlipidemia    • Hypertension      Past Surgical History:   Procedure Laterality Date   • ANKLE SURGERY Left     fracture repair   • COLONOSCOPY     • COLONOSCOPY N/A 3/23/2021    Procedure: COLONOSCOPY WITH ANESTHESIA;  Surgeon: Francis Elmore DO;  Location: [facility] ENDOSCOPY;  Service: Gastroenterology;  Laterality: N/A;  pre: hx of colon polyps  post: normal  Joceline Murguia APRN   • TOTAL KNEE ARTHROPLASTY Right      Family History   Problem Relation Age of Onset   • Cancer Mother         colon   • Colon cancer Mother    • Diabetes Brother    • Esophageal cancer Neg Hx      Social History     Tobacco Use   • Smoking status: Never Smoker   • Smokeless tobacco: Never Used   Vaping  Use   • Vaping Use: Never used   Substance Use Topics   • Alcohol use: No   • Drug use: No     Current Outpatient Medications   Medication Sig Dispense Refill   • acetaminophen (TYLENOL 8 HOUR ARTHRITIS PAIN) 650 MG 8 hr tablet Take 650 mg by mouth Every 8 (Eight) Hours As Needed for Mild Pain .     • aspirin 81 MG EC tablet      • atorvastatin (LIPITOR) 10 MG tablet Take 10 mg by mouth Daily.     • Cholecalciferol (VITAMIN D) 2000 UNITS capsule Take  by mouth.     • clorazepate (TRANXENE) 3.75 MG tablet Take 3.75 mg by mouth 2 (Two) Times a Day.     • Coenzyme Q10 (Co Q 10) 100 MG capsule Take 200 mg by mouth Daily.     • cyclobenzaprine (FLEXERIL) 5 MG tablet Take 5 mg by mouth 2 (Two) Times a Day As Needed.     • fluticasone (FLONASE) 50 MCG/ACT nasal spray 2 sprays into the nostril(s) as directed by provider Daily.     • HYDROcodone-acetaminophen (NORCO) 5-325 MG per tablet Take 1 tablet by mouth As Needed.     • indapamide (LOZOL) 1.25 MG tablet Take 1.25 mg by mouth Daily.     • iron polysaccharides (NIFEREX) 150 MG capsule Take 150 mg by mouth Daily.     • JANUVIA 50 MG tablet Take 50 mg by mouth Daily.     • levocetirizine (XYZAL) 5 MG tablet Take 5 mg by mouth As Needed for Allergies.     • losartan (COZAAR) 100 MG tablet Take 100 mg by mouth Daily.     • metFORMIN (GLUCOPHAGE) 500 MG tablet Take 500 mg by mouth 2 (Two) Times a Day With Meals.     • omeprazole (priLOSEC) 20 MG capsule Take 20 mg by mouth Daily.     • Psyllium (METAMUCIL FIBER PO) Take  by mouth Daily.     • rOPINIRole (REQUIP) 1 MG tablet Take 1 mg by mouth Every Night.     • tolterodine LA (DETROL LA) 4 MG 24 hr capsule Take 4 mg by mouth Every Night.     • vitamin C (ASCORBIC ACID) 250 MG tablet Take 250 mg by mouth Daily.     • Zinc Sulfate (ZINC 15 PO) Take  by mouth.       No current facility-administered medications for this visit.     Allergies:  Patient has no known allergies.    Objective      Vital Signs  Visit Vitals  /74  "(BP Location: Left arm, Patient Position: Sitting, Cuff Size: Adult)   Pulse 99   Ht 167.6 cm (66\")   Wt 84.4 kg (186 lb)   SpO2 98%   BMI 30.02 kg/m²         Physical Exam  Vitals reviewed.   Constitutional:       General: She is not in acute distress.     Appearance: She is well-developed. She is not diaphoretic.   HENT:      Head: Normocephalic and atraumatic.   Eyes:      General: No scleral icterus.     Pupils: Pupils are equal, round, and reactive to light.   Neck:      Vascular: No JVD.      Trachea: No tracheal deviation.   Cardiovascular:      Rate and Rhythm: Normal rate and regular rhythm.      Heart sounds: Normal heart sounds. No murmur heard.     Pulmonary:      Effort: Pulmonary effort is normal. No respiratory distress.      Breath sounds: Normal breath sounds. No stridor. No wheezing.   Abdominal:      General: There is no distension.      Palpations: Abdomen is soft.      Tenderness: There is no abdominal tenderness.   Musculoskeletal:         General: No deformity. Normal range of motion.      Cervical back: Normal range of motion and neck supple.   Skin:     General: Skin is warm and dry.      Capillary Refill: Capillary refill takes less than 2 seconds.      Coloration: Skin is not pale.      Findings: No erythema or rash.   Neurological:      Mental Status: She is alert and oriented to person, place, and time.      Cranial Nerves: No cranial nerve deficit.   Psychiatric:         Behavior: Behavior normal.         Thought Content: Thought content normal.         Judgment: Judgment normal.         Results Review:     No results found for: WBC, RBC, HGB, HCT, MCV, MCH, MCHC, RDW, RDWSD, MPV, PLT, NEUTRORELPCT, LYMPHORELPCT, MONORELPCT, EOSRELPCT, BASORELPCT, AUTOIGPER, NEUTROABS, LYMPHSABS, MONOSABS, EOSABS, BASOSABS, AUTOIGNUM, NRBC  No results found for: GLUCOSE, NA, K, CO2, CL, ANIONGAP, CREATININE, BUN, BCR, CALCIUM, EGFRIFNONA, ALKPHOS, PROTEINTOT, ALT, AST, BILITOT, ALBUMIN, GLOB, " LABIL2     I reviewed the patient's clinical results and discussed with patient.    CT Chest:        I personally reviewed images of following exams, the following is my interpretation:    CT Chest: Superior sternal fracture, nondisplaced, no surrounding inflammation          Assessment/Plan     Ms. Rodriguez is a 71-year-old female who presents with a nondisplaced superior sternal fracture.  On exam she is minimally tender at this location.  She has no limit on her range of motion of her upper extremities.  I discussed with her the natural history of a sternal fracture and treatment options.  Her symptoms are improving overall and mainly consist of lateral pain that I believe is likely referred pain from not great deep breathing.  We discussed over-the-counter NSAIDs as primary treatment for the pain.  She is agreeable to continue with this and continue aggressive deep breathing and coughing when needed.  While there is some portions that appear to be nonunion, if sternal fracture is asymptomatic no surgical treatment is needed.  I encouraged her that sometimes these take months before the symptoms are completely gone.  We discussed continue conservative management and if her symptoms do not improve over the next couple months she can see me back in clinic.  Otherwise she can follow-up as needed.  No weight limit restrictions at this point and no activity restrictions at this point.    Thank you for trusting me with the care of Ms. Rodriguez.  Please do not hesitate to call with any questions or concerns.    Daniel Joy M.D.  Cardiothoracic Surgeon

## 2021-09-17 ENCOUNTER — TELEPHONE (OUTPATIENT)
Dept: VASCULAR SURGERY | Facility: CLINIC | Age: 71
End: 2021-09-17

## 2021-09-17 NOTE — TELEPHONE ENCOUNTER
Spoke with Mrs Rodriguez reminding her of her appointment for Monday, September 20th, 2021 at 130 pm with Debbie MAHER. Mrs Rodriguez confirmed she would be here.

## 2021-09-20 ENCOUNTER — OFFICE VISIT (OUTPATIENT)
Dept: VASCULAR SURGERY | Facility: CLINIC | Age: 71
End: 2021-09-20

## 2021-09-20 VITALS
DIASTOLIC BLOOD PRESSURE: 84 MMHG | SYSTOLIC BLOOD PRESSURE: 126 MMHG | HEART RATE: 86 BPM | OXYGEN SATURATION: 99 % | HEIGHT: 66 IN | BODY MASS INDEX: 31.02 KG/M2 | WEIGHT: 193 LBS

## 2021-09-20 DIAGNOSIS — I80.01 THROMBOPHLEBITIS OF SUPERFICIAL VEINS OF RIGHT LOWER EXTREMITY: Primary | ICD-10-CM

## 2021-09-20 PROCEDURE — 99213 OFFICE O/P EST LOW 20 MIN: CPT | Performed by: NURSE PRACTITIONER

## 2021-09-20 RX ORDER — CHOLECALCIFEROL (VITAMIN D3) 125 MCG
500 CAPSULE ORAL EVERY OTHER DAY
COMMUNITY

## 2021-09-20 NOTE — PROGRESS NOTES
"9/20/2021       Joceline Murguia APRN   [address] [facility] [address]    Radha Rodriguez  1950    Chief Complaint   Patient presents with   • Follow-up     Patient having Right Leg Issues with the Veins. Patient denies any stroke like symptoms.    • Non Smoker     Patient is a Non Smoker    • Med Management     Verified medications from list patient/ brought in        Dear Joceline Murguia APRN       HPI  I had the pleasure of seeing your patient Radha Rodriguez in the office today.   As you recall, Radha Rodriguez is a 71 y.o.  female who we are following for asymptomatic carotid disease.  She is doing well denies any claudication symptoms to her lower extremities.  She also denies any strokelike symptoms.  She is maintained on aspirin and Lipitor.  She did have right knee surgery and was undergoing therapy noticed swelling to her right leg.  She did have noninvasive testing performed, which I did review in office.        Review of Systems   Constitutional: Negative.    HENT: Negative.    Eyes: Negative.    Respiratory: Negative.    Cardiovascular: Positive for leg swelling.   Gastrointestinal: Negative.    Endocrine: Negative.    Genitourinary: Negative.    Musculoskeletal: Negative.    Skin: Negative.    Allergic/Immunologic: Negative.    Neurological: Negative.    Hematological: Negative.    Psychiatric/Behavioral: Negative.         /84 (BP Location: Left arm, Patient Position: Sitting, Cuff Size: Adult)   Pulse 86   Ht 167.6 cm (66\")   Wt 87.5 kg (193 lb)   SpO2 99%   BMI 31.15 kg/m²      Physical Exam  Vitals and nursing note reviewed.   Constitutional:       General: She is not in acute distress.     Appearance: Normal appearance. She is well-developed. She is obese. She is not diaphoretic.   HENT:      Head: Normocephalic and atraumatic.   Eyes:      General: No scleral icterus.     Pupils: Pupils are equal, round, and reactive to light.   Neck:      Thyroid: No thyromegaly.      Vascular: " No carotid bruit or JVD.   Cardiovascular:      Rate and Rhythm: Normal rate and regular rhythm.      Pulses: Normal pulses.      Heart sounds: Normal heart sounds and S2 normal. No murmur heard.   No friction rub. No gallop.       Comments: Varicose veins to bilateral lower extremities  Pulmonary:      Effort: Pulmonary effort is normal.      Breath sounds: Normal breath sounds.   Abdominal:      General: Bowel sounds are normal.      Palpations: Abdomen is soft.   Musculoskeletal:         General: Normal range of motion.      Cervical back: Normal range of motion and neck supple.      Right lower leg: Edema present.   Skin:     General: Skin is warm and dry.   Neurological:      General: No focal deficit present.      Mental Status: She is alert and oriented to person, place, and time.      Cranial Nerves: No cranial nerve deficit.   Psychiatric:         Mood and Affect: Mood normal.         Behavior: Behavior normal.         Thought Content: Thought content normal.         Judgment: Judgment normal.         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  She does have some swelling to her right lower extremity.  I did review her testing which shows thrombosed varicose veins posterior to your knee.  I did recommend warm compresses and ibuprofen as needed.  She can also apply compression stocking to help with her swelling.  She is free to continue with her physical therapy if it is not bothering her.  We will see her back at her regular scheduled appointment with repeat  noninvasive testing for continued surveillance, including a carotid duplex.   I did discuss vascular risk factors as they pertain to the progression of vascular disease including controlling her hypertension, diabetes mellitus, and hyperlipidemia.  Her blood pressure stable on her current medication regimen.  She is maintained on Lipitor for hyperlipidemia.   Patient's Body mass index is 31.15 kg/m². BMI is above normal parameters. Recommendations include: educational material. This was all discussed in full with complete understanding.     Thank you for allowing me to participate in the care of your patient.  Please do not hesitate with any questions or concerns.  I will keep you aware of any further encounters with Radha Rodriguez.        Sincerely yours,         Debbie Reeder, GUNNAR Murguia, GUNNAR Aguiar

## 2021-10-19 NOTE — PROGRESS NOTES
Pierre Man   1950  10/26/2021     Chief Complaint   Patient presents with    Follow-up     Iron deficiency anemia secondary to blood loss (chronic)        INTERVAL HISTORY/HISTORY OF PRESENT ILLNESS:  Diagnosis   Iron deficiency secondary to upper GI ulcers   Normal colonoscopy January 2018. Treatment summary  PO Niferex every other day   Sep 2018-Injectafer 1500mg  Interval history  Mrs. Kadie Garnett is a 80year-old  female with a history of iron deficiency anemia secondary to GI blood loss from gastric ulcers. . She continues to take PO iron replacement, Niferex, once daily and tolerating without significant difficulty. She was recently seen by her primary care provider. Her hemoglobin was 10.6. She was taking Niferex every other day and was instructed to take it daily. She has no new complaints today. Denies any  melena, hematochezia, change in the color of her stools. She denies any epigastric pain. She denies any use of NSAIDs. She denies any hematuria. CBC today showed improvement of hemoglobin to 11.0. HEMATOLOGY HISTORY: Iron deficiency anemia 2/2 upper GI ulcers  Renato Hernandez was seen in initial hematology consultation on 3/98/9519 referred by Celestina GONZALEZ for persistent iron deficiency anemia. Renato Hernandez reports that she was started on Niferex 150 mg daily on 11/13/2017. A CBC that is available from 11/28/2017 revealed a WBC of 6, Hgb 10.8, MCV 86, PLT of 254,000. Serum iron was 57. Most recent follow-up endoscopy was performed on 6/11/2018 which revealed 2 superficial gastric ulcers within the antrum of the stomach and healing of the former duodenal ulcers. Mild to moderate gastritis of the antrum of the stomach was noted. She did have a distal esophageal stricture that was dilated. 1/29/2018-normal colonoscopy. Upper EGD showed 7 gastric ulcers and 4 duodenal ulcers.    CBC 2/13/2018 revealed a WBC of 6.7, Hgb 10.4 with MCV 85.3 and PLT of 222,000.   07/20/2018-WBC 8.2, hemoglobin 11.5, PLT Transportation Needs:     Lack of Transportation (Medical):  Lack of Transportation (Non-Medical):    Physical Activity:     Days of Exercise per Week:     Minutes of Exercise per Session:    Stress:     Feeling of Stress :    Social Connections:     Frequency of Communication with Friends and Family:     Frequency of Social Gatherings with Friends and Family:     Attends Hoah[employer]ism Services:     Active Member of Clubs or Organizations:     Attends Club or Organization Meetings:     Marital Status:    Intimate Partner Violence:     Fear of Current or Ex-Partner:     Emotionally Abused:     Physically Abused:     Sexually Abused:        FAMILY HISTORY:  Family History   Problem Relation Age of Onset    Colon Cancer Mother     Liver Cancer Mother         mets    Dementia Father         Current Outpatient Medications   Medication Sig Dispense Refill    vitamin B-12 (CYANOCOBALAMIN) 500 MCG tablet Take 500 mcg by mouth Taking every other day      cefdinir (OMNICEF) 300 MG capsule Take 300 mg by mouth 2 times daily      Ascorbic Acid (VITAMIN C) 250 MG tablet Take 500 mg by mouth daily      zinc gluconate 50 MG tablet Take 50 mg by mouth daily      Probiotic Product (PROBIOTIC DAILY PO) Take 1 tablet by mouth daily      HYDROcodone-acetaminophen (NORCO) 5-325 MG per tablet Take 1 tablet by mouth every 6 hours as needed for Pain.  fluticasone (FLONASE) 50 MCG/ACT nasal spray 2 sprays by Each Nostril route daily      omeprazole (PRILOSEC) 20 MG delayed release capsule Take 20 mg by mouth daily      clorazepate (TRANXENE) 3.75 MG tablet Take 3.75 mg by mouth nightly.       atorvastatin (LIPITOR) 10 MG tablet Take 10 mg by mouth every evening      cyclobenzaprine (FLEXERIL) 5 MG tablet Take 5 mg by mouth 2 times daily      iron polysaccharides (NIFEREX) 150 MG capsule Take 150 mg by mouth daily      indapamide (LOZOL) 1.25 MG tablet Take 1.25 mg by mouth Daily      losartan (COZAAR) 100 MG tablet Take 100 mg by mouth daily      metFORMIN (GLUCOPHAGE) 500 MG tablet Take 500 mg by mouth every morning (before breakfast)      rOPINIRole (REQUIP) 1 MG tablet Take 1 mg by mouth nightly as needed      tolterodine (DETROL LA) 4 MG extended release capsule Take 4 mg by mouth nightly      levocetirizine (XYZAL) 5 MG tablet Take 5 mg by mouth daily as needed      aspirin 81 MG tablet Take 81 mg by mouth daily      Coenzyme Q10 10 MG CAPS Take 200 mg by mouth daily      Cholecalciferol (VITAMIN D3) 50 MCG (2000 UT) CAPS Take 2,000 Units by mouth Daily      JANUVIA 50 MG tablet Take 50 mg by mouth Daily with supper (Patient not taking: Reported on 10/26/2021)       No current facility-administered medications for this visit. REVIEW OF SYSTEMS:    Constitutional: no fever, no night sweats, fatigue;   HEENT: no blurring of vision, no double vision, no hearing difficulty, no tinnitus,no ulceration, no dysphagia  Lungs:  cough, no shortness of breath, no wheeze;   CVS: no palpitation, no chest pain, no shortness of breath;  GI: no abdominal pain, no nausea , no vomiting, no constipation;   ELROY: no dysuria, frequency and urgency, no hematuria, no kidney stones;   Musculoskeletal: no joint pain, swelling , stiffness;   Endocrine: no polyuria, polydypsia, no cold or heat intolerence; Hematology/lymphatic: no easy brusing or bleeding, no hx of clotting disorder; no peripheral adenopathy. Dermatology: no skin rash, no eczema, no pruritis;   Psychiatry: no depression, no anxiety,no panic attacks, no suicide ideation;    Neurology: no syncope, no seizures, no numbness or tingling of hands, no numbness or tingling of feet, no paresis;      Vitals signs:  BP (!) 144/64   Pulse 108   Temp 98.6 °F (37 °C) (Oral)   Ht 5' 6\" (1.676 m)   Wt 193 lb (87.5 kg)   SpO2 98%   BMI 31.15 kg/m²    Pain scale:  Pain Score:   0 - No pain   PHYSICAL EXAM:    CONSTITUTIONAL: Alert, appropriate, no acute distress, EYES: Non icteric, EOM intact, pupils equal round and reactive to light and accommodation. ENT: Oral mucus membranes moist, no oral pharyngeal lesions. External inspection of ears and nose are normal.   NECK: Supple, no masses. No palpable thyroid mass    CHEST/LUNGS: CTA bilaterally, normal respiratory effort   CARDIOVASCULAR: tchycardic, no murmurs. No lower extremity edema   ABDOMEN: soft non-tender, active bowel sounds, no hepatosplenomegaly. No palpable masses. EXTREMITIES: warm, Full ROM of all fours extremities. No focal weakness. SKIN: warm, dry with no rashes or lesions  LYMPH: No cervical, clavicular, axillary, or inguinal lymphadenopathy  NEUROLOGIC: follows commands, non focal.   PSYCH: mood and affect appropriate. Alert and oriented to time and place and person. Relevant Lab findings/reviewed by me:          Relevant Imaging studies/reviewed by me:        ASSESSMENT    Orders Placed This Encounter   Procedures    Vitamin B12     Standing Status:   Future     Standing Expiration Date:   4/26/2023    Iron and TIBC     Standing Status:   Future     Standing Expiration Date:   4/26/2023     Order Specific Question:   Is Patient Fasting? Answer:   no     Order Specific Question:   No of Hours? Answer:   0    Ferritin     Standing Status:   Future     Standing Expiration Date:   4/26/2023    Comprehensive Metabolic Panel     Standing Status:   Future     Standing Expiration Date:   4/26/2023    CBC Auto Differential     Standing Status:   Future     Standing Expiration Date:   4/26/2023      Knute Sport was seen today for follow-up. Diagnoses and all orders for this visit:    Iron deficiency anemia secondary to blood loss (chronic)  -     Vitamin B12; Future  -     Iron and TIBC; Future  -     Ferritin; Future  -     Comprehensive Metabolic Panel;  Future  -     CBC Auto Differential; Future    Care plan discussed with patient    Lower respiratory infection    B12 deficiency Diagnosis   Iron deficiency secondary to upper GI ulcers   Normal colonoscopy January 2018.  3/23/21 Colonoscopy normal-return in 10 years  Treatment summary  PO Niferex every day   September 2018-Injectafer 1500mg  History of iron deficiency anemia secondary to upper GI ulcers:   CBC today reveals a hemoglobin of 10.7. Iron profile showed normal elevated ferritin and normal iron sat. Low UIBC and normal TIBC  Vanna Younger will stop taking the Niferex for now. RTC in 6 months with iron profile, B12 and CMP, CBC before next visit. B12 deficiency-continue oral B12 replacement. Summary of Plan:  1. CBC today  2. Discontinue Niferex daily  3. CMP CBC iron profile B12 1 week before next visit  4. RTC 6 months    Follow Up:     Return in about 6 months (around 4/26/2022) for Appointment with Dr. Silverio Angeles in [place]. Labs 1 week prior to visit     I, Robley Krabbe, am scribing for Marion Sidhu MD. Electronically signed by Robley Krabbe, RN on 10/26/2021 at 10:37 AM CDT. I, Dr Kang Duran, personally performed the services described in this documentation as scribed by Robley Krabbe, RN in my presence and is both accurate and complete. I have seen, examined and reviewed this patient medication list, appropriate labs and imaging studies. I reviewed relevant medical records and others physicians notes. I discussed the plans of care with the patient. I answered all the questions to the patients satisfaction. I have also reviewed the chief complaint (CC) and part of the history (History of Present Illness (HPI), Past Family Social History [facility]), or Review of Systems (ROS) and made changes when appropriated.        (Please note that portions of this note were completed with a voice recognition program. Efforts were made to edit the dictations but occasionally words are mis-transcribed.)    Electronically signed by Marion Sidhu MD on 10/26/2021 at 10:37 AM

## 2021-10-26 ENCOUNTER — OFFICE VISIT (OUTPATIENT)
Dept: HEMATOLOGY | Age: 71
End: 2021-10-26
Payer: MEDICARE

## 2021-10-26 VITALS
BODY MASS INDEX: 31.02 KG/M2 | TEMPERATURE: 98.6 F | SYSTOLIC BLOOD PRESSURE: 144 MMHG | HEART RATE: 108 BPM | HEIGHT: 66 IN | DIASTOLIC BLOOD PRESSURE: 64 MMHG | OXYGEN SATURATION: 98 % | WEIGHT: 193 LBS

## 2021-10-26 DIAGNOSIS — E53.8 B12 DEFICIENCY: ICD-10-CM

## 2021-10-26 DIAGNOSIS — Z71.89 CARE PLAN DISCUSSED WITH PATIENT: ICD-10-CM

## 2021-10-26 DIAGNOSIS — J22 LOWER RESPIRATORY INFECTION: ICD-10-CM

## 2021-10-26 DIAGNOSIS — D50.0 IRON DEFICIENCY ANEMIA SECONDARY TO BLOOD LOSS (CHRONIC): Primary | ICD-10-CM

## 2021-10-26 PROCEDURE — G8400 PT W/DXA NO RESULTS DOC: HCPCS | Performed by: INTERNAL MEDICINE

## 2021-10-26 PROCEDURE — 1036F TOBACCO NON-USER: CPT | Performed by: INTERNAL MEDICINE

## 2021-10-26 PROCEDURE — 1090F PRES/ABSN URINE INCON ASSESS: CPT | Performed by: INTERNAL MEDICINE

## 2021-10-26 PROCEDURE — 4040F PNEUMOC VAC/ADMIN/RCVD: CPT | Performed by: INTERNAL MEDICINE

## 2021-10-26 PROCEDURE — 99213 OFFICE O/P EST LOW 20 MIN: CPT | Performed by: INTERNAL MEDICINE

## 2021-10-26 PROCEDURE — G8427 DOCREV CUR MEDS BY ELIG CLIN: HCPCS | Performed by: INTERNAL MEDICINE

## 2021-10-26 PROCEDURE — 3017F COLORECTAL CA SCREEN DOC REV: CPT | Performed by: INTERNAL MEDICINE

## 2021-10-26 PROCEDURE — 1123F ACP DISCUSS/DSCN MKR DOCD: CPT | Performed by: INTERNAL MEDICINE

## 2021-10-26 PROCEDURE — G8417 CALC BMI ABV UP PARAM F/U: HCPCS | Performed by: INTERNAL MEDICINE

## 2021-10-26 PROCEDURE — G8484 FLU IMMUNIZE NO ADMIN: HCPCS | Performed by: INTERNAL MEDICINE

## 2021-10-26 RX ORDER — CHOLECALCIFEROL (VITAMIN D3) 125 MCG
500 CAPSULE ORAL
COMMUNITY
End: 2022-09-20

## 2021-10-26 RX ORDER — CEFDINIR 300 MG/1
300 CAPSULE ORAL 2 TIMES DAILY
COMMUNITY
End: 2022-07-11

## 2021-12-14 ENCOUNTER — TELEPHONE (OUTPATIENT)
Dept: VASCULAR SURGERY | Facility: CLINIC | Age: 71
End: 2021-12-14

## 2021-12-14 NOTE — TELEPHONE ENCOUNTER
Left message reminding Mrs Rodriguez of her appointment for Wednesday, December 15th, 2021. Reminded [facility] Rodriguez to [facility] at 1230 pm for 1 o'clock testing and follow up afterwards at 2 pm with Debbie MAHER. Also advised Mrs Rodriguez if she had any questions, concerns or needs to reschedule to please call the office at [phone].

## 2021-12-15 ENCOUNTER — OFFICE VISIT (OUTPATIENT)
Dept: VASCULAR SURGERY | Facility: CLINIC | Age: 71
End: 2021-12-15

## 2021-12-15 ENCOUNTER — HOSPITAL ENCOUNTER (OUTPATIENT)
Dept: ULTRASOUND IMAGING | Facility: HOSPITAL | Age: 71
Discharge: HOME | End: 2021-12-15
Admitting: NURSE PRACTITIONER

## 2021-12-15 VITALS
DIASTOLIC BLOOD PRESSURE: 84 MMHG | BODY MASS INDEX: 31.02 KG/M2 | HEIGHT: 66 IN | OXYGEN SATURATION: 95 % | SYSTOLIC BLOOD PRESSURE: 132 MMHG | WEIGHT: 193 LBS | HEART RATE: 101 BPM

## 2021-12-15 DIAGNOSIS — E78.5 HYPERLIPIDEMIA, UNSPECIFIED HYPERLIPIDEMIA TYPE: ICD-10-CM

## 2021-12-15 DIAGNOSIS — I65.23 BILATERAL CAROTID ARTERY STENOSIS: Primary | ICD-10-CM

## 2021-12-15 DIAGNOSIS — I10 ESSENTIAL HYPERTENSION: ICD-10-CM

## 2021-12-15 DIAGNOSIS — I65.23 BILATERAL CAROTID ARTERY STENOSIS: ICD-10-CM

## 2021-12-15 PROCEDURE — 99214 OFFICE O/P EST MOD 30 MIN: CPT | Performed by: NURSE PRACTITIONER

## 2021-12-15 PROCEDURE — 93880 EXTRACRANIAL BILAT STUDY: CPT

## 2021-12-15 PROCEDURE — 93880 EXTRACRANIAL BILAT STUDY: CPT | Performed by: SURGERY

## 2021-12-23 NOTE — PROGRESS NOTES
"12/15/2021       Crow Landa MD   [address] [facility] [address]    Radha Rodriguez  1950    Chief Complaint   Patient presents with   • Follow-up     1 Year Follow Up For Bilateral Carotid Artery Stenosis. Test [id number] US pad carotid bilateral. Patient denies any stroke like symptoms.    • Non Smoker     Patient is a Non Smoker    • Med Management     Verbally verified medications with patient        Dear Crow Landa MD       HPI  I had the pleasure of seeing your patient Radha Rodriguez in the office today.   As you recall, Radha Rodriguez is a 71 y.o.  female who we are following for asymptomatic carotid occlusive disease.  Currently she is doing well and denies any strokelike symptoms.  She also denies any claudication to her lower extremities.  She is maintained on aspirin and and Lipitor.  She did have noninvasive testing performed today, which I did review in office.  We are following for asymptomatic carotid disease.  She is doing well denies any claudication symptoms to her lower extremities.  She also denies any strokelike symptoms.  She is maintained on aspirin and Lipitor.  She did have right knee surgery and was undergoing therapy noticed swelling to her right leg.  She did have noninvasive testing performed, which I did review in office.      Review of Systems   Constitutional: Negative.    HENT: Negative.    Eyes: Negative.    Respiratory: Negative.    Cardiovascular: Positive for leg swelling.   Gastrointestinal: Negative.    Endocrine: Negative.    Genitourinary: Negative.    Musculoskeletal: Negative.    Skin: Negative.    Allergic/Immunologic: Negative.    Neurological: Negative.    Hematological: Negative.    Psychiatric/Behavioral: Negative.         /84 (BP Location: Right arm, Patient Position: Sitting, Cuff Size: Adult)   Pulse 101   Ht 167.6 cm (66\")   Wt 87.5 kg (193 lb)   SpO2 95%   BMI 31.15 kg/m²   Physical Exam  Vitals and nursing note reviewed. "   Constitutional:       General: She is not in acute distress.     Appearance: Normal appearance. She is well-developed. She is obese. She is not diaphoretic.   HENT:      Head: Normocephalic and atraumatic.   Eyes:      General: No scleral icterus.     Pupils: Pupils are equal, round, and reactive to light.   Neck:      Thyroid: No thyromegaly.      Vascular: No carotid bruit or JVD.   Cardiovascular:      Rate and Rhythm: Normal rate and regular rhythm.      Pulses: Normal pulses.      Heart sounds: Normal heart sounds and S2 normal. No murmur heard.  No friction rub. No gallop.       Comments: Varicose veins to bilateral lower extremities.  Pulmonary:      Effort: Pulmonary effort is normal.      Breath sounds: Normal breath sounds.   Abdominal:      General: Bowel sounds are normal.      Palpations: Abdomen is soft.   Musculoskeletal:         General: Swelling present. Normal range of motion.      Cervical back: Normal range of motion and neck supple.   Skin:     General: Skin is warm and dry.   Neurological:      General: No focal deficit present.      Mental Status: She is alert and oriented to person, place, and time.      Cranial Nerves: No cranial nerve deficit.   Psychiatric:         Mood and Affect: Mood normal.         Behavior: Behavior normal.         Thought Content: Thought content normal.         Judgment: Judgment normal.           Diagnostic Data:  US Carotid Bilateral    Result Date: 12/15/2021  Narrative: History: Carotid occlusive disease      Impression: Impression: 1. There is 50-69% stenosis of the right internal carotid artery. 2. There is less than 50% stenosis of the left internal carotid artery. 3. Antegrade flow is demonstrated in bilateral vertebral arteries.  Comments: Bilateral carotid vertebral arterial duplex scan was performed.  Grayscale imaging shows intimal thickening and calcified elements at the carotid bifurcation. The right internal carotid artery peak systolic velocity is  136.6 cm/sec. The end-diastolic velocity is 35.6 cm/sec. The right ICA/CCA ratio is approximately 1.0 . These findings correlate with 50-69% stenosis of the right internal carotid artery.  Grayscale imaging shows intimal thickening and calcified elements at the carotid bifurcation. The left internal carotid artery peak systolic velocity is 109.1 cm/sec. The end-diastolic velocity is 30.8 cm/sec. The left ICA/CCA ratio is approximately 1.0 . These findings correlate with less than 50% stenosis of the left internal carotid artery.  Antegrade flow is demonstrated in bilateral vertebral arteries. There is greater than 50% stenosis of the right common carotid artery. This report was finalized on 12/15/2021 19:29 by Dr. Mario Saldana MD.         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 Bilateral carotid artery stenosis  I65.23 433.10     433.30   2. Essential hypertension  I10 401.9   3. Hyperlipidemia, unspecified hyperlipidemia type  E78.5 272.4       Plan: After thoroughly evaluating Radha Rodriguez, I believe the best course of action is to remain conservative from vascular surgery standpoint.  Currently she is doing well and denies any strokelike symptoms.  I did review her testing which shows 50 to 69% right carotid stenosis and less than 50% left carotid stenosis.  We will see her back in 1 year with repeat noninvasive testing for continued surveillance, including a carotid duplex.  I would encourage her to continue wearing compression stockings to help with her swelling.  I did discuss vascular risk factors as they pertain to the aggression of vascular disease including controlling her hypertension, diabetes, and hyperlipidemia.  Her blood pressure stable on her current medication regimen.  She is maintained on Lipitor for  hyperlipidemia.  She can continue taking her medications as previously discussed.  This was all discussed in full with complete understanding.     Thank you for allowing me to participate in the care of your patient.  Please do not hesitate with any questions or concerns.  I will keep you aware of any further encounters with Radha Rodriguez.        Sincerely yours,         GUNNAR Damon Jonathan Patmor, MD

## 2022-04-25 NOTE — PROGRESS NOTES
Jori Ernesto   1950 4/26/2022     Chief Complaint   Patient presents with    Follow-up     Iron deficiency anemia secondary to blood loss (chronic)        INTERVAL HISTORY/HISTORY OF PRESENT ILLNESS:  The patient is a pleasant 71years old female who has been diagnosed with iron deficiency anemia. In addition, she has chronic kidney disease stage IIIb. She has a history hypertension/diabetes. She is followed by her PCP and also nephrology. Most recent CMP showed a creatinine 1.4/GFR 39. She denies any blood in her stools. Denies any blood in her urine. She took 2 weeks worth of Niferex back in February. CBC now showing hemoglobin 10.8. Last hemoglobin was 10.7. Diagnosis   · Iron deficiency secondary to upper GI ulcers   · Normal colonoscopy January 2018. · Chronic kidney disease stage IIIb  Treatment summary  · PO Niferex every other day   · Sep 2018-Injectafer 1500mg      Interval history  Mrs. Gabe Antony is a 71year-old  female with a history of iron deficiency anemia secondary to GI blood loss from gastric ulcers. . She continues to take PO iron replacement, Niferex, once daily and tolerating without significant difficulty. She was recently seen by her primary care provider. Her hemoglobin was 10.6. She was taking Niferex every other day and was instructed to take it daily. She has no new complaints today. Denies any  melena, hematochezia, change in the color of her stools. She denies any epigastric pain. She denies any use of NSAIDs. She denies any hematuria. CBC today showed improvement of hemoglobin to 11.0. HEMATOLOGY HISTORY: Iron deficiency anemia 2/2 upper GI ulcers  Marquis Strickland was seen in initial hematology consultation on 0/01/0871 referred by Annabel GONZALEZ for persistent iron deficiency anemia. Marquis Strickland reports that she was started on Niferex 150 mg daily on 11/13/2017. A CBC that is available from 11/28/2017 revealed a WBC of 6, Hgb 10.8, MCV 86, PLT of 254,000.  Serum iron was 57. Most recent follow-up endoscopy was performed on 6/11/2018 which revealed 2 superficial gastric ulcers within the antrum of the stomach and healing of the former duodenal ulcers. Mild to moderate gastritis of the antrum of the stomach was noted. She did have a distal esophageal stricture that was dilated. · 1/29/2018normal colonoscopy. Upper EGD showed 7 gastric ulcers and 4 duodenal ulcers. · CBC 2/13/2018 revealed a WBC of 6.7, Hgb 10.4 with MCV 85.3 and PLT of 222,000. · 07/20/2018WBC 8.2, hemoglobin 11.5, PLT of 213,000. Ferritin 52, iron saturation 14%, TIBC 258. · 9/5/2018recommended 2 doses of IV Injectafer 750 mg.   · September 2018- s/p 1500mg Injectafer IV. · 10/23/2018- CBC Hb 11.8/MCV90. Normal WBC/Plts. · 1/22/2019CBC with hemoglobin 11.6. · 7/23/2019- CBC with a hemoglobin of 11.9 and MCV 92.1, continues to take Niferex daily  · 01/21/2020- CBC Hb 11.4/91. WBC 5.2, Platelets 774V. Iron 46, TIBC 234, iron saturation 20%  · 4/6/2021B12 461  · 4/27/2021hemoglobin 11, WBC 5.9, platelets 614,477  · 4/27/2021continue Niferex 100 mg p.o. daily  · 4/22/2022 hemoglobin 0.8, creatinine 1.4, GFR 39, ferritin 255, TIBC 225, iron saturation 18%.   · 4/26/2022 resume iron sulfate 325 mg p.o. daily        PAST MEDICAL HISTORY:   Past Medical History:   Diagnosis Date    Carotid stenosis     Colonic polyp     DM [facility]di[facility] mellitus) ([facility] [address]     Essential hypertension     Gastric ulcer     duodenal    GERD (gastroesophageal reflux disease)     Hiatal hernia     Hyperlipidemia     Iron deficiency anemia secondary to blood loss (chronic)     OA (osteoarthritis)     Status post dilatation of esophageal stricture           PAST SURGICAL HISTORY:  Past Surgical History:   Procedure Laterality Date    ANKLE SURGERY Left     ORIF    COLONOSCOPY  01/2018    Dr Sanchez Murillo Right     UPPER GASTROINTESTINAL ENDOSCOPY  06/11/2018    Dr Ronen Ashley Dementia Father         Current Outpatient Medications   Medication Sig Dispense Refill    estradiol (ESTRACE) 0.1 MG/GM vaginal cream Place 2 g vaginally daily      vitamin B-12 (CYANOCOBALAMIN) 500 MCG tablet Take 500 mcg by mouth Taking every other day      cefdinir (OMNICEF) 300 MG capsule Take 300 mg by mouth 2 times daily      Ascorbic Acid (VITAMIN C) 250 MG tablet Take 500 mg by mouth daily      zinc gluconate 50 MG tablet Take 50 mg by mouth daily      Probiotic Product (PROBIOTIC DAILY PO) Take 1 tablet by mouth daily      HYDROcodone-acetaminophen (NORCO) 5-325 MG per tablet Take 1 tablet by mouth every 6 hours as needed for Pain.  fluticasone (FLONASE) 50 MCG/ACT nasal spray 2 sprays by Each Nostril route daily      omeprazole (PRILOSEC) 20 MG delayed release capsule Take 20 mg by mouth daily      clorazepate (TRANXENE) 3.75 MG tablet Take 3.75 mg by mouth nightly.  atorvastatin (LIPITOR) 10 MG tablet Take 10 mg by mouth every evening      cyclobenzaprine (FLEXERIL) 5 MG tablet Take 5 mg by mouth 2 times daily      iron polysaccharides (NIFEREX) 150 MG capsule Take 150 mg by mouth daily      indapamide (LOZOL) 1.25 MG tablet Take 1.25 mg by mouth Daily      JANUVIA 50 MG tablet Take 50 mg by mouth Daily with supper       losartan (COZAAR) 100 MG tablet Take 100 mg by mouth daily      metFORMIN (GLUCOPHAGE) 500 MG tablet Take 500 mg by mouth every morning (before breakfast)      rOPINIRole (REQUIP) 1 MG tablet Take 1 mg by mouth nightly as needed      tolterodine (DETROL LA) 4 MG extended release capsule Take 4 mg by mouth nightly      levocetirizine (XYZAL) 5 MG tablet Take 5 mg by mouth daily as needed      aspirin 81 MG tablet Take 81 mg by mouth daily      Coenzyme Q10 10 MG CAPS Take 200 mg by mouth daily      Cholecalciferol (VITAMIN D3) 50 MCG (2000 UT) CAPS Take 2,000 Units by mouth Daily       No current facility-administered medications for this visit. REVIEW OF SYSTEMS:    Constitutional: no fever, no night sweats, fatigue;   HEENT: no blurring of vision, no double vision, no hearing difficulty, no tinnitus,no ulceration, no dysphagia  Lungs:  cough, no shortness of breath, no wheeze;   CVS: no palpitation, no chest pain, no shortness of breath;  GI: no abdominal pain, no nausea , no vomiting, no constipation;   ELROY: no dysuria, frequency and urgency, no hematuria, no kidney stones;   Musculoskeletal: no joint pain, swelling , stiffness;   Endocrine: no polyuria, polydypsia, no cold or heat intolerence; Hematology/lymphatic: no easy brusing or bleeding, no hx of clotting disorder; no peripheral adenopathy. Dermatology: no skin rash, no eczema, no pruritis;   Psychiatry: no depression, no anxiety,no panic attacks, no suicide ideation; Neurology: no syncope, no seizures, no numbness or tingling of hands, no numbness or tingling of feet, no paresis;      Vitals signs:  BP (!) 146/72   Pulse 92   Ht 5' 6\" (1.676 m)   Wt 194 lb (88 kg)   SpO2 96%   BMI 31.31 kg/m²    Pain scale:  Pain Score:   0 - No pain   PHYSICAL EXAM:    CONSTITUTIONAL: Alert, appropriate, no acute distress,   EYES: Non icteric, EOM intact, pupils equal round and reactive to light and accommodation. ENT: Oral mucus membranes moist, no oral pharyngeal lesions. External inspection of ears and nose are normal.   NECK: Supple, no masses. No palpable thyroid mass    CHEST/LUNGS: CTA bilaterally, normal respiratory effort   CARDIOVASCULAR: tchycardic, no murmurs. No lower extremity edema   ABDOMEN: soft non-tender, active bowel sounds, no hepatosplenomegaly. No palpable masses. EXTREMITIES: warm, Full ROM of all fours extremities. No focal weakness. SKIN: warm, dry with no rashes or lesions  LYMPH: No cervical, clavicular, axillary, or inguinal lymphadenopathy  NEUROLOGIC: follows commands, non focal.   PSYCH: mood and affect appropriate.  Alert and oriented to time and place and person. Relevant Lab findings/reviewed by me:  4/19/22 CMP/Iron Studies [facility]    4/19/2022 CBC([facility])  WBC 6.5  HGB 10.8    Neut 3.9  Ferritin 225    Relevant Imaging studies/reviewed by me:  None      ASSESSMENT    Orders Placed This Encounter   Procedures    Iron and TIBC     Standing Status:   Future     Standing Expiration Date:   10/26/2023     Order Specific Question:   Is Patient Fasting? Answer:   no     Order Specific Question:   No of Hours? Answer:   0    Ferritin     Standing Status:   Future     Standing Expiration Date:   10/26/2023    CBC WITH DIFFERENTIAL/PLATELET     Standing Status:   Future     Standing Expiration Date:   10/26/2023    Comprehensive Metabolic Panel     Standing Status:   Future     Standing Expiration Date:   10/26/2023      Edie Swift was seen today for follow-up. Diagnoses and all orders for this visit:    Iron deficiency anemia secondary to blood loss (chronic)  -     Iron and TIBC; Future  -     Ferritin; Future  -     CBC WITH DIFFERENTIAL/PLATELET; Future  -     Comprehensive Metabolic Panel; Future    Anemia in stage 3 chronic kidney disease, unspecified whether stage 3a or 3b CKD (HCC)    Type 2 diabetes mellitus without complication, unspecified whether long term insulin use ([facility] [address]    Uncontrolled hypertension         History of iron deficiency anemia secondary to upper GI ulcers (healed): Anemia is multifactorial to include CKD stage IIIb, anemia of iron deficiency, anemia of chronic disease  CBC today reveals a hemoglobin 10.8  April 2022ferritin 225, iron saturation 18%, TIBC 260  Creatinine 1.4, GFR 39  Plan:  -Resume iron sulfate 325 mg p.o. daily     B12 deficiencycontinue oral B12 replacement.     DM type IIlast hemoglobin A1c 7    Hypertension-systolic blood pressure slightly higher  BP (!) 146/72   Pulse 92   Ht 5' 6\" (1.676 m)   Wt 194 lb (88 kg)   SpO2 96%   BMI 31.31 kg/m²   -Educated about keeping her blood pressure under control      Summary of Plan:  · RTC with MD in [place] office 6 months  · CBC CMP Iron profile Ferritin prior to next visit  · Recommend OTC Ferrous Sulfate daily 325 mg  · Follow-up with nephrology        Follow Up:     Return in about 6 months (around 10/26/2022) for Labe prior to next visit, Appointment with Dr. Anthony Booth in [place]. Labs prior to next visit     Paola Limon am pre charting  as Medical Assistant for Timur Leon MD. Electronically signed by Franco Sebastian MA on 4/26/2022 at 2:57 PM CDT. Shad Senior am scribing for Timur Leon MD. Electronically signed by Rina Campos, RN on 4/26/2022 at 10:44 AM CDT. I, Dr Delia Yu, personally performed the services described in this documentation as scribed by Rina Campos, RN in my presence and is both accurate and complete. I have seen, examined and reviewed this patient medication list, appropriate labs and imaging studies. I reviewed relevant medical records and others physicians notes. I discussed the plans of care with the patient. I answered all the questions to the patients satisfaction. I have also reviewed the chief complaint (CC) and part of the history (History of Present Illness (HPI), Past Family Social History [facility]), or Review of Systems (ROS) and made changes when appropriated.        (Please note that portions of this note were completed with a voice recognition program. Efforts were made to edit the dictations but occasionally words are mis-transcribed.)    Electronically signed by Timur eLon MD on 4/26/2022 at 10:57 AM

## 2022-04-26 ENCOUNTER — OFFICE VISIT (OUTPATIENT)
Dept: HEMATOLOGY | Age: 72
End: 2022-04-26
Payer: MEDICARE

## 2022-04-26 VITALS
HEART RATE: 92 BPM | OXYGEN SATURATION: 96 % | DIASTOLIC BLOOD PRESSURE: 72 MMHG | BODY MASS INDEX: 31.18 KG/M2 | WEIGHT: 194 LBS | HEIGHT: 66 IN | SYSTOLIC BLOOD PRESSURE: 146 MMHG

## 2022-04-26 DIAGNOSIS — D50.0 IRON DEFICIENCY ANEMIA SECONDARY TO BLOOD LOSS (CHRONIC): Primary | ICD-10-CM

## 2022-04-26 DIAGNOSIS — N18.30 ANEMIA IN STAGE 3 CHRONIC KIDNEY DISEASE, UNSPECIFIED WHETHER STAGE 3A OR 3B CKD (HCC): ICD-10-CM

## 2022-04-26 DIAGNOSIS — D63.1 ANEMIA IN STAGE 3 CHRONIC KIDNEY DISEASE, UNSPECIFIED WHETHER STAGE 3A OR 3B CKD (HCC): ICD-10-CM

## 2022-04-26 DIAGNOSIS — I10 UNCONTROLLED HYPERTENSION: ICD-10-CM

## 2022-04-26 DIAGNOSIS — E11.9 TYPE 2 DIABETES MELLITUS WITHOUT COMPLICATION, UNSPECIFIED WHETHER LONG TERM INSULIN USE (HCC): ICD-10-CM

## 2022-04-26 PROCEDURE — 1123F ACP DISCUSS/DSCN MKR DOCD: CPT | Performed by: INTERNAL MEDICINE

## 2022-04-26 PROCEDURE — G8428 CUR MEDS NOT DOCUMENT: HCPCS | Performed by: INTERNAL MEDICINE

## 2022-04-26 PROCEDURE — 2022F DILAT RTA XM EVC RTNOPTHY: CPT | Performed by: INTERNAL MEDICINE

## 2022-04-26 PROCEDURE — 1090F PRES/ABSN URINE INCON ASSESS: CPT | Performed by: INTERNAL MEDICINE

## 2022-04-26 PROCEDURE — G8417 CALC BMI ABV UP PARAM F/U: HCPCS | Performed by: INTERNAL MEDICINE

## 2022-04-26 PROCEDURE — 3017F COLORECTAL CA SCREEN DOC REV: CPT | Performed by: INTERNAL MEDICINE

## 2022-04-26 PROCEDURE — 3046F HEMOGLOBIN A1C LEVEL >9.0%: CPT | Performed by: INTERNAL MEDICINE

## 2022-04-26 PROCEDURE — 4040F PNEUMOC VAC/ADMIN/RCVD: CPT | Performed by: INTERNAL MEDICINE

## 2022-04-26 PROCEDURE — G8400 PT W/DXA NO RESULTS DOC: HCPCS | Performed by: INTERNAL MEDICINE

## 2022-04-26 PROCEDURE — 1036F TOBACCO NON-USER: CPT | Performed by: INTERNAL MEDICINE

## 2022-04-26 PROCEDURE — 99214 OFFICE O/P EST MOD 30 MIN: CPT | Performed by: INTERNAL MEDICINE

## 2022-04-26 RX ORDER — ESTRADIOL 0.1 MG/G
2 CREAM VAGINAL DAILY
COMMUNITY

## 2022-05-10 ENCOUNTER — HOSPITAL ENCOUNTER (OUTPATIENT)
Dept: NUCLEAR MEDICINE | Age: 72
Discharge: HOME | End: 2022-05-12
Payer: MEDICARE

## 2022-05-10 ENCOUNTER — HOSPITAL ENCOUNTER (OUTPATIENT)
Dept: CT IMAGING | Age: 72
Discharge: HOME | End: 2022-05-10
Payer: MEDICARE

## 2022-05-10 DIAGNOSIS — Z96.651 PRESENCE OF RIGHT ARTIFICIAL KNEE JOINT: ICD-10-CM

## 2022-05-10 DIAGNOSIS — M25.361 KNEE INSTABILITY, RIGHT: ICD-10-CM

## 2022-05-10 LAB
BASOPHILS ABSOLUTE: 0 K/UL (ref 0–0.2)
BASOPHILS RELATIVE PERCENT: 0.6 % (ref 0–1)
C-REACTIVE PROTEIN: 1.99 MG/DL (ref 0–0.5)
EOSINOPHILS ABSOLUTE: 0.3 K/UL (ref 0–0.6)
EOSINOPHILS RELATIVE PERCENT: 3.8 % (ref 0–5)
HCT VFR BLD CALC: 35.8 % (ref 37–47)
HEMOGLOBIN: 10.9 G/DL (ref 12–16)
IMMATURE GRANULOCYTES #: 0 K/UL
LYMPHOCYTES ABSOLUTE: 1.4 K/UL (ref 1.1–4.5)
LYMPHOCYTES RELATIVE PERCENT: 19 % (ref 20–40)
MCH RBC QN AUTO: 27.4 PG (ref 27–31)
MCHC RBC AUTO-ENTMCNC: 30.4 G/DL (ref 33–37)
MCV RBC AUTO: 89.9 FL (ref 81–99)
MONOCYTES ABSOLUTE: 0.7 K/UL (ref 0–0.9)
MONOCYTES RELATIVE PERCENT: 9.7 % (ref 0–10)
NEUTROPHILS ABSOLUTE: 4.7 K/UL (ref 1.5–7.5)
NEUTROPHILS RELATIVE PERCENT: 66.5 % (ref 50–65)
PDW BLD-RTO: 14.2 % (ref 11.5–14.5)
PLATELET # BLD: 262 K/UL (ref 130–400)
PMV BLD AUTO: 9 FL (ref 9.4–12.3)
RBC # BLD: 3.98 M/UL (ref 4.2–5.4)
SEDIMENTATION RATE, ERYTHROCYTE: 48 MM/HR (ref 0–25)
WBC # BLD: 7.1 K/UL (ref 4.8–10.8)

## 2022-05-10 PROCEDURE — A9561 TC99M OXIDRONATE: HCPCS | Performed by: ORTHOPAEDIC SURGERY

## 2022-05-10 PROCEDURE — 3430000000 HC RX DIAGNOSTIC RADIOPHARMACEUTICAL: Performed by: ORTHOPAEDIC SURGERY

## 2022-05-10 PROCEDURE — 78315 BONE IMAGING 3 PHASE: CPT

## 2022-05-10 PROCEDURE — 73700 CT LOWER EXTREMITY W/O DYE: CPT

## 2022-05-10 RX ADMIN — TECHNETIUM TC 99M OXIDRONATE 20 MILLICURIE: 3.15 INJECTION, POWDER, LYOPHILIZED, FOR SOLUTION INTRAVENOUS at 13:35

## 2022-05-25 LAB
BASOPHILS ABSOLUTE: 0.1 K/UL (ref 0–0.2)
BASOPHILS RELATIVE PERCENT: 0.6 % (ref 0–1)
C-REACTIVE PROTEIN: 1.36 MG/DL (ref 0–0.5)
EOSINOPHILS ABSOLUTE: 0.3 K/UL (ref 0–0.6)
EOSINOPHILS RELATIVE PERCENT: 3.5 % (ref 0–5)
HCT VFR BLD CALC: 36.5 % (ref 37–47)
HEMOGLOBIN: 10.9 G/DL (ref 12–16)
IMMATURE GRANULOCYTES #: 0 K/UL
LYMPHOCYTES ABSOLUTE: 1.8 K/UL (ref 1.1–4.5)
LYMPHOCYTES RELATIVE PERCENT: 22.6 % (ref 20–40)
MCH RBC QN AUTO: 27.3 PG (ref 27–31)
MCHC RBC AUTO-ENTMCNC: 29.9 G/DL (ref 33–37)
MCV RBC AUTO: 91.3 FL (ref 81–99)
MONOCYTES ABSOLUTE: 0.7 K/UL (ref 0–0.9)
MONOCYTES RELATIVE PERCENT: 9.3 % (ref 0–10)
NEUTROPHILS ABSOLUTE: 4.9 K/UL (ref 1.5–7.5)
NEUTROPHILS RELATIVE PERCENT: 63.5 % (ref 50–65)
PDW BLD-RTO: 14.1 % (ref 11.5–14.5)
PLATELET # BLD: 342 K/UL (ref 130–400)
PMV BLD AUTO: 9.2 FL (ref 9.4–12.3)
RBC # BLD: 4 M/UL (ref 4.2–5.4)
SEDIMENTATION RATE, ERYTHROCYTE: 60 MM/HR (ref 0–25)
WBC # BLD: 7.8 K/UL (ref 4.8–10.8)

## 2022-07-11 ENCOUNTER — HOSPITAL ENCOUNTER (OUTPATIENT)
Dept: PREADMISSION TESTING | Age: 72
Discharge: HOME | End: 2022-07-15
Payer: MEDICARE

## 2022-07-11 VITALS — HEIGHT: 66 IN | WEIGHT: 195 LBS | BODY MASS INDEX: 31.34 KG/M2

## 2022-07-11 LAB
ABO/RH: NORMAL
ANION GAP SERPL CALCULATED.3IONS-SCNC: 9 MMOL/L (ref 7–19)
ANTIBODY SCREEN: NORMAL
APTT: 30.8 SEC (ref 26–36.2)
BASOPHILS ABSOLUTE: 0 K/UL (ref 0–0.2)
BASOPHILS RELATIVE PERCENT: 0.5 % (ref 0–1)
BUN BLDV-MCNC: 26 MG/DL (ref 8–23)
CALCIUM SERPL-MCNC: 10.7 MG/DL (ref 8.8–10.2)
CHLORIDE BLD-SCNC: 103 MMOL/L (ref 98–111)
CO2: 28 MMOL/L (ref 22–29)
CREAT SERPL-MCNC: 1 MG/DL (ref 0.5–0.9)
EKG P AXIS: -16 DEGREES
EKG P-R INTERVAL: 138 MS
EKG Q-T INTERVAL: 350 MS
EKG QRS DURATION: 76 MS
EKG QTC CALCULATION (BAZETT): 393 MS
EKG T AXIS: 34 DEGREES
EOSINOPHILS ABSOLUTE: 0.4 K/UL (ref 0–0.6)
EOSINOPHILS RELATIVE PERCENT: 5.4 % (ref 0–5)
GFR AFRICAN AMERICAN: >59
GFR NON-AFRICAN AMERICAN: 55
GLUCOSE BLD-MCNC: 87 MG/DL (ref 74–109)
HBA1C MFR BLD: 5.7 % (ref 4–6)
HCT VFR BLD CALC: 35.9 % (ref 37–47)
HEMOGLOBIN: 10.8 G/DL (ref 12–16)
IMMATURE GRANULOCYTES #: 0 K/UL
INR BLD: 0.94 (ref 0.88–1.18)
LYMPHOCYTES ABSOLUTE: 1.4 K/UL (ref 1.1–4.5)
LYMPHOCYTES RELATIVE PERCENT: 17.9 % (ref 20–40)
MCH RBC QN AUTO: 27.4 PG (ref 27–31)
MCHC RBC AUTO-ENTMCNC: 30.1 G/DL (ref 33–37)
MCV RBC AUTO: 91.1 FL (ref 81–99)
MONOCYTES ABSOLUTE: 0.8 K/UL (ref 0–0.9)
MONOCYTES RELATIVE PERCENT: 10.8 % (ref 0–10)
MRSA SCREEN RT-PCR: NOT DETECTED
NEUTROPHILS ABSOLUTE: 4.9 K/UL (ref 1.5–7.5)
NEUTROPHILS RELATIVE PERCENT: 65 % (ref 50–65)
PDW BLD-RTO: 13.3 % (ref 11.5–14.5)
PLATELET # BLD: 292 K/UL (ref 130–400)
PMV BLD AUTO: 9.4 FL (ref 9.4–12.3)
POTASSIUM SERPL-SCNC: 4.4 MMOL/L (ref 3.5–5)
PROTHROMBIN TIME: 12.5 SEC (ref 12–14.6)
RBC # BLD: 3.94 M/UL (ref 4.2–5.4)
SODIUM BLD-SCNC: 140 MMOL/L (ref 136–145)
WBC # BLD: 7.5 K/UL (ref 4.8–10.8)

## 2022-07-11 PROCEDURE — 93010 ELECTROCARDIOGRAM REPORT: CPT | Performed by: INTERNAL MEDICINE

## 2022-07-11 PROCEDURE — 85730 THROMBOPLASTIN TIME PARTIAL: CPT

## 2022-07-11 PROCEDURE — 86900 BLOOD TYPING SEROLOGIC ABO: CPT

## 2022-07-11 PROCEDURE — 80048 BASIC METABOLIC PNL TOTAL CA: CPT

## 2022-07-11 PROCEDURE — 85025 COMPLETE CBC W/AUTO DIFF WBC: CPT

## 2022-07-11 PROCEDURE — 86901 BLOOD TYPING SEROLOGIC RH(D): CPT

## 2022-07-11 PROCEDURE — 86850 RBC ANTIBODY SCREEN: CPT

## 2022-07-11 PROCEDURE — 85610 PROTHROMBIN TIME: CPT

## 2022-07-11 PROCEDURE — 93005 ELECTROCARDIOGRAM TRACING: CPT | Performed by: ORTHOPAEDIC SURGERY

## 2022-07-11 PROCEDURE — 87641 MR-STAPH DNA AMP PROBE: CPT

## 2022-07-11 PROCEDURE — 83036 HEMOGLOBIN GLYCOSYLATED A1C: CPT

## 2022-07-18 ENCOUNTER — HOSPITAL ENCOUNTER (OUTPATIENT)
Dept: PREADMISSION TESTING | Age: 72
Discharge: HOME | DRG: 468 | End: 2022-07-22
Payer: MEDICARE

## 2022-07-18 LAB — SARS-COV-2, PCR: NOT DETECTED

## 2022-07-18 PROCEDURE — U0003 INFECTIOUS AGENT DETECTION BY NUCLEIC ACID (DNA OR RNA); SEVERE ACUTE RESPIRATORY SYNDROME CORONAVIRUS 2 (SARS-COV-2) (CORONAVIRUS DISEASE [COVID-19]), AMPLIFIED PROBE TECHNIQUE, MAKING USE OF HIGH THROUGHPUT TECHNOLOGIES AS DESCRIBED BY CMS-2020-01-R: HCPCS

## 2022-07-18 PROCEDURE — U0005 INFEC AGEN DETEC AMPLI PROBE: HCPCS

## 2022-07-21 ENCOUNTER — HOSPITAL ENCOUNTER (INPATIENT)
Age: 72
LOS: 4 days | Discharge: HOME HEALTH | DRG: 468 | End: 2022-07-25
Attending: ORTHOPAEDIC SURGERY | Admitting: ORTHOPAEDIC SURGERY
Payer: MEDICARE

## 2022-07-21 ENCOUNTER — ANESTHESIA (OUTPATIENT)
Dept: OPERATING ROOM | Age: 72
DRG: 468 | End: 2022-07-21
Payer: MEDICARE

## 2022-07-21 ENCOUNTER — ANESTHESIA EVENT (OUTPATIENT)
Dept: OPERATING ROOM | Age: 72
DRG: 468 | End: 2022-07-21
Payer: MEDICARE

## 2022-07-21 ENCOUNTER — APPOINTMENT (OUTPATIENT)
Dept: GENERAL RADIOLOGY | Age: 72
DRG: 468 | End: 2022-07-21
Attending: ORTHOPAEDIC SURGERY
Payer: MEDICARE

## 2022-07-21 DIAGNOSIS — T84.012A FAILED TOTAL KNEE, RIGHT, INITIAL ENCOUNTER (HCC): Primary | ICD-10-CM

## 2022-07-21 DIAGNOSIS — T84.53XA INFECTION OF TOTAL RIGHT KNEE REPLACEMENT, INITIAL ENCOUNTER (HCC): ICD-10-CM

## 2022-07-21 LAB
ABO/RH: NORMAL
ANTIBODY SCREEN: NORMAL
GLUCOSE BLD-MCNC: 96 MG/DL (ref 70–99)
PERFORMED ON: NORMAL

## 2022-07-21 PROCEDURE — 1210000000 HC MED SURG R&B

## 2022-07-21 PROCEDURE — 6360000002 HC RX W HCPCS: Performed by: ORTHOPAEDIC SURGERY

## 2022-07-21 PROCEDURE — 6360000002 HC RX W HCPCS: Performed by: ANESTHESIOLOGY

## 2022-07-21 PROCEDURE — 3600000015 HC SURGERY LEVEL 5 ADDTL 15MIN: Performed by: ORTHOPAEDIC SURGERY

## 2022-07-21 PROCEDURE — 6370000000 HC RX 637 (ALT 250 FOR IP): Performed by: ANESTHESIOLOGY

## 2022-07-21 PROCEDURE — 73560 X-RAY EXAM OF KNEE 1 OR 2: CPT | Performed by: RADIOLOGY

## 2022-07-21 PROCEDURE — 3600000005 HC SURGERY LEVEL 5 BASE: Performed by: ORTHOPAEDIC SURGERY

## 2022-07-21 PROCEDURE — 0SPC0JZ REMOVAL OF SYNTHETIC SUBSTITUTE FROM RIGHT KNEE JOINT, OPEN APPROACH: ICD-10-PCS | Performed by: ORTHOPAEDIC SURGERY

## 2022-07-21 PROCEDURE — 87075 CULTR BACTERIA EXCEPT BLOOD: CPT

## 2022-07-21 PROCEDURE — 2580000003 HC RX 258: Performed by: ORTHOPAEDIC SURGERY

## 2022-07-21 PROCEDURE — 87176 TISSUE HOMOGENIZATION CULTR: CPT

## 2022-07-21 PROCEDURE — 73560 X-RAY EXAM OF KNEE 1 OR 2: CPT

## 2022-07-21 PROCEDURE — 3700000000 HC ANESTHESIA ATTENDED CARE: Performed by: ORTHOPAEDIC SURGERY

## 2022-07-21 PROCEDURE — 87102 FUNGUS ISOLATION CULTURE: CPT

## 2022-07-21 PROCEDURE — 6360000002 HC RX W HCPCS

## 2022-07-21 PROCEDURE — C1713 ANCHOR/SCREW BN/BN,TIS/BN: HCPCS | Performed by: ORTHOPAEDIC SURGERY

## 2022-07-21 PROCEDURE — A4217 STERILE WATER/SALINE, 500 ML: HCPCS | Performed by: ORTHOPAEDIC SURGERY

## 2022-07-21 PROCEDURE — 0SRC0EZ REPLACEMENT OF RIGHT KNEE JOINT WITH ARTICULATING SPACER, OPEN APPROACH: ICD-10-PCS | Performed by: ORTHOPAEDIC SURGERY

## 2022-07-21 PROCEDURE — 2580000003 HC RX 258

## 2022-07-21 PROCEDURE — 36415 COLL VENOUS BLD VENIPUNCTURE: CPT

## 2022-07-21 PROCEDURE — 2500000003 HC RX 250 WO HCPCS

## 2022-07-21 PROCEDURE — 3700000001 HC ADD 15 MINUTES (ANESTHESIA): Performed by: ORTHOPAEDIC SURGERY

## 2022-07-21 PROCEDURE — 82947 ASSAY GLUCOSE BLOOD QUANT: CPT

## 2022-07-21 PROCEDURE — 86901 BLOOD TYPING SEROLOGIC RH(D): CPT

## 2022-07-21 PROCEDURE — 87205 SMEAR GRAM STAIN: CPT

## 2022-07-21 PROCEDURE — 64447 NJX AA&/STRD FEMORAL NRV IMG: CPT

## 2022-07-21 PROCEDURE — 86850 RBC ANTIBODY SCREEN: CPT

## 2022-07-21 PROCEDURE — 6370000000 HC RX 637 (ALT 250 FOR IP): Performed by: ORTHOPAEDIC SURGERY

## 2022-07-21 PROCEDURE — 86900 BLOOD TYPING SEROLOGIC ABO: CPT

## 2022-07-21 PROCEDURE — 7100000000 HC PACU RECOVERY - FIRST 15 MIN: Performed by: ORTHOPAEDIC SURGERY

## 2022-07-21 PROCEDURE — 2500000003 HC RX 250 WO HCPCS: Performed by: ORTHOPAEDIC SURGERY

## 2022-07-21 PROCEDURE — 7100000001 HC PACU RECOVERY - ADDTL 15 MIN: Performed by: ORTHOPAEDIC SURGERY

## 2022-07-21 PROCEDURE — C1776 JOINT DEVICE (IMPLANTABLE): HCPCS | Performed by: ORTHOPAEDIC SURGERY

## 2022-07-21 PROCEDURE — 87070 CULTURE OTHR SPECIMN AEROBIC: CPT

## 2022-07-21 PROCEDURE — 2709999900 HC NON-CHARGEABLE SUPPLY: Performed by: ORTHOPAEDIC SURGERY

## 2022-07-21 DEVICE — CEMENT BNE 40GM HI VISC RADPQ FOR REV SURG: Type: IMPLANTABLE DEVICE | Site: KNEE | Status: FUNCTIONAL

## 2022-07-21 DEVICE — IMPLANTABLE DEVICE: Type: IMPLANTABLE DEVICE | Site: KNEE | Status: FUNCTIONAL

## 2022-07-21 DEVICE — COMPONENT FEM SZ 2.5 AP59MM ML63MM R KNEE CO CHROM CRUCE: Type: IMPLANTABLE DEVICE | Site: KNEE | Status: FUNCTIONAL

## 2022-07-21 DEVICE — CEMENT BONE 40 GM W/ GENTMYCN HI VISC PALACOS R+G: Type: IMPLANTABLE DEVICE | Site: KNEE | Status: FUNCTIONAL

## 2022-07-21 RX ORDER — VANCOMYCIN HYDROCHLORIDE 1 G/20ML
INJECTION, POWDER, LYOPHILIZED, FOR SOLUTION INTRAVENOUS PRN
Status: DISCONTINUED | OUTPATIENT
Start: 2022-07-21 | End: 2022-07-21 | Stop reason: ALTCHOICE

## 2022-07-21 RX ORDER — SODIUM CHLORIDE 0.9 % (FLUSH) 0.9 %
5-40 SYRINGE (ML) INJECTION PRN
Status: DISCONTINUED | OUTPATIENT
Start: 2022-07-21 | End: 2022-07-21 | Stop reason: HOSPADM

## 2022-07-21 RX ORDER — DIPHENHYDRAMINE HYDROCHLORIDE 50 MG/ML
12.5 INJECTION INTRAMUSCULAR; INTRAVENOUS
Status: DISCONTINUED | OUTPATIENT
Start: 2022-07-21 | End: 2022-07-21 | Stop reason: HOSPADM

## 2022-07-21 RX ORDER — SODIUM CHLORIDE 0.9 % (FLUSH) 0.9 %
5-40 SYRINGE (ML) INJECTION PRN
Status: DISCONTINUED | OUTPATIENT
Start: 2022-07-21 | End: 2022-07-25 | Stop reason: HOSPADM

## 2022-07-21 RX ORDER — METOCLOPRAMIDE HYDROCHLORIDE 5 MG/ML
10 INJECTION INTRAMUSCULAR; INTRAVENOUS
Status: DISCONTINUED | OUTPATIENT
Start: 2022-07-21 | End: 2022-07-21 | Stop reason: HOSPADM

## 2022-07-21 RX ORDER — SODIUM CHLORIDE 0.9 % (FLUSH) 0.9 %
10 SYRINGE (ML) INJECTION EVERY 12 HOURS SCHEDULED
Status: DISCONTINUED | OUTPATIENT
Start: 2022-07-21 | End: 2022-07-21

## 2022-07-21 RX ORDER — MORPHINE SULFATE 4 MG/ML
4 INJECTION, SOLUTION INTRAMUSCULAR; INTRAVENOUS
Status: DISCONTINUED | OUTPATIENT
Start: 2022-07-21 | End: 2022-07-25 | Stop reason: HOSPADM

## 2022-07-21 RX ORDER — MIDAZOLAM HYDROCHLORIDE 1 MG/ML
2 INJECTION INTRAMUSCULAR; INTRAVENOUS
Status: COMPLETED | OUTPATIENT
Start: 2022-07-21 | End: 2022-07-21

## 2022-07-21 RX ORDER — ROPIVACAINE HYDROCHLORIDE 5 MG/ML
INJECTION, SOLUTION EPIDURAL; INFILTRATION; PERINEURAL
Status: COMPLETED
Start: 2022-07-21 | End: 2022-07-21

## 2022-07-21 RX ORDER — SODIUM CHLORIDE 9 MG/ML
INJECTION, SOLUTION INTRAVENOUS PRN
Status: DISCONTINUED | OUTPATIENT
Start: 2022-07-21 | End: 2022-07-25 | Stop reason: HOSPADM

## 2022-07-21 RX ORDER — SODIUM CHLORIDE 9 MG/ML
25 INJECTION, SOLUTION INTRAVENOUS PRN
Status: DISCONTINUED | OUTPATIENT
Start: 2022-07-21 | End: 2022-07-21

## 2022-07-21 RX ORDER — ROPIVACAINE HYDROCHLORIDE 2 MG/ML
INJECTION, SOLUTION EPIDURAL; INFILTRATION; PERINEURAL PRN
Status: DISCONTINUED | OUTPATIENT
Start: 2022-07-21 | End: 2022-07-21 | Stop reason: ALTCHOICE

## 2022-07-21 RX ORDER — CEFAZOLIN SODIUM 1 G/3ML
INJECTION, POWDER, FOR SOLUTION INTRAMUSCULAR; INTRAVENOUS PRN
Status: DISCONTINUED | OUTPATIENT
Start: 2022-07-21 | End: 2022-07-21 | Stop reason: ALTCHOICE

## 2022-07-21 RX ORDER — DEXAMETHASONE SODIUM PHOSPHATE 10 MG/ML
8 INJECTION, SOLUTION INTRAMUSCULAR; INTRAVENOUS ONCE
Status: DISCONTINUED | OUTPATIENT
Start: 2022-07-21 | End: 2022-07-21 | Stop reason: HOSPADM

## 2022-07-21 RX ORDER — CEFAZOLIN SODIUM 1 G/3ML
INJECTION, POWDER, FOR SOLUTION INTRAMUSCULAR; INTRAVENOUS PRN
Status: DISCONTINUED | OUTPATIENT
Start: 2022-07-21 | End: 2022-07-21 | Stop reason: SDUPTHER

## 2022-07-21 RX ORDER — ACETAMINOPHEN 325 MG/1
650 TABLET ORAL EVERY 6 HOURS
Status: DISCONTINUED | OUTPATIENT
Start: 2022-07-21 | End: 2022-07-25 | Stop reason: HOSPADM

## 2022-07-21 RX ORDER — OXYCODONE HYDROCHLORIDE 5 MG/1
5 TABLET ORAL EVERY 4 HOURS PRN
Status: DISCONTINUED | OUTPATIENT
Start: 2022-07-21 | End: 2022-07-25 | Stop reason: HOSPADM

## 2022-07-21 RX ORDER — ROCURONIUM BROMIDE 10 MG/ML
INJECTION, SOLUTION INTRAVENOUS PRN
Status: DISCONTINUED | OUTPATIENT
Start: 2022-07-21 | End: 2022-07-21 | Stop reason: SDUPTHER

## 2022-07-21 RX ORDER — MORPHINE SULFATE 2 MG/ML
2 INJECTION, SOLUTION INTRAMUSCULAR; INTRAVENOUS
Status: DISCONTINUED | OUTPATIENT
Start: 2022-07-21 | End: 2022-07-25 | Stop reason: HOSPADM

## 2022-07-21 RX ORDER — SODIUM CHLORIDE, SODIUM LACTATE, POTASSIUM CHLORIDE, CALCIUM CHLORIDE 600; 310; 30; 20 MG/100ML; MG/100ML; MG/100ML; MG/100ML
INJECTION, SOLUTION INTRAVENOUS CONTINUOUS
Status: DISCONTINUED | OUTPATIENT
Start: 2022-07-21 | End: 2022-07-21 | Stop reason: HOSPADM

## 2022-07-21 RX ORDER — PROPOFOL 10 MG/ML
INJECTION, EMULSION INTRAVENOUS PRN
Status: DISCONTINUED | OUTPATIENT
Start: 2022-07-21 | End: 2022-07-21 | Stop reason: SDUPTHER

## 2022-07-21 RX ORDER — SODIUM CHLORIDE 0.9 % (FLUSH) 0.9 %
10 SYRINGE (ML) INJECTION PRN
Status: DISCONTINUED | OUTPATIENT
Start: 2022-07-21 | End: 2022-07-21

## 2022-07-21 RX ORDER — DEXMEDETOMIDINE HYDROCHLORIDE 100 UG/ML
INJECTION, SOLUTION INTRAVENOUS PRN
Status: DISCONTINUED | OUTPATIENT
Start: 2022-07-21 | End: 2022-07-21 | Stop reason: SDUPTHER

## 2022-07-21 RX ORDER — MELOXICAM 7.5 MG/1
3.75 TABLET ORAL DAILY
Status: COMPLETED | OUTPATIENT
Start: 2022-07-21 | End: 2022-07-23

## 2022-07-21 RX ORDER — ACETAMINOPHEN 500 MG
1000 TABLET ORAL ONCE
Status: COMPLETED | OUTPATIENT
Start: 2022-07-21 | End: 2022-07-21

## 2022-07-21 RX ORDER — MEPERIDINE HYDROCHLORIDE 25 MG/ML
12.5 INJECTION INTRAMUSCULAR; INTRAVENOUS; SUBCUTANEOUS EVERY 5 MIN PRN
Status: DISCONTINUED | OUTPATIENT
Start: 2022-07-21 | End: 2022-07-21 | Stop reason: HOSPADM

## 2022-07-21 RX ORDER — LIDOCAINE HYDROCHLORIDE 10 MG/ML
1 INJECTION, SOLUTION EPIDURAL; INFILTRATION; INTRACAUDAL; PERINEURAL
Status: DISCONTINUED | OUTPATIENT
Start: 2022-07-21 | End: 2022-07-21 | Stop reason: HOSPADM

## 2022-07-21 RX ORDER — OXYCODONE HYDROCHLORIDE 5 MG/1
10 TABLET ORAL EVERY 4 HOURS PRN
Status: DISCONTINUED | OUTPATIENT
Start: 2022-07-21 | End: 2022-07-25 | Stop reason: HOSPADM

## 2022-07-21 RX ORDER — BETAMETHASONE SODIUM PHOSPHATE AND BETAMETHASONE ACETATE 3; 3 MG/ML; MG/ML
INJECTION, SUSPENSION INTRA-ARTICULAR; INTRALESIONAL; INTRAMUSCULAR; SOFT TISSUE PRN
Status: DISCONTINUED | OUTPATIENT
Start: 2022-07-21 | End: 2022-07-21 | Stop reason: ALTCHOICE

## 2022-07-21 RX ORDER — SODIUM CHLORIDE 0.9 % (FLUSH) 0.9 %
5-40 SYRINGE (ML) INJECTION EVERY 12 HOURS SCHEDULED
Status: DISCONTINUED | OUTPATIENT
Start: 2022-07-21 | End: 2022-07-21 | Stop reason: HOSPADM

## 2022-07-21 RX ORDER — SODIUM CHLORIDE 0.9 % (FLUSH) 0.9 %
5-40 SYRINGE (ML) INJECTION EVERY 12 HOURS SCHEDULED
Status: DISCONTINUED | OUTPATIENT
Start: 2022-07-21 | End: 2022-07-25 | Stop reason: HOSPADM

## 2022-07-21 RX ORDER — MORPHINE SULFATE 2 MG/ML
2 INJECTION, SOLUTION INTRAMUSCULAR; INTRAVENOUS EVERY 5 MIN PRN
Status: DISCONTINUED | OUTPATIENT
Start: 2022-07-21 | End: 2022-07-21 | Stop reason: HOSPADM

## 2022-07-21 RX ORDER — FENTANYL CITRATE 50 UG/ML
INJECTION, SOLUTION INTRAMUSCULAR; INTRAVENOUS PRN
Status: DISCONTINUED | OUTPATIENT
Start: 2022-07-21 | End: 2022-07-21 | Stop reason: SDUPTHER

## 2022-07-21 RX ORDER — MORPHINE SULFATE 4 MG/ML
4 INJECTION, SOLUTION INTRAMUSCULAR; INTRAVENOUS EVERY 5 MIN PRN
Status: DISCONTINUED | OUTPATIENT
Start: 2022-07-21 | End: 2022-07-21 | Stop reason: HOSPADM

## 2022-07-21 RX ORDER — FAMOTIDINE 20 MG/1
20 TABLET, FILM COATED ORAL ONCE
Status: COMPLETED | OUTPATIENT
Start: 2022-07-21 | End: 2022-07-21

## 2022-07-21 RX ORDER — TRANEXAMIC ACID 650 1/1
1950 TABLET ORAL
Status: COMPLETED | OUTPATIENT
Start: 2022-07-21 | End: 2022-07-21

## 2022-07-21 RX ORDER — ONDANSETRON 2 MG/ML
INJECTION INTRAMUSCULAR; INTRAVENOUS PRN
Status: DISCONTINUED | OUTPATIENT
Start: 2022-07-21 | End: 2022-07-21 | Stop reason: SDUPTHER

## 2022-07-21 RX ORDER — SCOLOPAMINE TRANSDERMAL SYSTEM 1 MG/1
1 PATCH, EXTENDED RELEASE TRANSDERMAL
Status: DISCONTINUED | OUTPATIENT
Start: 2022-07-21 | End: 2022-07-21

## 2022-07-21 RX ORDER — SODIUM CHLORIDE, SODIUM LACTATE, POTASSIUM CHLORIDE, CALCIUM CHLORIDE 600; 310; 30; 20 MG/100ML; MG/100ML; MG/100ML; MG/100ML
INJECTION, SOLUTION INTRAVENOUS CONTINUOUS PRN
Status: DISCONTINUED | OUTPATIENT
Start: 2022-07-21 | End: 2022-07-21 | Stop reason: SDUPTHER

## 2022-07-21 RX ORDER — BUPIVACAINE HYDROCHLORIDE 5 MG/ML
INJECTION, SOLUTION PERINEURAL PRN
Status: DISCONTINUED | OUTPATIENT
Start: 2022-07-21 | End: 2022-07-21 | Stop reason: ALTCHOICE

## 2022-07-21 RX ORDER — ROPIVACAINE HYDROCHLORIDE 5 MG/ML
INJECTION, SOLUTION EPIDURAL; INFILTRATION; PERINEURAL
Status: COMPLETED | OUTPATIENT
Start: 2022-07-21 | End: 2022-07-21

## 2022-07-21 RX ORDER — LIDOCAINE HYDROCHLORIDE 10 MG/ML
5 INJECTION, SOLUTION EPIDURAL; INFILTRATION; INTRACAUDAL; PERINEURAL ONCE
Status: DISCONTINUED | OUTPATIENT
Start: 2022-07-21 | End: 2022-07-25 | Stop reason: HOSPADM

## 2022-07-21 RX ORDER — LIDOCAINE HYDROCHLORIDE 10 MG/ML
INJECTION, SOLUTION EPIDURAL; INFILTRATION; INTRACAUDAL; PERINEURAL PRN
Status: DISCONTINUED | OUTPATIENT
Start: 2022-07-21 | End: 2022-07-21 | Stop reason: SDUPTHER

## 2022-07-21 RX ORDER — SODIUM CHLORIDE 9 MG/ML
INJECTION, SOLUTION INTRAVENOUS PRN
Status: DISCONTINUED | OUTPATIENT
Start: 2022-07-21 | End: 2022-07-21 | Stop reason: HOSPADM

## 2022-07-21 RX ORDER — CELECOXIB 100 MG/1
100 CAPSULE ORAL ONCE
Status: DISCONTINUED | OUTPATIENT
Start: 2022-07-21 | End: 2022-07-21 | Stop reason: HOSPADM

## 2022-07-21 RX ADMIN — SUGAMMADEX 300 MG: 100 INJECTION, SOLUTION INTRAVENOUS at 17:12

## 2022-07-21 RX ADMIN — SODIUM CHLORIDE, PRESERVATIVE FREE 10 ML: 5 INJECTION INTRAVENOUS at 22:29

## 2022-07-21 RX ADMIN — FENTANYL CITRATE 50 MCG: 50 INJECTION, SOLUTION INTRAMUSCULAR; INTRAVENOUS at 16:26

## 2022-07-21 RX ADMIN — PHENYLEPHRINE HYDROCHLORIDE 100 MCG: 10 INJECTION INTRAVENOUS at 15:02

## 2022-07-21 RX ADMIN — FAMOTIDINE 20 MG: 20 TABLET, FILM COATED ORAL at 13:16

## 2022-07-21 RX ADMIN — VANCOMYCIN HYDROCHLORIDE 1500 MG: 10 INJECTION, POWDER, LYOPHILIZED, FOR SOLUTION INTRAVENOUS at 15:50

## 2022-07-21 RX ADMIN — PHENYLEPHRINE HYDROCHLORIDE 50 MCG: 10 INJECTION INTRAVENOUS at 14:51

## 2022-07-21 RX ADMIN — TRANEXAMIC ACID 1950 MG: 650 TABLET ORAL at 13:16

## 2022-07-21 RX ADMIN — MORPHINE SULFATE 2 MG: 2 INJECTION, SOLUTION INTRAMUSCULAR; INTRAVENOUS at 17:42

## 2022-07-21 RX ADMIN — CEFAZOLIN SODIUM 2000 MG: 1 INJECTION, POWDER, FOR SOLUTION INTRAMUSCULAR; INTRAVENOUS at 15:43

## 2022-07-21 RX ADMIN — ROPIVACAINE HYDROCHLORIDE 20 ML: 5 INJECTION, SOLUTION EPIDURAL; INFILTRATION; PERINEURAL at 14:29

## 2022-07-21 RX ADMIN — FENTANYL CITRATE 100 MCG: 50 INJECTION, SOLUTION INTRAMUSCULAR; INTRAVENOUS at 14:38

## 2022-07-21 RX ADMIN — ROCURONIUM BROMIDE 50 MG: 10 INJECTION, SOLUTION INTRAVENOUS at 14:39

## 2022-07-21 RX ADMIN — LIDOCAINE HYDROCHLORIDE 50 MG: 10 INJECTION, SOLUTION EPIDURAL; INFILTRATION; INTRACAUDAL; PERINEURAL at 14:38

## 2022-07-21 RX ADMIN — HYDROMORPHONE HYDROCHLORIDE 1 MG: 1 INJECTION, SOLUTION INTRAMUSCULAR; INTRAVENOUS; SUBCUTANEOUS at 17:18

## 2022-07-21 RX ADMIN — ASPIRIN 325 MG: 325 TABLET, COATED ORAL at 22:29

## 2022-07-21 RX ADMIN — MIDAZOLAM HYDROCHLORIDE 1 MG: 1 INJECTION, SOLUTION INTRAMUSCULAR; INTRAVENOUS at 14:25

## 2022-07-21 RX ADMIN — DEXMEDETOMIDINE HYDROCHLORIDE 20 MCG: 100 INJECTION, SOLUTION, CONCENTRATE INTRAVENOUS at 14:29

## 2022-07-21 RX ADMIN — MORPHINE SULFATE 4 MG: 4 INJECTION, SOLUTION INTRAMUSCULAR; INTRAVENOUS at 17:51

## 2022-07-21 RX ADMIN — PROPOFOL 160 MG: 10 INJECTION, EMULSION INTRAVENOUS at 14:38

## 2022-07-21 RX ADMIN — SODIUM CHLORIDE, SODIUM LACTATE, POTASSIUM CHLORIDE, AND CALCIUM CHLORIDE: 600; 310; 30; 20 INJECTION, SOLUTION INTRAVENOUS at 15:35

## 2022-07-21 RX ADMIN — ROCURONIUM BROMIDE 20 MG: 10 INJECTION, SOLUTION INTRAVENOUS at 15:24

## 2022-07-21 RX ADMIN — FENTANYL CITRATE 25 MCG: 50 INJECTION, SOLUTION INTRAMUSCULAR; INTRAVENOUS at 15:05

## 2022-07-21 RX ADMIN — SODIUM CHLORIDE, SODIUM LACTATE, POTASSIUM CHLORIDE, AND CALCIUM CHLORIDE: 600; 310; 30; 20 INJECTION, SOLUTION INTRAVENOUS at 14:32

## 2022-07-21 RX ADMIN — PHENYLEPHRINE HYDROCHLORIDE 50 MCG: 10 INJECTION INTRAVENOUS at 14:55

## 2022-07-21 RX ADMIN — FENTANYL CITRATE 50 MCG: 50 INJECTION, SOLUTION INTRAMUSCULAR; INTRAVENOUS at 15:37

## 2022-07-21 RX ADMIN — WATER 2000 MG: 1 INJECTION INTRAMUSCULAR; INTRAVENOUS; SUBCUTANEOUS at 22:28

## 2022-07-21 RX ADMIN — PHENYLEPHRINE HYDROCHLORIDE 50 MCG: 10 INJECTION INTRAVENOUS at 14:47

## 2022-07-21 RX ADMIN — MORPHINE SULFATE 2 MG: 2 INJECTION, SOLUTION INTRAMUSCULAR; INTRAVENOUS at 17:36

## 2022-07-21 RX ADMIN — MELOXICAM 3.75 MG: 7.5 TABLET ORAL at 22:29

## 2022-07-21 RX ADMIN — ACETAMINOPHEN 1000 MG: 500 TABLET ORAL at 13:16

## 2022-07-21 RX ADMIN — ACETAMINOPHEN 325MG 650 MG: 325 TABLET ORAL at 22:29

## 2022-07-21 RX ADMIN — MORPHINE SULFATE 2 MG: 2 INJECTION, SOLUTION INTRAMUSCULAR; INTRAVENOUS at 18:00

## 2022-07-21 RX ADMIN — PHENYLEPHRINE HYDROCHLORIDE 50 MCG: 10 INJECTION INTRAVENOUS at 14:49

## 2022-07-21 RX ADMIN — ONDANSETRON 4 MG: 2 INJECTION INTRAMUSCULAR; INTRAVENOUS at 14:46

## 2022-07-21 RX ADMIN — SODIUM CHLORIDE, POTASSIUM CHLORIDE, SODIUM LACTATE AND CALCIUM CHLORIDE: 600; 310; 30; 20 INJECTION, SOLUTION INTRAVENOUS at 13:18

## 2022-07-21 RX ADMIN — FENTANYL CITRATE 25 MCG: 50 INJECTION, SOLUTION INTRAMUSCULAR; INTRAVENOUS at 15:20

## 2022-07-21 RX ADMIN — OXYCODONE 10 MG: 5 TABLET ORAL at 22:28

## 2022-07-21 RX ADMIN — PHENYLEPHRINE HYDROCHLORIDE 50 MCG: 10 INJECTION INTRAVENOUS at 14:48

## 2022-07-21 ASSESSMENT — PAIN SCALES - GENERAL
PAINLEVEL_OUTOF10: 7
PAINLEVEL_OUTOF10: 0
PAINLEVEL_OUTOF10: 0
PAINLEVEL_OUTOF10: 5
PAINLEVEL_OUTOF10: 7
PAINLEVEL_OUTOF10: 4
PAINLEVEL_OUTOF10: 0
PAINLEVEL_OUTOF10: 4

## 2022-07-21 ASSESSMENT — LIFESTYLE VARIABLES: SMOKING_STATUS: 0

## 2022-07-21 ASSESSMENT — PAIN DESCRIPTION - DESCRIPTORS: DESCRIPTORS: ACHING;DISCOMFORT;GNAWING

## 2022-07-21 ASSESSMENT — PAIN - FUNCTIONAL ASSESSMENT
PAIN_FUNCTIONAL_ASSESSMENT: PREVENTS OR INTERFERES SOME ACTIVE ACTIVITIES AND ADLS
PAIN_FUNCTIONAL_ASSESSMENT: NONE - DENIES PAIN

## 2022-07-21 ASSESSMENT — PAIN DESCRIPTION - ORIENTATION
ORIENTATION: RIGHT

## 2022-07-21 ASSESSMENT — PAIN DESCRIPTION - LOCATION
LOCATION: KNEE

## 2022-07-21 ASSESSMENT — ENCOUNTER SYMPTOMS: SHORTNESS OF BREATH: 0

## 2022-07-21 NOTE — ANESTHESIA POSTPROCEDURE EVALUATION
Department of Anesthesiology  Postprocedure Note    Patient: Ralph Prather  MRN: [id number]  YOB: 1950  Date of evaluation: 7/21/2022      Procedure Summary     Date: 07/21/22 Room / Location: [facility] OR [address] [place] [address]    Anesthesia Start: 1432 Anesthesia Stop:     Procedure: RIGHT KNEE EXPLANT & PLACEMENT OF ANTIBIOTIC SPACER (Right: Knee) Diagnosis:       [facility]      (Infection of total right knee replacement, initial encounter [facility]) [D11.25AE])    Surgeons: Gideon Cervantes MD Responsible Provider: Peggi Hatchet, APRN - CRNA    Anesthesia Type: general, regional ASA Status: 3          Anesthesia Type: No value filed.     Karli Phase I: Karli Score: 9    Karli Phase II:        Anesthesia Post Evaluation    Patient location during evaluation: PACU  Patient participation: complete - patient participated  Level of consciousness: sleepy but conscious  Pain score: 0  Airway patency: patent  Nausea & Vomiting: no nausea and no vomiting  Complications: no  Cardiovascular status: hemodynamically stable  Respiratory status: room air and spontaneous ventilation  Hydration status: euvolemic  Multimodal analgesia pain management approach

## 2022-07-21 NOTE — ANESTHESIA PRE PROCEDURE
tablet Take 500 mg by mouth every morning (before breakfast) 11/11/19   Historical Provider, MD   rOPINIRole (REQUIP) 1 MG tablet Take 1 mg by mouth nightly as needed 12/28/19   Historical Provider, MD   tolterodine (DETROL LA) 4 MG extended release capsule Take 4 mg by mouth nightly 10/22/19   Historical Provider, MD   levocetirizine (XYZAL) 5 MG tablet Take 5 mg by mouth daily as needed    Historical Provider, MD   aspirin 81 MG tablet Take 81 mg by mouth daily 1/24/17   Historical Provider, MD   Coenzyme Q10 10 MG CAPS Take 200 mg by mouth daily    Historical Provider, MD   Cholecalciferol (VITAMIN D3) 50 MCG (2000 UT) CAPS Take 2,000 Units by mouth Daily    Historical Provider, MD       Current medications:    Current Facility-Administered Medications   Medication Dose Route Frequency Provider Last Rate Last Admin    acetaminophen (TYLENOL) tablet 1,000 mg  1,000 mg Oral Once Jj Dow MD        celecoxib (CELEBREX) capsule 100 mg  100 mg Oral Once Jj Dow MD        dexamethasone (PF) (DECADRON) injection 8 mg  8 mg IntraVENous Once Jj Dow MD        tranexamic acid (LYSTEDA) tablet 1,950 mg  1,950 mg Oral On Call to OR Jj Dow MD        lactated ringers infusion   IntraVENous Continuous Jj Dow MD        sodium chloride flush 0.9 % injection 5-40 mL  5-40 mL IntraVENous 2 times per day Jj Dow MD        sodium chloride flush 0.9 % injection 5-40 mL  5-40 mL IntraVENous PRN Jj Dow MD        0.9 % sodium chloride infusion   IntraVENous PRN Jj Dow MD        ceFAZolin (ANCEF) 2,000 mg in sterile water 20 mL IV syringe  2,000 mg IntraVENous On Call to [address], [place]           Allergies:  No Known Allergies    Problem List:    Patient Active Problem List   Diagnosis Code    Numbness and tingling in both hands R20.0, R20.2    Iron deficiency anemia secondary to blood loss (chronic) D50.0       Past Medical History:        Diagnosis Date    Carotid stenosis     Colonic polyp     DM (diabetes mellitus) (HCC)     Essential hypertension     Gastric ulcer     duodenal    GERD (gastroesophageal reflux disease)     Hiatal hernia     Hyperlipidemia     Iron deficiency anemia secondary to blood loss (chronic)     OA (osteoarthritis)     Status post dilatation of esophageal stricture        Past Surgical History:        Procedure Laterality Date    ANKLE SURGERY Left     ORIF    COLONOSCOPY  01/2018    Dr Aleyda Granados Right     UPPER GASTROINTESTINAL ENDOSCOPY  06/11/2018    Dr Donavan Brandon History:    Social History     Tobacco Use    Smoking status: Never    Smokeless tobacco: Never   Substance Use Topics    Alcohol use: Never                                Counseling given: Not Answered      Vital Signs (Current): There were no vitals filed for this visit.                                            BP Readings from Last 3 Encounters:   04/26/22 (!) 146/72   10/26/21 (!) 144/64   04/27/21 (!) 146/68       NPO Status:                                                                                 BMI:   Wt Readings from Last 3 Encounters:   07/11/22 195 lb (88.5 kg)   04/26/22 194 lb (88 kg)   10/26/21 193 lb (87.5 kg)     There is no height or weight on file to calculate BMI.    CBC:   Lab Results   Component Value Date/Time    WBC 7.5 07/11/2022 09:30 AM    RBC 3.94 07/11/2022 09:30 AM    HGB 10.8 07/11/2022 09:30 AM    HCT 35.9 07/11/2022 09:30 AM    MCV 91.1 07/11/2022 09:30 AM    RDW 13.3 07/11/2022 09:30 AM     07/11/2022 09:30 AM       CMP:   Lab Results   Component Value Date/Time     07/11/2022 09:30 AM    K 4.4 07/11/2022 09:30 AM     07/11/2022 09:30 AM    CO2 28 07/11/2022 09:30 AM    BUN 26 07/11/2022 09:30 AM    CREATININE 1.0 07/11/2022 09:30 AM    GFRAA >59 07/11/2022 09:30 AM    LABGLOM 55 07/11/2022 09:30 AM    GLUCOSE 87 07/11/2022 09:30 AM    CALCIUM 10.7 07/11/2022 09:30 AM       POC Tests: No results for input(s): POCGLU, POCNA, POCK, POCCL, POCBUN, POCHEMO, POCHCT in the last 72 hours. Coags:   Lab Results   Component Value Date/Time    PROTIME 12.5 07/11/2022 09:30 AM    INR 0.94 07/11/2022 09:30 AM    APTT 30.8 07/11/2022 09:30 AM       HCG (If Applicable): No results found for: PREGTESTUR, PREGSERUM, HCG, HCGQUANT     ABGs: No results found for: PHART, PO2ART, OFM8TNJ, OMH4ENP, BEART, U6LWLPEC     Type & Screen (If Applicable):  No results found for: LABABO, LABRH    Drug/Infectious Status (If Applicable):  No results found for: HIV, HEPCAB    COVID-19 Screening (If Applicable):   Lab Results   Component Value Date/Time    COVID19 Not Detected 07/18/2022 10:25 AM           Anesthesia Evaluation  Patient summary reviewed and Nursing notes reviewed no history of anesthetic complications:   Airway: Mallampati: II  TM distance: >3 FB   Neck ROM: full  Mouth opening: > = 3 FB   Dental: normal exam   (+) caps and bridge      Pulmonary:Negative Pulmonary ROS and normal exam  breath sounds clear to auscultation      (-) shortness of breath and not a current smoker          Patient did not smoke on day of surgery. Cardiovascular:    (+) hypertension:, hyperlipidemia    (-) CAD,  angina and  CHF    NYHA Classification: I  ECG reviewed  Rhythm: regular  Rate: normal           Beta Blocker:  Not on Beta Blocker         Neuro/Psych:   Negative Neuro/Psych ROS     (-) seizures, CVA and depression/anxiety            GI/Hepatic/Renal: Neg GI/Hepatic/Renal ROS  (+) hiatal hernia, GERD:, PUD,           Endo/Other: Negative Endo/Other ROS   (+) Diabetes, . Pt had PAT visit. Abdominal:       Abdomen: soft. Vascular:           Other Findings:           Anesthesia Plan      general and regional     ASA 3     (Adductor Canal block for postop pain management, risk/benefit discussed with patient and accepted. (risk includes but not limited to; bleeding, infection, risk of drug reaction, nerve damage, death ))  Induction: intravenous. BIS    Anesthetic plan and risks discussed with patient. Plan discussed with CRNA.                     Minor Yoo MD   7/21/2022

## 2022-07-21 NOTE — PROGRESS NOTES
[address] [facility] Pharmacokinetic Monitoring Service - Vancomycin     Anders Maloney is a 71 y.o. female starting on vancomycin therapy for bone and joint infection. Pharmacy consulted by Dr Chava Wei for monitoring and adjustment. Target Concentration: Goal AUC/GODWIN 400-600 mg*hr/L    Additional Antimicrobials: ancef    Pertinent Laboratory Values: Wt Readings from Last 1 Encounters:   07/21/22 190 lb (86.2 kg)     Temp Readings from Last 1 Encounters:   07/21/22 96.8 °F (36 °C) (Temporal)     Estimated Creatinine Clearance: 57 mL/min (A) (based on SCr of 1 mg/dL (H)). No results for input(s): CREATININE, WBC in the last 72 hours. Invalid input(s):  BUN  Procalcitonin:     Pertinent Cultures:  Culture Date Source Results   07/21/22 Surgical knee collected   MRSA Nasal Swab: N/A. Non-respiratory infection.     Plan:  Dosing recommendations based on Bayesian software  Start vancomycin 750 mg iv every 12 hours  Anticipated AUC of 494 and trough concentration of 16.9 at steady state  Renal labs as indicated   Vancomycin concentration ordered for 07/23/22 @ 0330   Pharmacy will continue to monitor patient and adjust therapy as indicated    Thank you for the consult,  TAMRA Sahni [facility]  7/21/2022 6:44 PM

## 2022-07-21 NOTE — ANESTHESIA PROCEDURE NOTES
Peripheral Block    Patient location during procedure: holding area  Reason for block: post-op pain management  Start time: 7/21/2022 2:29 PM  End time: 7/21/2022 2:29 PM  Staffing  Performed: anesthesiologist   Anesthesiologist: Matthew Pineda DO  Preanesthetic Checklist  Completed: patient identified, IV checked, site marked, risks and benefits discussed, surgical/procedural consents, equipment checked, pre-op evaluation, timeout performed, anesthesia consent given, oxygen available and monitors applied/VS acknowledged  Peripheral Block   Patient position: supine  Prep: ChloraPrep  Provider prep: mask and sterile gloves  Patient monitoring: cardiac monitor, continuous pulse ox, frequent blood pressure checks and IV access  Block type: Femoral  Adductor canal  Laterality: right  Injection technique: single-shot  Guidance: ultrasound guided  Local infiltration: ropivacaine  Local infiltration: ropivacaine    Needle   Needle type: short-bevel   Needle gauge: 20 G  Needle localization: ultrasound guidance  Needle length: 10 cm  Assessment   Injection assessment: negative aspiration for heme, no paresthesia on injection and local visualized surrounding nerve on ultrasound  Paresthesia pain: none  Slow fractionated injection: yes  Hemodynamics: stable  Real-time US image taken/store: yes  Outcomes: uncomplicated and patient tolerated procedure well    Additional Notes  Needle was introduced in proximal third of thigh in an  luis fernando-medial to posterior direction. The needle was visualized \" in- plane\" under ultrasound guidance to access the adductor canal in a sub-sartorial fashion. The femoral artery was avoided and 20 cc of 0.5% ropivacaine  was injected and surrounded the nerve plexus. The plexus appeared anatomically normal, no obvious pathology was noted.  A permanent image was obtained   Medications Administered  ropivacaine (NAROPIN) injection 0.5% - Perineural   20 mL - 7/21/2022 2:29:00 PM

## 2022-07-21 NOTE — OP NOTE
OPERATIVE NOTE    Patient:  Shereen Motley    Date:  7/21/2022    Medical Record Number:  [id number]    Primary Pre-Operative Diagnosis: Infected right total knee arthroplasty    Primary Post-Operative Diagnosis:  Same    Procedure: Removal of implants right total knee, irrigation debridement, and placement of articulating antibiotic spacer. A DePuy TC 3 size 2.5 mm posterior stabilized femoral component was mated with a 25 mm thick all polytibial bearing. Assistant: Debra Felix      Anesthesia: General    Estimated Blood Loss:  Minimal    Tourniquet Time: 90 minutes    Specimens: 5 culture sent    Complications:  None    Findings:  As above      Procedure:  Patient was brought into the operating room and general endotracheal anesthetic was placed. The extremity was prepped with chlorhexidine alcohol and sterilely draped. The leg was exsanguinated with an Ace wrap and tourniquet inflated to 300 mmHg. Patient had a 20 degree flexion contracture. The knee had quite a bit of swelling. Her old anterior incision was incised with a scalpel. Parapatellar arthrotomy was made. There was a large amount of gray cloudy fluid in the knee. There was gray and dark dark gray stained synovium that was resected with the Bovie. The patient had a DePuy LCS mobile-bearing knee system in. There was a metal-backed patellar component. The patella button had spine and the lateral aspect of the patella metal tray was rubbing against the lateral trochlea of the femoral component. There was a large amount of proximal staining of the knee that was resected with the Bovie. The bone implant interface of the patella was cleaned up with a thin flexible osteotomes and the patella metal-backed tray was removed with minimal bone loss. The patella already had quite a bit of bone loss or osteolysis. Cement was removed with a high-speed bur. The patella was thoroughly irrigated. The knee was flexed up.   The polyremoved by transecting the mobile-bearing post first with a saw. Knee was flexed up the implants were removed with thin flexible osteotomes offset osteotomes bone tamp and mallet. The bone loss was pretty minimal on the tibial side was a little more substantial on the femoral side. The edges of the bone were marked with the Bovie. Rotation alignment the femur was marked with the Bovie. Cement was removed from the femur and tibia with a high-speed bur. Once all the cement had been removed from the articular surfaces the reamers were then used to expand the canals. Went up to 1211 mm on both the femur and tibia. The knee was then thoroughly irrigated with 6 L of pulse lavage irrigation. The knee was trialed with the above implants and looked to be stable and the flexion extension gaps were filled and the knee was able to go through a full range of motion with good patellofemoral tracking. C-arm was brought in to verify good placement of the implants. The actual implants were then opened the knee flexed the knee irrigated and dried and the femur cemented first using antibiotic cement which contained 3 g of vancomycin 2 g Tobra 1 g Ancef per batch 1 batch was used for the femur and 1 batch used for the tibia. The tibia was then cemented in place once the femoral cement was dry. The backside of the tibial component was scuffed up with a high-speed bur prior to cementing it. Once cement was dry on the tibial side the knee was articulated. Tourniquet was released wound went through full range of motion was nice and stable. A deep drain was placed. Knee was closed in flexion with #2 Vicryl figure-of-eight sutures. Subcu closed with 2-0 Vicryl. Skin closed with 3-0 Vicryl and Prineo. Tissues were injected with Naropin. Sterile dressings applied. Plan be weightbearing as tolerated range of motion as tolerated. The antibiotic therapy. Follow cultures closely.   Patient was awakened extubated and transferred to cover room in stable condition.           Electronically signed by Aleyda Castellano MD on 7/21/2022 at 5:12 PM

## 2022-07-22 LAB
ANION GAP SERPL CALCULATED.3IONS-SCNC: 9 MMOL/L (ref 7–19)
BUN BLDV-MCNC: 28 MG/DL (ref 8–23)
CALCIUM SERPL-MCNC: 9.2 MG/DL (ref 8.8–10.2)
CHLORIDE BLD-SCNC: 100 MMOL/L (ref 98–111)
CO2: 26 MMOL/L (ref 22–29)
CREAT SERPL-MCNC: 1.2 MG/DL (ref 0.5–0.9)
GFR AFRICAN AMERICAN: 54
GFR NON-AFRICAN AMERICAN: 44
GLUCOSE BLD-MCNC: 190 MG/DL (ref 74–109)
HCT VFR BLD CALC: 28 % (ref 37–47)
HEMOGLOBIN: 9 G/DL (ref 12–16)
POTASSIUM REFLEX MAGNESIUM: 4.5 MMOL/L (ref 3.5–5)
SODIUM BLD-SCNC: 135 MMOL/L (ref 136–145)

## 2022-07-22 PROCEDURE — 85018 HEMOGLOBIN: CPT

## 2022-07-22 PROCEDURE — 80048 BASIC METABOLIC PNL TOTAL CA: CPT

## 2022-07-22 PROCEDURE — 97165 OT EVAL LOW COMPLEX 30 MIN: CPT

## 2022-07-22 PROCEDURE — 2580000003 HC RX 258: Performed by: ORTHOPAEDIC SURGERY

## 2022-07-22 PROCEDURE — 1210000000 HC MED SURG R&B

## 2022-07-22 PROCEDURE — 85014 HEMATOCRIT: CPT

## 2022-07-22 PROCEDURE — 6360000002 HC RX W HCPCS: Performed by: ORTHOPAEDIC SURGERY

## 2022-07-22 PROCEDURE — 97535 SELF CARE MNGMENT TRAINING: CPT

## 2022-07-22 PROCEDURE — 6370000000 HC RX 637 (ALT 250 FOR IP): Performed by: ORTHOPAEDIC SURGERY

## 2022-07-22 PROCEDURE — 97116 GAIT TRAINING THERAPY: CPT

## 2022-07-22 PROCEDURE — 97161 PT EVAL LOW COMPLEX 20 MIN: CPT

## 2022-07-22 PROCEDURE — 36415 COLL VENOUS BLD VENIPUNCTURE: CPT

## 2022-07-22 RX ADMIN — ASPIRIN 325 MG: 325 TABLET, COATED ORAL at 20:24

## 2022-07-22 RX ADMIN — MELOXICAM 3.75 MG: 7.5 TABLET ORAL at 08:50

## 2022-07-22 RX ADMIN — OXYCODONE 5 MG: 5 TABLET ORAL at 08:50

## 2022-07-22 RX ADMIN — ACETAMINOPHEN 325MG 650 MG: 325 TABLET ORAL at 20:24

## 2022-07-22 RX ADMIN — VANCOMYCIN HYDROCHLORIDE 750 MG: 750 INJECTION, POWDER, LYOPHILIZED, FOR SOLUTION INTRAVENOUS at 04:58

## 2022-07-22 RX ADMIN — SODIUM CHLORIDE, PRESERVATIVE FREE 10 ML: 5 INJECTION INTRAVENOUS at 20:25

## 2022-07-22 RX ADMIN — OXYCODONE 5 MG: 5 TABLET ORAL at 13:41

## 2022-07-22 RX ADMIN — WATER 2000 MG: 1 INJECTION INTRAMUSCULAR; INTRAVENOUS; SUBCUTANEOUS at 08:52

## 2022-07-22 RX ADMIN — SODIUM CHLORIDE, PRESERVATIVE FREE 10 ML: 5 INJECTION INTRAVENOUS at 09:43

## 2022-07-22 RX ADMIN — VANCOMYCIN HYDROCHLORIDE 750 MG: 750 INJECTION, POWDER, LYOPHILIZED, FOR SOLUTION INTRAVENOUS at 16:00

## 2022-07-22 RX ADMIN — ACETAMINOPHEN 325MG 650 MG: 325 TABLET ORAL at 13:40

## 2022-07-22 RX ADMIN — ACETAMINOPHEN 325MG 650 MG: 325 TABLET ORAL at 02:24

## 2022-07-22 RX ADMIN — ASPIRIN 325 MG: 325 TABLET, COATED ORAL at 08:50

## 2022-07-22 RX ADMIN — ACETAMINOPHEN 325MG 650 MG: 325 TABLET ORAL at 08:50

## 2022-07-22 RX ADMIN — OXYCODONE 5 MG: 5 TABLET ORAL at 17:52

## 2022-07-22 RX ADMIN — OXYCODONE 10 MG: 5 TABLET ORAL at 20:25

## 2022-07-22 ASSESSMENT — PAIN SCALES - GENERAL
PAINLEVEL_OUTOF10: 0
PAINLEVEL_OUTOF10: 7
PAINLEVEL_OUTOF10: 2
PAINLEVEL_OUTOF10: 0
PAINLEVEL_OUTOF10: 7
PAINLEVEL_OUTOF10: 0

## 2022-07-22 ASSESSMENT — PAIN DESCRIPTION - ORIENTATION
ORIENTATION: RIGHT

## 2022-07-22 ASSESSMENT — PAIN DESCRIPTION - LOCATION
LOCATION: KNEE

## 2022-07-22 ASSESSMENT — PAIN DESCRIPTION - DESCRIPTORS
DESCRIPTORS: ACHING
DESCRIPTORS: ACHING;DISCOMFORT;GNAWING
DESCRIPTORS: ACHING

## 2022-07-22 ASSESSMENT — PAIN - FUNCTIONAL ASSESSMENT
PAIN_FUNCTIONAL_ASSESSMENT: PREVENTS OR INTERFERES SOME ACTIVE ACTIVITIES AND ADLS
PAIN_FUNCTIONAL_ASSESSMENT: PREVENTS OR INTERFERES SOME ACTIVE ACTIVITIES AND ADLS

## 2022-07-22 NOTE — PLAN OF CARE
Problem: Discharge Planning  Goal: Discharge to home or other facility with appropriate resources  Outcome: Not Progressing     Problem: Chronic Conditions and Co-morbidities  Goal: Patient's chronic conditions and co-morbidity symptoms are monitored and maintained or improved  Outcome: Not Progressing     Problem: Safety - Adult  Goal: Free from fall injury  Outcome: Not Progressing     Problem: ABCDS Injury Assessment  Goal: Absence of physical injury  Outcome: Not Progressing     Problem: Pain  Goal: Verbalizes/displays adequate comfort level or baseline comfort level  Outcome: Not Progressing     Problem: Skin/Tissue Integrity - Adult  Goal: Skin integrity remains intact  Outcome: Not Progressing  Goal: Incisions, wounds, or drain sites healing without S/S of infection  Outcome: Not Progressing  Goal: Oral mucous membranes remain intact  Outcome: Not Progressing     Problem: Musculoskeletal - Adult  Goal: Return mobility to safest level of function  Outcome: Not Progressing  Goal: Maintain proper alignment of affected body part  Outcome: Not Progressing  Goal: Return ADL status to a safe level of function  Outcome: Not Progressing     Problem: Metabolic/Fluid and Electrolytes - Adult  Goal: Electrolytes maintained within normal limits  Outcome: Not Progressing  Goal: Hemodynamic stability and optimal renal function maintained  Outcome: Not Progressing  Goal: Glucose maintained within prescribed range  Outcome: Not Progressing     Problem: Hematologic - Adult  Goal: Maintains hematologic stability  Outcome: Not Progressing     Problem: Discharge Planning  Goal: Discharge to home or other facility with appropriate resources  Outcome: Not Progressing     Problem: Chronic Conditions and Co-morbidities  Goal: Patient's chronic conditions and co-morbidity symptoms are monitored and maintained or improved  Outcome: Not Progressing     Problem: Safety - Adult  Goal: Free from fall injury  Outcome: Not Progressing Problem: ABCDS Injury Assessment  Goal: Absence of physical injury  Outcome: Not Progressing     Problem: Pain  Goal: Verbalizes/displays adequate comfort level or baseline comfort level  Outcome: Not Progressing     Problem: Skin/Tissue Integrity - Adult  Goal: Skin integrity remains intact  Outcome: Not Progressing  Goal: Incisions, wounds, or drain sites healing without S/S of infection  Outcome: Not Progressing  Goal: Oral mucous membranes remain intact  Outcome: Not Progressing     Problem: Musculoskeletal - Adult  Goal: Return mobility to safest level of function  Outcome: Not Progressing  Goal: Maintain proper alignment of affected body part  Outcome: Not Progressing  Goal: Return ADL status to a safe level of function  Outcome: Not Progressing     Problem: Metabolic/Fluid and Electrolytes - Adult  Goal: Electrolytes maintained within normal limits  Outcome: Not Progressing  Goal: Hemodynamic stability and optimal renal function maintained  Outcome: Not Progressing  Goal: Glucose maintained within prescribed range  Outcome: Not Progressing     Problem: Hematologic - Adult  Goal: Maintains hematologic stability  Outcome: Not Progressing

## 2022-07-22 NOTE — CONSULTS
INFECTIOUS DISEASES CONSULT NOTE    Patient:  Daisha Harvey 71 y.o. female  ROOM # [unfilled]  YOB: 1950  MRN: [id number]  [facility]:  [id number]  Admit date: 7/21/2022   Admitting Physician: Christina Blackman [facility],*  Primary Care Physician: Haritha Sow MD  REFERRING PROVIDER: Fer Palacios    Reason for Consultation: Infected right total knee arthroplasty. Underwent explant and placement of antibiotic spacer. History of Present Illness/Chief Complaint: Pleasant 36-year-old woman. She indicates about 6 years ago she had a right knee arthroplasty. She indicates she had had some discomfort swelling and warmth. She had not had fevers or chills. There is concern for infection involving her right knee arthroplasty. She was taken to surgery yesterday. She underwent removal of implants, irrigation, debridement, and placement of articulating antibiotic spacer. She is currently receiving empiric therapy with vancomycin. Infectious disease asked to evaluate and offer recommendations. Current Scheduled Medications:    sodium chloride flush  5-40 mL IntraVENous 2 times per day    acetaminophen  650 mg Oral Q6H    meloxicam  3.75 mg Oral Daily    aspirin  325 mg Oral BID    vancomycin (VANCOCIN) IV  750 mg IntraVENous Q12H    lidocaine 1 % injection  5 mL IntraDERmal Once    vancomycin (VANCOCIN) intermittent dosing (placeholder)   Other RX Placeholder     Current PRN Medications:  sodium chloride flush, sodium chloride, oxyCODONE **OR** oxyCODONE, morphine **OR** morphine    Allergies:  No Known Allergies    Past Medical History: Elevated cholesterol. Diabetes mellitus. Degenerative arthritis. Past Surgical History: Prior right total knee arthroplasty. Broken ankle. Social History: No tobacco, alcohol, or illicit drug use.     Family History: Noncontributory    Exposure History: No close contacts of been ill    Review of Systems:  No cough or sputum production  No nausea vomiting      Vital Signs:  BP (!) 147/74   Pulse 91   Temp 97.8 °F (36.6 °C)   Resp 16   Ht 5' 6\" (1.676 m)   Wt 190 lb (86.2 kg)   SpO2 100%   BMI 30.67 kg/m²  Temp (24hrs), Av.3 °F (36.3 °C), Min:96.8 °F (36 °C), Max:97.8 °F (36.6 °C)    Physical Exam:   Vital signs reviewed. Alert, pleasant, no distress  Lungs clear to auscultation no crackles wheezes  Heart regular rhythm without murmur  Abdomen is soft and nontender  Right knee dressing in place  Drains in place right knee with some serosanguineous drainage    Lab Results:  CBC:   Recent Labs     22   HGB 9.0*   HCT 28.0*     CMP:   Recent Labs     22   *   K 4.5      CO2 26   BUN 28*   CREATININE 1.2*   CALCIUM 9.2   GLUCOSE 190*     Culture:   Operative cultures from 2022-no growth to date    Impression:   Presumptive infection involving right knee arthroplasty. She has undergone removal, irrigation, debridement, and placement of antibiotic impregnated spacer.     Recommendations:    Suggest empiric treatment with vancomycin and cefepime  Await operative cultures  Will continue to follow  Final antibiotic plans will depend upon clinical course and final culture results  Reviewed with patient plan for 6 weeks of intravenous antibiotic therapy    Moraima Kaplan MD  22  6:09 PM

## 2022-07-22 NOTE — PROGRESS NOTES
Subjective:     Post-Operative Day: 1 No complaints    Objective:     Patient Vitals for the past 24 hrs:   BP Temp Temp src Pulse Resp SpO2 Height Weight   07/22/22 0631 136/60 96.8 °F (36 °C) Temporal 91 16 95 % -- --   07/22/22 0300 -- -- -- -- 16 -- -- --   07/22/22 0224 -- -- -- -- 18 -- -- --   07/22/22 0015 (!) 95/49 97.2 °F (36.2 °C) Temporal 91 16 96 % -- --   07/21/22 2330 -- -- -- -- 16 -- -- --   07/21/22 2300 -- -- -- -- 16 -- -- --   07/21/22 2228 -- -- -- -- 16 -- -- --   07/21/22 1822 127/62 96.8 °F (36 °C) Temporal 82 16 98 % -- --   07/21/22 1815 138/68 97.6 °F (36.4 °C) Temporal 83 19 94 % -- --   07/21/22 1810 138/68 -- -- 84 12 94 % -- --   07/21/22 1805 137/68 -- -- 81 18 97 % -- --   07/21/22 1800 137/65 -- -- 81 22 96 % -- --   07/21/22 1755 (!) 132/59 -- -- 83 16 91 % -- --   07/21/22 1750 130/66 -- -- 82 26 96 % -- --   07/21/22 1745 130/61 -- -- 84 19 92 % -- --   07/21/22 1740 136/67 -- -- 84 15 93 % -- --   07/21/22 1735 135/69 -- -- 85 15 95 % -- --   07/21/22 1732 114/62 97.8 °F (36.6 °C) Temporal 99 15 93 % -- --   07/21/22 1254 (!) 150/68 99.2 °F (37.3 °C) Temporal 97 20 100 % 5' 6\" (1.676 m) 190 lb (86.2 kg)       General: Alert cooperative   Wound: Clean dry intact. Moderate swelling   Neurovascular: Exam normal   DVT Exam: No evidence of DVT         Data Review:  Recent Labs     07/22/22  0311   HGB 9.0*     Recent Labs     07/22/22 0311   *   K 4.5   CREATININE 1.2*   GLUCOSE 190*     Recent Labs     07/21/22  1303   POCGLU 96     XR KNEE RIGHT (1-2 VIEWS)   Final Result   Status post right knee arthroplasty. Surgically related subcutaneous emphysema, surgical drain, and swelling. Recommendation:   Follow up recommended. Electronically Signed by Gary Rosa MD at 22-Jul-2022 01:11:34 AM               Drain 70 ml    Assessment:     Status Post right Total Knee Arthroplasty. Doing well postop without complication.    Plan:   Leave drain in for now  Follow cultures  Consult Infectious Disease  Pic line  IV antibiotics (Vanco/Cefepime)   Pain control  Tight blood glucose control  PT/OT  DVT prophylaxis  Ice and elevate  Discharge Home next week

## 2022-07-22 NOTE — CARE COORDINATION
CM met with pt/spouse at the bedside. Pt reports, spouse will be available to assist. Pt aware of HC order. Pt has BSC in room and walker at home. ID consult pending. Pt plans to go their cabin which does not have steps, one level and on a pond at discharge. 07/22/22 7963   Service Assessment   Patient Orientation Alert and Oriented   Cognition Alert   History Provided By Patient   Primary [address] is:   ( POA)   PCP Verified by CM Yes   Prior Functional Level Independent in ADLs/IADLs   Current Functional Level Independent in ADLs/IADLs   Can patient return to prior living arrangement Yes   Ability to make needs known: Good   Family able to assist with home care needs: Yes   Would you like for me to discuss the discharge plan with any other family members/significant others, and if so, who? Yes   Financial Resources Rajan Howard   (none needed)   CM/SW Referral   (none needed)   Social/Functional History   Lives With Spouse   Type of [address] One level  (pt planning on staying at their cabin no stairs)   Home Access   (cabin NO stairs)   Bathroom Shower/Tub Tub/Shower unit   [facility] 21   Transfer Assistance Independent   Active  Yes   Occupation Retired   Discharge Planning   Living Arrangements Spouse/Significant Other   Potential Assistance Needed N/A   DME Ordered?  No   Potential Assistance Purchasing Medications No   Patient expects to be discharged to: Ale[facility] Miller 90 Discharge   Transition of Care Consult (CM Consult) [facility]   Mode of Transport at Discharge Self   Confirm Follow Up Transport Family   PCP Robel Renee    Patient Contact Information:    Felix Crystal  [phone] (home)   Telephone Information:   Mobile [phone]     Above information verified? [x]   Yes  []   No      Emergency Contacts:    Extended Emergency Contact Information  Primary Emergency Contact: Cornel Ríos   [address] Phone: [phone]  Mobile Phone: [phone]  Relation: Spouse      Have you been vaccinated for COVID-19 (SARS-CoV-2)? [x]   Yes  []   No                   If so, when?     Which :         []   Pfizer-BioNTech  [x]   Moderna 2 vaccines, 2 booster  []   Candelaria Sensor  []   Other:         Pharmacy:    [address] [place] [address] 6, [address] [employer] [facility] [phone] Alba Morelos [phone]  [address] [facility] [address]  Phone: [phone] Fax: Alec Dickey 107, [address] [facility] [phone] Alba Morelos [phone]  [address] [place] [address]  Phone: [phone] Fax: [phone]        Patient Deficits:    []   Yes   [x]   No

## 2022-07-22 NOTE — CARE COORDINATION
Nancy Rios RN Ortho Navigator  [phone]     Patient would like dme item to be delivered to Room #324. Please call if you have any questions. Fleet Grounds  7/21/2022 12:32 PM   Admission  Description: 71year old female Department: L 3 ALBERTO/VAS/MED       Patient Demographics    Name Patient ID SSN Gender Identity Birth Date   Robbin Negro [id number] Female [birth year] (72 yrs)     Address Phone Email     [address] [place] [address] [phone] (Y) [phone] (M) [email or web address]       Reg Status PCP Date Last Verified Next Review Date    Verified Prieto Henley  [phone] 07/11/22 08/10/22        Insurance Payors as of 7/22/2022      MEDICARE    Plan: MEDICARE PART A AND B Member: [id number] Effective from: 1/1/2017   Subscriber: Nora BAER Subscriber ID: [id number] Guarantor: Ella Hopper 75: Marietta Lazaro FED[employer]AL Group: [id number] Member: [id number]   Effective from: 4/1/2022 Subscriber: Coretha Hodgkins Subscriber ID: [id number]   Guarantor: Coretha Hodgkins       Patient Contacts    Name Relation Home Work Mobile   Cornel Ríos Spouse [phone]     Electronically signed by Tawnya Chavira RN on 7/22/2022 at 6:46 AM

## 2022-07-22 NOTE — DISCHARGE INSTRUCTIONS
[facility] [place] [facility]      Total Knee and Uni Knee Replacement  Discharge Instructions     To prevent blood clots, you have been placed on the following medication:  Aspirin 81 mg twice a day for four weeks    Surgical Site Care: Showering is permitted on post op day 2 - Saturday  No submersion in a bath, swimming pool, whirlpool, etc    Physical Therapy:  You were given a prescription for outpatient therapy. Please schedule with [facility]  if convenient by calling 1 [phone]. Otherwise, schedule with a therapist closer to your home  Work on range of motion. Goal for your first office visit is for you to fully straighten and bend your knee to at least a right angle   Don't walk for exercise (it will make you more sore)  Weight Bearing Status:  Full weight bearing with a walker     Pain Medications  You were given a prescription to fill at your pharmacy  Wean off pain medications as you deem appropriate as long as pain is under control  Take tylenol instead of the prescribed pain medicine as your pain improves    Cold packs                                                                                                               FEVER .5 or less        May be used as necessary                                                                    Take Tylenol x 2                                                                                                            Deep breath x 10   Please take a stool softener such as dulcolax or colace daily                                 Cough, cough, cough                                                                                                                        Recheck in 1 - 11/2                                                                                                                                            hours  Do not drive for four weeks  DO NOT SMOKE, VAPE OR CHEW!!!     Contact office if  Increased redness, swelling, drainage of any kind, and/or severe pain at surgery site. As well as new onset fevers and or chills. These could signify an infection. Calf or thigh tenderness to touch as well as increased swelling or redness. This could signify a clot. Any rash appears, increased  or new onset nausea/vomiting occur. This may indicate a reaction to a medication. Phone # 3  ext [phone]. Leave message for my assistant Jocelyne. She will promptly return your call. If you have an absolute emergency, text me with your name and problem at [phone].  (Dr. Celina Perales)    Follow up with Surgeon at scheduled appointment time. Thanks for the opportunity to care for you!

## 2022-07-22 NOTE — PROGRESS NOTES
Occupational Therapy  Facility/Department: [facility] 3 ALBERTO/VAS/MED  Occupational Therapy Initial Assessment    Name: Shereen Motley  : 1950  MRN: [id number]  Date of Service: 2022    Discharge Recommendations:             Patient Diagnosis(es): The encounter diagnosis was Infection of total right knee replacement, initial encounter ([facility] Past Medical History:  has a past medical history of Carotid stenosis, Colonic polyp, DM (diabetes mellitus) ([facility] Essential hypertension, Gastric ulcer, GERD (gastroesophageal reflux disease), Hiatal hernia, Hyperlipidemia, Iron deficiency anemia secondary to blood loss (chronic), OA (osteoarthritis), and Status post dilatation of esophageal stricture. Past Surgical History:  has a past surgical history that includes Knee Arthroplasty (Right); Ankle surgery (Left); Colonoscopy (2018); and Upper gastrointestinal endoscopy (2018). Treatment Diagnosis: R knee explant and insertion of antibiotic spacers      Assessment   Performance deficits / Impairments: Decreased functional mobility ; Decreased ADL status; Decreased balance  Assessment: Will progress as tolerated  Treatment Diagnosis: R knee explant and insertion of antibiotic spacers  Prognosis: Good  Decision Making: Low Complexity  REQUIRES OT FOLLOW-UP: Yes  Activity Tolerance  Activity Tolerance: Patient Tolerated treatment well        Plan   Plan  Times per Week: 3-5x/week  Times per Day: Daily     Restrictions       Subjective   General  Chart Reviewed: Yes  Patient assessed for rehabilitation services?: Yes  Family / Caregiver Present: Yes  Diagnosis: R knee explant and insertion of antibiotic spacer     Social/Functional History  Social/Functional History  Lives With: Spouse  Type of Home: House  Home Layout: One level  Home Equipment: Walker, rolling  ADL Assistance: Independent  Ambulation Assistance: Independent  Transfer Assistance: Independent  Additional Comments:  is available and can assist with ADLs as needed       Objective   Heart Rate: 88  Heart Rate Source: Monitor  BP: 128/62  BP Location: Right Arm  BP Method: Automatic  MAP (Calculated): 84  Resp: 16  SpO2: 98 %  O2 Device: None (Room air)                      AROM: Within functional limits  Strength:  Within functional limits  Coordination: Within functional limits  ADL  Feeding: Independent  Grooming: Independent  UE Bathing: Supervision  LE Bathing: Minimal assistance  UE Dressing: Supervision  LE Dressing: Minimal assistance  Toileting: Contact guard assistance           Transfers  Stand Step Transfers: Contact guard assistance  Sit to stand: Stand by assistance;Contact guard assistance  Transfer Comments: RW  Vision  Vision: Within Functional Limits  Hearing  Hearing: Within functional limits  Cognition  Overall Cognitive Status: WNL  Orientation  Overall Orientation Status: Within Normal Limits                                           G-Code     OutComes Score                                                  AM-PAC Score             Tinneti Score       Goals  Short Term Goals  Time Frame for Short term goals: 1 week  Short Term Goal 1: Supervision with toilet tfers  Short Term Goal 2: Supervision with clothing mgmt in standing  Short Term Goal 3: Supervision with seated LB dsg  Long Term Goals  Long Term Goal 1: Return to PLOF       Therapy Time   Individual Concurrent Group Co-treatment   Time In           Time Out           Minutes                   Shannan Ag, OT

## 2022-07-22 NOTE — DISCHARGE INSTR - DIET
Good nutrition is important when healing from an illness, injury, or surgery. Follow any nutrition recommendations given to you during your hospital stay. If you were given an oral nutrition supplement while in the hospital, continue to take this supplement at home. You can take it with meals, in-between meals, and/or before bedtime. These supplements can be purchased at most local grocery stores, pharmacies, and chain [facility]-stores. If you have any questions about your diet or nutrition, call the hospital and ask for the dietitian.             Low carb / High protein

## 2022-07-22 NOTE — PROGRESS NOTES
Physical Therapy  Facility/Department: [facility] 3 ALBERTO/VAS/MED  Physical Therapy Initial Assessment    Name: Pierre Khan  : 1950  MRN: [id number]  Date of Service: 2022    Discharge Recommendations:  Continue to assess pending progress, Patient would benefit from continued therapy after discharge          Patient Diagnosis(es): The encounter diagnosis was Infection of total right knee replacement, initial encounter ([facility] [address] Past Medical History:  has a past medical history of Carotid stenosis, Colonic polyp, DM (diabetes mellitus) ([facility] [address] Essential hypertension, Gastric ulcer, GERD (gastroesophageal reflux disease), Hiatal hernia, Hyperlipidemia, Iron deficiency anemia secondary to blood loss (chronic), OA (osteoarthritis), and Status post dilatation of esophageal stricture. Past Surgical History:  has a past surgical history that includes Knee Arthroplasty (Right); Ankle surgery (Left); Colonoscopy (2018); and Upper gastrointestinal endoscopy (2018). Assessment   Body Structures, Functions, Activity Limitations Requiring Skilled Therapeutic Intervention: Decreased functional mobility ; Decreased strength;Decreased safe awareness;Decreased ROM; Decreased balance; Increased pain  Assessment: Pt would benefit from skilled PT in this setting for mobility  post op placment of abx spacers in R knee.   Therapy Prognosis: Good  Decision Making: Low Complexity  Requires PT Follow-Up: Yes  Activity Tolerance  Activity Tolerance: Patient tolerated treatment well     Plan   Plan  Plan: 5-7 times per week  Plan weeks: 2  Current Treatment Recommendations: Strengthening, ROM, Functional mobility training, Transfer training, Gait training, Pain management, Safety education & training, Positioning, Patient/Caregiver education & training, Therapeutic activities  Plan Comment: wbat  Safety Devices  Type of Devices: Gait belt, Left in chair, Nurse notified, Call light within reach Restrictions  Restrictions/Precautions  Restrictions/Precautions: Weight Bearing  Lower Extremity Weight Bearing Restrictions  Right Lower Extremity Weight Bearing: Weight Bearing As Tolerated     Subjective   General  Patient assessed for rehabilitation services?: Yes  Diagnosis: failed R TKR, placement of ABX spacers  Follows Commands: Within Functional Limits  Subjective  Subjective: Pt reports minimal pain and states she is willing to amb in room with PT. c/o mild dizziness that did not appear to affect her mobility         Social/Functional History  Social/Functional History  Lives With: Spouse  Type of Home: House  Home Layout: One level  Home Access: Stairs to enter with rails  Entrance Stairs - Number of Steps: 2  Home Equipment: Walker, rolling  ADL Assistance: Independent  Ambulation Assistance: Independent  Transfer Assistance: Independent  Additional Comments:  is available and can assist with ADLs as needed  Vision/Hearing       Cognition   Orientation  Overall Orientation Status: Within Normal Limits  Cognition  Overall Cognitive Status: WNL     Objective   Heart Rate: 88  Heart Rate Source: Monitor  BP: 128/62  BP Location: Right Arm  BP Method: Automatic  MAP (Calculated): 84  Resp: 16  SpO2: 98 %  O2 Device: None (Room air)     Observation/Palpation  Observation: has a bandage and a drain on R LE        AROM RLE (degrees)  RLE General AROM: able to sit and flex knee to grossly 80 deg  AROM LLE (degrees)  LLE AROM : WFL  Strength RLE  Comment: able to extend knee against gravity  Strength LLE  Strength LLE: WFL           Bed mobility  Supine to Sit: Stand by assistance  Transfers  Sit to Stand: Contact guard assistance  Stand to sit: Contact guard assistance  Ambulation  Surface: level tile  Device: Rolling Walker  Assistance: Contact guard assistance  Quality of Gait: flexed posture  Gait Deviations: Slow Minna;Decreased step length;Decreased step height  Distance: 20 ft Goals  Short Term Goals  Time Frame for Short term goals: 2 wks  Short term goal 1: amb 100 ft with rw, CGA       Education  Patient Education  Education Given To: Patient  Education Provided: Role of Therapy;Plan of Care;Transfer Training  Education Method: Verbal  Barriers to Learning: None  Education Outcome: Verbalized understanding      Therapy Time   Individual Concurrent Group Co-treatment   Time In           Time Out           Minutes                   Darrian Adams PT   Electronically signed by Darrian Adams PT on 7/22/2022 at 1:29 PM

## 2022-07-23 LAB
HCT VFR BLD CALC: 25.5 % (ref 37–47)
HEMOGLOBIN: 8.2 G/DL (ref 12–16)
VANCOMYCIN TROUGH: 20 UG/ML (ref 10–20)

## 2022-07-23 PROCEDURE — 1210000000 HC MED SURG R&B

## 2022-07-23 PROCEDURE — 6370000000 HC RX 637 (ALT 250 FOR IP): Performed by: ORTHOPAEDIC SURGERY

## 2022-07-23 PROCEDURE — 2580000003 HC RX 258: Performed by: ORTHOPAEDIC SURGERY

## 2022-07-23 PROCEDURE — 6360000002 HC RX W HCPCS: Performed by: ORTHOPAEDIC SURGERY

## 2022-07-23 PROCEDURE — 80202 ASSAY OF VANCOMYCIN: CPT

## 2022-07-23 PROCEDURE — 36415 COLL VENOUS BLD VENIPUNCTURE: CPT

## 2022-07-23 PROCEDURE — 85018 HEMOGLOBIN: CPT

## 2022-07-23 PROCEDURE — 97116 GAIT TRAINING THERAPY: CPT

## 2022-07-23 PROCEDURE — 85014 HEMATOCRIT: CPT

## 2022-07-23 PROCEDURE — 97530 THERAPEUTIC ACTIVITIES: CPT

## 2022-07-23 RX ADMIN — OXYCODONE 10 MG: 5 TABLET ORAL at 13:37

## 2022-07-23 RX ADMIN — ASPIRIN 325 MG: 325 TABLET, COATED ORAL at 19:43

## 2022-07-23 RX ADMIN — ACETAMINOPHEN 325MG 650 MG: 325 TABLET ORAL at 19:43

## 2022-07-23 RX ADMIN — ACETAMINOPHEN 325MG 650 MG: 325 TABLET ORAL at 08:20

## 2022-07-23 RX ADMIN — MELOXICAM 3.75 MG: 7.5 TABLET ORAL at 08:21

## 2022-07-23 RX ADMIN — ASPIRIN 325 MG: 325 TABLET, COATED ORAL at 08:21

## 2022-07-23 RX ADMIN — OXYCODONE 10 MG: 5 TABLET ORAL at 06:52

## 2022-07-23 RX ADMIN — OXYCODONE 10 MG: 5 TABLET ORAL at 23:54

## 2022-07-23 RX ADMIN — OXYCODONE 10 MG: 5 TABLET ORAL at 19:44

## 2022-07-23 RX ADMIN — SODIUM CHLORIDE 25 ML: 9 INJECTION, SOLUTION INTRAVENOUS at 08:32

## 2022-07-23 RX ADMIN — SODIUM CHLORIDE, PRESERVATIVE FREE 10 ML: 5 INJECTION INTRAVENOUS at 19:49

## 2022-07-23 RX ADMIN — SODIUM CHLORIDE, PRESERVATIVE FREE 10 ML: 5 INJECTION INTRAVENOUS at 08:21

## 2022-07-23 RX ADMIN — ACETAMINOPHEN 325MG 650 MG: 325 TABLET ORAL at 02:04

## 2022-07-23 RX ADMIN — Medication 1000 MG: at 08:33

## 2022-07-23 RX ADMIN — ACETAMINOPHEN 325MG 650 MG: 325 TABLET ORAL at 13:32

## 2022-07-23 ASSESSMENT — PAIN DESCRIPTION - LOCATION
LOCATION: KNEE
LOCATION: KNEE;LEG
LOCATION: KNEE

## 2022-07-23 ASSESSMENT — PAIN SCALES - GENERAL
PAINLEVEL_OUTOF10: 7
PAINLEVEL_OUTOF10: 0
PAINLEVEL_OUTOF10: 8
PAINLEVEL_OUTOF10: 7
PAINLEVEL_OUTOF10: 7
PAINLEVEL_OUTOF10: 0
PAINLEVEL_OUTOF10: 0
PAINLEVEL_OUTOF10: 5
PAINLEVEL_OUTOF10: 3
PAINLEVEL_OUTOF10: 0

## 2022-07-23 ASSESSMENT — PAIN DESCRIPTION - ORIENTATION
ORIENTATION: RIGHT

## 2022-07-23 ASSESSMENT — PAIN DESCRIPTION - DESCRIPTORS
DESCRIPTORS: ACHING;DISCOMFORT
DESCRIPTORS: ACHING;DISCOMFORT;GNAWING
DESCRIPTORS: ACHING
DESCRIPTORS: ACHING;DISCOMFORT;GNAWING
DESCRIPTORS: ACHING;DISCOMFORT;GNAWING
DESCRIPTORS: ACHING;DISCOMFORT

## 2022-07-23 NOTE — PROGRESS NOTES
Subjective:     Post-Operative Day: 2 No complaints    Objective:     Patient Vitals for the past 24 hrs:   BP Temp Temp src Pulse Resp SpO2   07/23/22 1001 (!) 108/58 97.9 °F (36.6 °C) Temporal 82 16 99 %   07/23/22 0652 -- -- -- -- 16 --   07/23/22 0638 (!) 123/56 98.8 °F (37.1 °C) Temporal 86 18 96 %   07/23/22 0234 -- -- -- -- 16 --   07/23/22 0204 -- -- -- -- 16 --   07/23/22 0049 (!) 117/50 99 °F (37.2 °C) Temporal 79 18 94 %   07/22/22 2055 -- -- -- -- 16 --   07/22/22 2054 -- -- -- -- 16 --   07/22/22 2024 -- -- -- -- 16 --   07/22/22 1822 -- -- -- -- 16 --   07/22/22 1734 (!) 147/74 97.8 °F (36.6 °C) Oral 91 16 100 %   07/22/22 1600 (!) 147/74 97.8 °F (36.6 °C) Oral 91 16 100 %   07/22/22 1411 -- -- -- -- 16 --         General: Alert cooperative   Wound: Clean dry intact. Moderate swelling   Neurovascular: Exam normal   DVT Exam: No evidence of DVT         Data Review:  Recent Labs     07/22/22  0311 07/23/22  0341   HGB 9.0* 8.2*       Recent Labs     07/22/22  0311   *   K 4.5   CREATININE 1.2*   GLUCOSE 190*       Recent Labs     07/21/22  1303   POCGLU 96       XR KNEE RIGHT (1-2 VIEWS)   Final Result   Status post right knee arthroplasty. Surgically related subcutaneous emphysema, surgical drain, and swelling. Recommendation:   Follow up recommended. Electronically Signed by Sayda Bellamy MD at 22-Jul-2022 01:11:34 AM                 Drain 70 90 10 ml  Cx neg x 5 to date  Assessment:     Status Post right Total Knee Arthroplasty. Doing well postop without complication.    Plan:   Remove drain  Follow cultures  Pic line  IV antibiotics (Vanco/Cefepime)   Pain control  Tight blood glucose control  PT/OT  DVT prophylaxis  Ice and elevate  Discharge Home next week

## 2022-07-23 NOTE — PLAN OF CARE
Problem: Discharge Planning  Goal: Discharge to home or other facility with appropriate resources  Outcome: Progressing  Flowsheets (Taken 7/22/2022 2015)  Discharge to home or other facility with appropriate resources:   Identify barriers to discharge with patient and caregiver   Arrange for needed discharge resources and transportation as appropriate   Identify discharge learning needs (meds, wound care, etc)   Arrange for interpreters to assist at discharge as needed   Refer to discharge planning if patient needs post-hospital services based on physician order or complex needs related to functional status, cognitive ability or social support system     Problem: Chronic Conditions and Co-morbidities  Goal: Patient's chronic conditions and co-morbidity symptoms are monitored and maintained or improved  Outcome: Progressing  Flowsheets (Taken 7/22/2022 2015)  Care Plan - Patient's Chronic Conditions and Co-Morbidity Symptoms are Monitored and Maintained or Improved:   Monitor and assess patient's chronic conditions and comorbid symptoms for stability, deterioration, or improvement   Collaborate with multidisciplinary team to address chronic and comorbid conditions and prevent exacerbation or deterioration   Update acute care plan with appropriate goals if chronic or comorbid symptoms are exacerbated and prevent overall improvement and discharge     Problem: Safety - Adult  Goal: Free from fall injury  Outcome: Progressing     Problem: ABCDS Injury Assessment  Goal: Absence of physical injury  Outcome: Progressing     Problem: Pain  Goal: Verbalizes/displays adequate comfort level or baseline comfort level  Outcome: Progressing     Problem: Skin/Tissue Integrity - Adult  Goal: Skin integrity remains intact  Outcome: Progressing  Flowsheets  Taken 7/22/2022 2015 by Nasima Butler RN  Skin Integrity Remains Intact:   Monitor for areas of redness and/or skin breakdown   Assess vascular access sites hourly   Every 4-6 hours minimum: Change oxygen saturation probe site   Every 4-6 hours: If on nasal continuous positive airway pressure, respiratory therapy assesses nares and determine need for appliance change or resting period  Taken 7/22/2022 1116 by Ana De RN  Skin Integrity Remains Intact:   Assess vascular access sites hourly   Monitor for areas of redness and/or skin breakdown  Goal: Incisions, wounds, or drain sites healing without S/S of infection  Outcome: Progressing  Flowsheets (Taken 7/22/2022 2015)  Incisions, Wounds, or Drain Sites Healing Without Sign and Symptoms of Infection:   ADMISSION and DAILY: Assess and document risk factors for pressure ulcer development   TWICE DAILY: Assess and document skin integrity   TWICE DAILY: Assess and document dressing/incision, wound bed, drain sites and surrounding tissue   Implement wound care per orders   Initiate isolation precautions as appropriate   Initiate pressure ulcer prevention bundle as indicated  Goal: Oral mucous membranes remain intact  Outcome: Progressing  Flowsheets (Taken 7/22/2022 2015)  Oral Mucous Membranes Remain Intact:   Assess oral mucosa and hygiene practices   Implement preventative oral hygiene regimen   Implement oral medicated treatments as ordered     Problem: Musculoskeletal - Adult  Goal: Return mobility to safest level of function  Outcome: Progressing  Flowsheets (Taken 7/22/2022 2015)  Return Mobility to Safest Level of Function:   Assess patient stability and activity tolerance for standing, transferring and ambulating with or without assistive devices   Assist with transfers and ambulation using safe patient handling equipment as needed   Ensure adequate protection for wounds/incisions during mobilization   Obtain physical therapy/occupational therapy consults as needed   Apply continuous passive motion per provider or physical therapy orders to increase flexion toward goal   Instruct patient/family in ordered activity level  Goal: Maintain proper alignment of affected body part  Outcome: Progressing  Flowsheets (Taken 7/22/2022 2015)  Maintain proper alignment of affected body part:   Support and protect limb and body alignment per provider's orders   Instruct and reinforce with patient and family use of appropriate assistive device and precautions (e.g. spinal or hip dislocation precautions)  Goal: Return ADL status to a safe level of function  Outcome: Progressing  Flowsheets (Taken 7/22/2022 2015)  Return ADL Status to a Safe Level of Function:   Administer medication as ordered   Assess activities of daily living deficits and provide assistive devices as needed   Obtain physical therapy/occupational therapy consults as needed   Assist and instruct patient to increase activity and self care as tolerated     Problem: Metabolic/Fluid and Electrolytes - Adult  Goal: Electrolytes maintained within normal limits  Outcome: Progressing  Flowsheets (Taken 7/22/2022 2015)  Electrolytes maintained within normal limits:   Monitor labs and assess patient for signs and symptoms of electrolyte imbalances   Administer electrolyte replacement as ordered   Monitor response to electrolyte replacements, including repeat lab results as appropriate   Fluid restriction as ordered   Instruct patient on fluid and nutrition restrictions as appropriate  Goal: Hemodynamic stability and optimal renal function maintained  Outcome: Progressing  Flowsheets (Taken 7/22/2022 2015)  Hemodynamic stability and optimal renal function maintained:   Monitor labs and assess for signs and symptoms of volume excess or deficit   Monitor intake, output and patient weight   Monitor urine specific gravity, serum osmolarity and serum sodium as indicated or ordered   Monitor response to interventions for patient's volume status, including labs, urine output, blood pressure (other measures as available)   Encourage oral intake as appropriate   Instruct patient on fluid and nutrition restrictions as appropriate  Goal: Glucose maintained within prescribed range  Outcome: Progressing  Flowsheets (Taken 7/22/2022 2015)  Glucose maintained within prescribed range:   Monitor blood glucose as ordered   Assess for signs and symptoms of hyperglycemia and hypoglycemia   Administer ordered medications to maintain glucose within target range   Assess barriers to adequate nutritional intake and initiate nutrition consult as needed   Instruct patient on self management of diabetes and initiate consult as needed     Problem: Hematologic - Adult  Goal: Maintains hematologic stability  Outcome: Progressing  Flowsheets (Taken 7/22/2022 2015)  Maintains hematologic stability:   Assess for signs and symptoms of bleeding or hemorrhage   Monitor labs for bleeding or clotting disorders   Administer blood products/factors as ordered

## 2022-07-23 NOTE — PROGRESS NOTES
[address] [facility] Pharmacokinetic Monitoring Service - Vancomycin    Consulting Provider: Dr. Georgetta Siemens   Indication: Bone and Joint infection  Target Concentration: Goal AUC/GODWIN 400-600 mg*hr/L  Day of Therapy: 3   Additional Antimicrobials: none    Pertinent Laboratory Values: Wt Readings from Last 1 Encounters:   07/21/22 190 lb (86.2 kg)     Temp Readings from Last 1 Encounters:   07/23/22 99 °F (37.2 °C) (Temporal)     Estimated Creatinine Clearance: 48 mL/min (A) (based on SCr of 1.2 mg/dL (H)). Recent Labs     07/22/22  0311   CREATININE 1.2*     Procalcitonin: not ordered    Pertinent Cultures:  Culture Date Source Results   07/21/22 Surgical  No organisms seen   MRSA Nasal Swab: N/A. Non-respiratory infection.     Recent vancomycin administrations                     vancomycin (VANCOCIN) 750 mg in dextrose 5 % 250 mL IVPB (mg) 750 mg New Bag 07/22/22 1600     750 mg New Bag  0458    vancomycin (VANCOCIN) 1,500 mg in dextrose 5 % 500 mL IVPB (mg) 1,500 mg New Bag 07/21/22 1550    vancomycin (VANCOCIN) injection (mg) 12,000 mg Given 07/21/22 1512                    Assessment:  Date/Time Current Dose Concentration Timing of Concentration (h) AUC   07/23 at 0341 Vancomycin 750 mg IV every 12 hours 20.0 11 hours 41 minutes 675   Note: Serum concentrations collected for AUC dosing may appear elevated if collected in close proximity to the dose administered, this is not necessarily an indication of toxicity    Plan:  Current dosing regimen is supra-therapeutic  \"Decrease dose to Vancomycin 1000 mg IV every 24 hours  Repeat vancomycin concentration ordered for 07/25 @  0730  Pharmacy will continue to monitor patient and adjust therapy as indicated    Thank you for the consult,  TAMRA Stauffer [facility]  7/23/2022 5:09 AM

## 2022-07-23 NOTE — PLAN OF CARE
Problem: Discharge Planning  Goal: Discharge to home or other facility with appropriate resources  7/23/2022 1044 by Izabela Cali RN  Outcome: Progressing  7/23/2022 0027 by Ha Pepper RN  Outcome: Progressing  Flowsheets (Taken 7/22/2022 2015)  Discharge to home or other facility with appropriate resources:   Identify barriers to discharge with patient and caregiver   Arrange for needed discharge resources and transportation as appropriate   Identify discharge learning needs (meds, wound care, etc)   Arrange for interpreters to assist at discharge as needed   Refer to discharge planning if patient needs post-hospital services based on physician order or complex needs related to functional status, cognitive ability or social support system     Problem: Chronic Conditions and Co-morbidities  Goal: Patient's chronic conditions and co-morbidity symptoms are monitored and maintained or improved  7/23/2022 1044 by Izabela Cali RN  Outcome: Progressing  7/23/2022 0027 by Ha Pepper RN  Outcome: Progressing  Flowsheets (Taken 7/22/2022 2015)  Care Plan - Patient's Chronic Conditions and Co-Morbidity Symptoms are Monitored and Maintained or Improved:   Monitor and assess patient's chronic conditions and comorbid symptoms for stability, deterioration, or improvement   Collaborate with multidisciplinary team to address chronic and comorbid conditions and prevent exacerbation or deterioration   Update acute care plan with appropriate goals if chronic or comorbid symptoms are exacerbated and prevent overall improvement and discharge     Problem: Safety - Adult  Goal: Free from fall injury  7/23/2022 1044 by Izabela Cali RN  Outcome: Progressing  Flowsheets (Taken 7/23/2022 0028 by Ha Pepper RN)  Free From Fall Injury:   Instruct family/caregiver on patient safety   Based on caregiver fall risk screen, instruct family/caregiver to ask for assistance with transferring infant if caregiver noted to have fall risk factors  7/23/2022 0027 by Dorie Munoz RN  Outcome: Progressing     Problem: ABCDS Injury Assessment  Goal: Absence of physical injury  7/23/2022 1044 by Sharon Saldaña RN  Outcome: Progressing  Flowsheets (Taken 7/23/2022 0028 by Dorie Munoz RN)  Absence of Physical Injury: Implement safety measures based on patient assessment  7/23/2022 0027 by Dorie Munoz RN  Outcome: Progressing     Problem: Pain  Goal: Verbalizes/displays adequate comfort level or baseline comfort level  7/23/2022 1044 by Sharon Saldaña RN  Outcome: Progressing  7/23/2022 0027 by Dorie Munoz RN  Outcome: Progressing     Problem: Skin/Tissue Integrity - Adult  Goal: Skin integrity remains intact  7/23/2022 1044 by Sharon Saldaña RN  Outcome: Progressing  Flowsheets (Taken 7/23/2022 0028 by Dorie Munoz RN)  Skin Integrity Remains Intact:   Monitor for areas of redness and/or skin breakdown   Assess vascular access sites hourly   Every 4-6 hours minimum: Change oxygen saturation probe site   Every 4-6 hours: If on nasal continuous positive airway pressure, respiratory therapy assesses nares and determine need for appliance change or resting period  7/23/2022 0027 by Dorie Munoz RN  Outcome: Progressing  Flowsheets  Taken 7/22/2022 2015 by Dorie Munoz RN  Skin Integrity Remains Intact:   Monitor for areas of redness and/or skin breakdown   Assess vascular access sites hourly   Every 4-6 hours minimum: Change oxygen saturation probe site   Every 4-6 hours: If on nasal continuous positive airway pressure, respiratory therapy assesses nares and determine need for appliance change or resting period  Taken 7/22/2022 1116 by Tyesha Ramos RN  Skin Integrity Remains Intact:   Assess vascular access sites hourly   Monitor for areas of redness and/or skin breakdown  Goal: Incisions, wounds, or drain sites healing without S/S of infection  7/23/2022 1044 by Sharon Saldaña RN  Outcome: Progressing  Flowsheets (Taken 7/23/2022 0028 by Dorie Munoz RN)  Incisions, Wounds, or Drain Sites Healing Without Sign and Symptoms of Infection:   TWICE DAILY: Assess and document skin integrity   TWICE DAILY: Assess and document dressing/incision, wound bed, drain sites and surrounding tissue   Implement wound care per orders   Initiate isolation precautions as appropriate   Initiate pressure ulcer prevention bundle as indicated  7/23/2022 0027 by Breanne Guerrero RN  Outcome: Progressing  Flowsheets (Taken 7/22/2022 2015)  Incisions, Wounds, or Drain Sites Healing Without Sign and Symptoms of Infection:   ADMISSION and DAILY: Assess and document risk factors for pressure ulcer development   TWICE DAILY: Assess and document skin integrity   TWICE DAILY: Assess and document dressing/incision, wound bed, drain sites and surrounding tissue   Implement wound care per orders   Initiate isolation precautions as appropriate   Initiate pressure ulcer prevention bundle as indicated  Goal: Oral mucous membranes remain intact  7/23/2022 1044 by Cory Gonzalez RN  Outcome: Progressing  Flowsheets (Taken 7/23/2022 0028 by Breanne Guerrero RN)  Oral Mucous Membranes Remain Intact:   Assess oral mucosa and hygiene practices   Implement oral medicated treatments as ordered   Implement preventative oral hygiene regimen  7/23/2022 0027 by Breanne Guerrero RN  Outcome: Progressing  Flowsheets (Taken 7/22/2022 2015)  Oral Mucous Membranes Remain Intact:   Assess oral mucosa and hygiene practices   Implement preventative oral hygiene regimen   Implement oral medicated treatments as ordered     Problem: Musculoskeletal - Adult  Goal: Return mobility to safest level of function  7/23/2022 1044 by Cory Gonzalez RN  Outcome: Progressing  7/23/2022 0027 by Breanne Guerrero RN  Outcome: Progressing  Flowsheets (Taken 7/22/2022 2015)  Return Mobility to Safest Level of Function:   Assess patient stability and activity tolerance for standing, transferring and ambulating with or without assistive devices   Assist with transfers and appropriate  Goal: Hemodynamic stability and optimal renal function maintained  7/23/2022 1044 by Migel Gonzalez RN  Outcome: Progressing  7/23/2022 0027 by Ifeanyi Roth RN  Outcome: Progressing  Flowsheets (Taken 7/22/2022 2015)  Hemodynamic stability and optimal renal function maintained:   Monitor labs and assess for signs and symptoms of volume excess or deficit   Monitor intake, output and patient weight   Monitor urine specific gravity, serum osmolarity and serum sodium as indicated or ordered   Monitor response to interventions for patient's volume status, including labs, urine output, blood pressure (other measures as available)   Encourage oral intake as appropriate   Instruct patient on fluid and nutrition restrictions as appropriate  Goal: Glucose maintained within prescribed range  7/23/2022 1044 by Migel Gonzalez RN  Outcome: Progressing  7/23/2022 0027 by Ifeanyi Roth RN  Outcome: Progressing  Flowsheets (Taken 7/22/2022 2015)  Glucose maintained within prescribed range:   Monitor blood glucose as ordered   Assess for signs and symptoms of hyperglycemia and hypoglycemia   Administer ordered medications to maintain glucose within target range   Assess barriers to adequate nutritional intake and initiate nutrition consult as needed   Instruct patient on self management of diabetes and initiate consult as needed     Problem: Hematologic - Adult  Goal: Maintains hematologic stability  7/23/2022 1044 by Migel Gonzalez RN  Outcome: Progressing  7/23/2022 0027 by Ifeanyi Roth RN  Outcome: Progressing  Flowsheets (Taken 7/22/2022 2015)  Maintains hematologic stability:   Assess for signs and symptoms of bleeding or hemorrhage   Monitor labs for bleeding or clotting disorders   Administer blood products/factors as ordered

## 2022-07-23 NOTE — PROGRESS NOTES
Physical Therapy  Shereen Motley  [id number]     07/23/22 1151   Subjective   Subjective Agrees to work with therapy. Bed Mobility   Supine to Sit Contact guard assistance   Sit to Supine Contact guard assistance   Transfers   Sit to Stand Contact guard assistance   Stand to sit Contact guard assistance   Ambulation   Surface level tile   Device Rolling Walker   Assistance Contact guard assistance   Distance 25'   Other Activities   Comment Patient did well with therapy. Patient requested to return to the bed after treatment vs sitting up in the recliner. Patient positioned for comfort with all needs in reach and spouse present.    Short Term Goals   Time Frame for Short term goals 2 wks   Short term goal 1 amb 100 ft with rw, CGA   Activity Tolerance   Activity Tolerance Patient tolerated treatment well   PT Plan of Care   Saturday X   Electronically signed by Melvin Langford PTA on 7/23/2022 at 11:54 AM

## 2022-07-24 LAB
GLUCOSE BLD-MCNC: 114 MG/DL (ref 70–99)
HCT VFR BLD CALC: 25.2 % (ref 37–47)
HEMOGLOBIN: 7.7 G/DL (ref 12–16)
PERFORMED ON: ABNORMAL

## 2022-07-24 PROCEDURE — 82947 ASSAY GLUCOSE BLOOD QUANT: CPT

## 2022-07-24 PROCEDURE — 36415 COLL VENOUS BLD VENIPUNCTURE: CPT

## 2022-07-24 PROCEDURE — 6370000000 HC RX 637 (ALT 250 FOR IP): Performed by: ORTHOPAEDIC SURGERY

## 2022-07-24 PROCEDURE — 85018 HEMOGLOBIN: CPT

## 2022-07-24 PROCEDURE — 6360000002 HC RX W HCPCS: Performed by: ORTHOPAEDIC SURGERY

## 2022-07-24 PROCEDURE — 97116 GAIT TRAINING THERAPY: CPT

## 2022-07-24 PROCEDURE — 1210000000 HC MED SURG R&B

## 2022-07-24 PROCEDURE — 97530 THERAPEUTIC ACTIVITIES: CPT

## 2022-07-24 PROCEDURE — 2580000003 HC RX 258: Performed by: ORTHOPAEDIC SURGERY

## 2022-07-24 PROCEDURE — 85014 HEMATOCRIT: CPT

## 2022-07-24 RX ADMIN — ACETAMINOPHEN 325MG 650 MG: 325 TABLET ORAL at 13:48

## 2022-07-24 RX ADMIN — ACETAMINOPHEN 325MG 650 MG: 325 TABLET ORAL at 19:31

## 2022-07-24 RX ADMIN — OXYCODONE 5 MG: 5 TABLET ORAL at 13:49

## 2022-07-24 RX ADMIN — Medication 1000 MG: at 08:04

## 2022-07-24 RX ADMIN — OXYCODONE 10 MG: 5 TABLET ORAL at 06:44

## 2022-07-24 RX ADMIN — SODIUM CHLORIDE, PRESERVATIVE FREE 10 ML: 5 INJECTION INTRAVENOUS at 19:42

## 2022-07-24 RX ADMIN — ASPIRIN 325 MG: 325 TABLET, COATED ORAL at 06:47

## 2022-07-24 RX ADMIN — ACETAMINOPHEN 325MG 650 MG: 325 TABLET ORAL at 06:45

## 2022-07-24 RX ADMIN — OXYCODONE 10 MG: 5 TABLET ORAL at 19:31

## 2022-07-24 RX ADMIN — ASPIRIN 325 MG: 325 TABLET, COATED ORAL at 19:31

## 2022-07-24 RX ADMIN — SODIUM CHLORIDE, PRESERVATIVE FREE 10 ML: 5 INJECTION INTRAVENOUS at 06:47

## 2022-07-24 RX ADMIN — SODIUM CHLORIDE 25 ML: 9 INJECTION, SOLUTION INTRAVENOUS at 08:04

## 2022-07-24 RX ADMIN — ACETAMINOPHEN 325MG 650 MG: 325 TABLET ORAL at 01:35

## 2022-07-24 ASSESSMENT — PAIN DESCRIPTION - DESCRIPTORS
DESCRIPTORS: ACHING;DISCOMFORT;GNAWING
DESCRIPTORS: ACHING;DISCOMFORT
DESCRIPTORS: ACHING;DISCOMFORT

## 2022-07-24 ASSESSMENT — PAIN SCALES - GENERAL
PAINLEVEL_OUTOF10: 0
PAINLEVEL_OUTOF10: 6
PAINLEVEL_OUTOF10: 7
PAINLEVEL_OUTOF10: 7
PAINLEVEL_OUTOF10: 0

## 2022-07-24 ASSESSMENT — PAIN DESCRIPTION - LOCATION
LOCATION: KNEE
LOCATION: KNEE
LOCATION: LEG

## 2022-07-24 ASSESSMENT — PAIN DESCRIPTION - ORIENTATION
ORIENTATION: RIGHT

## 2022-07-24 ASSESSMENT — PAIN - FUNCTIONAL ASSESSMENT
PAIN_FUNCTIONAL_ASSESSMENT: PREVENTS OR INTERFERES SOME ACTIVE ACTIVITIES AND ADLS

## 2022-07-24 NOTE — PLAN OF CARE
pressure, respiratory therapy assesses nares and determine need for appliance change or resting period  Goal: Incisions, wounds, or drain sites healing without S/S of infection  7/23/2022 2346 by Sabine Odonnell RN  Outcome: Progressing  7/23/2022 1044 by Heather Draper RN  Outcome: Progressing  Flowsheets (Taken 7/23/2022 0028 by Sabine Odonnell RN)  Incisions, Wounds, or Drain Sites Healing Without Sign and Symptoms of Infection:   TWICE DAILY: Assess and document skin integrity   TWICE DAILY: Assess and document dressing/incision, wound bed, drain sites and surrounding tissue   Implement wound care per orders   Initiate isolation precautions as appropriate   Initiate pressure ulcer prevention bundle as indicated  Goal: Oral mucous membranes remain intact  7/23/2022 2346 by Sabine Odonnell RN  Outcome: Progressing  7/23/2022 1044 by Heather Draper RN  Outcome: Progressing  Flowsheets (Taken 7/23/2022 0028 by Sabine Odonnell RN)  Oral Mucous Membranes Remain Intact:   Assess oral mucosa and hygiene practices   Implement oral medicated treatments as ordered   Implement preventative oral hygiene regimen     Problem: Musculoskeletal - Adult  Goal: Return mobility to safest level of function  7/23/2022 2346 by Sabine Odonnell RN  Outcome: Progressing  7/23/2022 1044 by Heather Draper RN  Outcome: Progressing  Goal: Maintain proper alignment of affected body part  7/23/2022 2346 by Sabine Odonnell RN  Outcome: Progressing  7/23/2022 1044 by Heather Draper RN  Outcome: Progressing  Goal: Return ADL status to a safe level of function  7/23/2022 2346 by Sabine Odonnell RN  Outcome: Progressing  7/23/2022 1044 by Heather Draper RN  Outcome: Progressing     Problem: Metabolic/Fluid and Electrolytes - Adult  Goal: Electrolytes maintained within normal limits  7/23/2022 2346 by Sabine Odonnell RN  Outcome: Progressing  7/23/2022 1044 by Heather Draper RN  Outcome: Progressing  Goal: Hemodynamic stability and optimal renal function maintained  7/23/2022 2346 by Eileen Singh

## 2022-07-24 NOTE — PROGRESS NOTES
Subjective:     Post-Operative Day: 3 No complaints    Objective:     Patient Vitals for the past 24 hrs:   BP Temp Temp src Pulse Resp SpO2   07/24/22 0644 -- -- -- -- 16 --   07/24/22 0634 (!) 141/62 98.1 °F (36.7 °C) Temporal 89 16 96 %   07/24/22 0030 -- -- -- -- 16 --   07/24/22 0018 129/63 97.3 °F (36.3 °C) Temporal 90 16 95 %   07/23/22 2354 -- -- -- -- 16 --   07/23/22 2015 -- -- -- -- 16 --   07/23/22 1943 -- -- -- -- 16 --   07/23/22 1755 (!) 135/52 97.5 °F (36.4 °C) Temporal 91 16 96 %   07/23/22 1432 (!) 129/51 96.8 °F (36 °C) Temporal 91 16 96 %   07/23/22 1337 -- -- -- -- 18 --         General: Alert cooperative   Wound: Clean dry intact. Moderate swelling   Neurovascular: Exam normal   DVT Exam: No evidence of DVT         Data Review:  Recent Labs     07/23/22  0341 07/24/22  0259   HGB 8.2* 7.7*       Recent Labs     07/22/22  0311   *   K 4.5   CREATININE 1.2*   GLUCOSE 190*       Recent Labs     07/21/22  1303 07/24/22  0723   POCGLU 96 114*       XR KNEE RIGHT (1-2 VIEWS)   Final Result   Status post right knee arthroplasty. Surgically related subcutaneous emphysema, surgical drain, and swelling. Recommendation:   Follow up recommended. Electronically Signed by Alejandra Brown MD at 22-Jul-2022 01:11:34 AM                   Cx neg x 5 to date  Assessment:     Status Post right Total Knee Arthroplasty. Doing well postop without complication.    Plan:     Follow cultures  Pic line  IV antibiotics (Vanco/Cefepime)   Pain control  Tight blood glucose control  PT/OT  DVT prophylaxis  Ice and elevate  Discharge Home tomorrow after pic line

## 2022-07-24 NOTE — PROGRESS NOTES
Physical Therapy  Anh Mercado  [id number]     07/24/22 1130   Subjective   Subjective Agrees to work with therapy. Bed Mobility   Supine to Sit Contact guard assistance   Sit to Supine Contact guard assistance   Transfers   Sit to Stand Contact guard assistance   Stand to sit Contact guard assistance   Ambulation   Surface level tile   Device Rolling Walker   Assistance Contact guard assistance   Distance 40'   Other Activities   Comment Patient did well with therapy. Patient tolerated increased gait distance this treatment. Assisted patient to/from the BR. Patient able to perform self care. Fresh ice pack placed on R knee, all needs in reach and spouse present. Short Term Goals   Time Frame for Short term goals 2 wks   Short term goal 1 amb 100 ft with rw, CGA   Activity Tolerance   Activity Tolerance Patient tolerated treatment well   PT Plan of Care   Sunday X   Safety Devices   Type of Devices Call light within reach; Left in bed   Electronically signed by [employer], PTA on 7/24/2022 at 11:36 AM

## 2022-07-24 NOTE — PROGRESS NOTES
Infectious Diseases Progress Note    Patient:  Ja Ridley  YOB: 1950  MRN: [id number]   Admit date: 7/21/2022   Admitting Physician: Berenice Zavaleta [facility],*  Primary Care Physician: Kris Bailey MD    Chief Complaint/Interval History:  She is without fever. Vital signs stable. Drain has been removed. No drainage from the incision. Operative cultures remain no growth.  and son at bedside. Discussed plan for empiric antibiotic treatment based on clinical Ddagnosis of prosthetic joint septic arthritis. Explained with negative cultures, we will need to choose antibiotic treatment empirically. Discussed PICC line placement along with risks of PICC line placement. She is agreeable.  is familiar with PICC line's as he indicates he has had 1 previously. In/Out    Intake/Output Summary (Last 24 hours) at 7/24/2022 1514  Last data filed at 7/24/2022 1345  Gross per 24 hour   Intake 750 ml   Output 900 ml   Net -150 ml     Allergies: No Known Allergies  Current Meds: vancomycin (VANCOCIN) 1000 mg in dextrose 5% 250 mL IVPB, Q24H  sodium chloride flush 0.9 % injection 5-40 mL, 2 times per day  sodium chloride flush 0.9 % injection 5-40 mL, PRN  0.9 % sodium chloride infusion, PRN  acetaminophen (TYLENOL) tablet 650 mg, Q6H  oxyCODONE (ROXICODONE) immediate release tablet 5 mg, Q4H PRN   Or  oxyCODONE (ROXICODONE) immediate release tablet 10 mg, Q4H PRN  morphine (PF) injection 2 mg, Q1H PRN   Or  morphine sulfate (PF) injection 4 mg, Q1H PRN  aspirin EC tablet 325 mg, BID  lidocaine PF 1 % injection 5 mL, Once  vancomycin (VANCOCIN) intermittent dosing (placeholder), RX Placeholder      Review of Systems No diarrhea, rash, skin itching.       VitalSigns:  BP (!) 120/51   Pulse 86   Temp 97.4 °F (36.3 °C)   Resp 16   Ht 5' 6\" (1.676 m)   Wt 190 lb (86.2 kg)   SpO2 97%   BMI 30.67 kg/m²      Physical Exam  Line/IV (Peripheral)site: No erythema, warmth, induration, or tenderness. Right knee incision without significant erythema  Mild soft tissue swelling in the region of the right knee. Does not appear to have any significant effusion  Right knee without drainage    Lab Results:  CBC:   Recent Labs     07/22/22  0311 07/23/22  0341 07/24/22  0259   HGB 9.0* 8.2* 7.7*     BMP:  Recent Labs     07/22/22  0311   *   K 4.5      CO2 26   BUN 28*   CREATININE 1.2*   GLUCOSE 190*     CultureResults:  Surgical cultures right knee July 21, 2022-no growth    Radiology: None    Additional Studies Reviewed:  None    Impression:  1. Presumptive right knee prosthetic joint infection-she underwent explant on July 21, 2022. Operative cultures no growth. 2.  Mild renal insufficiency  3. Diabetes mellitus    Recommendations:  Going to treat for presumptive right knee prosthetic joint septic arthritis  Recommend 6 weeks of IV antibiotic treatment  Will choose vancomycin and ceftriaxone empirically as operative cultures no growth  PICC line placement-patient agreeable   evaluation to assist with home IV antibiotic arrangements  See home IV antibiotic orders below    Home IV antibiotic orders:  1. Diagnosis-right knee prosthetic joint infection. She underwent explant and placement of antibiotic impregnated spacer on July 21, 2022. Microbiology uncertain-operative cultures no growth  2. Magalys Brower MD  3. IV access-PICC line  4. Antibiotic order:  Vancomycin 1000 mg IV daily  Ceftriaxone 2 g IV daily  Last doses of vancomycin and ceftriaxone on September 2, 2022 (6 weeks following explant)  5. Laboratory monitoring:  CMP, CBC, CRP, vancomycin trough every Monday or Tuesday  Dose vancomycin using area under the curve approach with goal area under the curve of 400-600  6.   Follow-up appointment 2 weeks after hospital discharge    Chetna Zheng MD

## 2022-07-25 VITALS
SYSTOLIC BLOOD PRESSURE: 148 MMHG | OXYGEN SATURATION: 97 % | DIASTOLIC BLOOD PRESSURE: 78 MMHG | TEMPERATURE: 97.4 F | RESPIRATION RATE: 16 BRPM | HEART RATE: 64 BPM | BODY MASS INDEX: 30.53 KG/M2 | WEIGHT: 190 LBS | HEIGHT: 66 IN

## 2022-07-25 LAB
ALBUMIN SERPL-MCNC: 3 G/DL (ref 3.5–5.2)
ALP BLD-CCNC: 98 U/L (ref 35–104)
ALT SERPL-CCNC: <5 U/L (ref 5–33)
ANAEROBIC CULTURE: NORMAL
ANION GAP SERPL CALCULATED.3IONS-SCNC: 9 MMOL/L (ref 7–19)
AST SERPL-CCNC: 11 U/L (ref 5–32)
BASOPHILS ABSOLUTE: 0 K/UL (ref 0–0.2)
BASOPHILS RELATIVE PERCENT: 0.6 % (ref 0–1)
BILIRUB SERPL-MCNC: 0.3 MG/DL (ref 0.2–1.2)
BUN BLDV-MCNC: 24 MG/DL (ref 8–23)
C-REACTIVE PROTEIN: 6.56 MG/DL (ref 0–0.5)
CALCIUM SERPL-MCNC: 9.7 MG/DL (ref 8.8–10.2)
CHLORIDE BLD-SCNC: 101 MMOL/L (ref 98–111)
CO2: 28 MMOL/L (ref 22–29)
CREAT SERPL-MCNC: 1.1 MG/DL (ref 0.5–0.9)
CULTURE SURGICAL: NORMAL
EOSINOPHILS ABSOLUTE: 0.5 K/UL (ref 0–0.6)
EOSINOPHILS RELATIVE PERCENT: 7.2 % (ref 0–5)
GFR AFRICAN AMERICAN: >59
GFR NON-AFRICAN AMERICAN: 49
GLUCOSE BLD-MCNC: 113 MG/DL (ref 74–109)
GRAM STAIN RESULT: NORMAL
HCT VFR BLD CALC: 28.1 % (ref 37–47)
HEMOGLOBIN: 8.5 G/DL (ref 12–16)
IMMATURE GRANULOCYTES #: 0 K/UL
LYMPHOCYTES ABSOLUTE: 1.5 K/UL (ref 1.1–4.5)
LYMPHOCYTES RELATIVE PERCENT: 24.6 % (ref 20–40)
MCH RBC QN AUTO: 27.6 PG (ref 27–31)
MCHC RBC AUTO-ENTMCNC: 30.2 G/DL (ref 33–37)
MCV RBC AUTO: 91.2 FL (ref 81–99)
MONOCYTES ABSOLUTE: 0.7 K/UL (ref 0–0.9)
MONOCYTES RELATIVE PERCENT: 11.4 % (ref 0–10)
NEUTROPHILS ABSOLUTE: 3.5 K/UL (ref 1.5–7.5)
NEUTROPHILS RELATIVE PERCENT: 55.7 % (ref 50–65)
PDW BLD-RTO: 13.2 % (ref 11.5–14.5)
PLATELET # BLD: 245 K/UL (ref 130–400)
PMV BLD AUTO: 9.1 FL (ref 9.4–12.3)
POTASSIUM SERPL-SCNC: 4.3 MMOL/L (ref 3.5–5)
RBC # BLD: 3.08 M/UL (ref 4.2–5.4)
SODIUM BLD-SCNC: 138 MMOL/L (ref 136–145)
TOTAL PROTEIN: 5.8 G/DL (ref 6.6–8.7)
VANCOMYCIN TROUGH: 15 UG/ML (ref 10–20)
WBC # BLD: 6.3 K/UL (ref 4.8–10.8)

## 2022-07-25 PROCEDURE — 6370000000 HC RX 637 (ALT 250 FOR IP): Performed by: ORTHOPAEDIC SURGERY

## 2022-07-25 PROCEDURE — C1751 CATH, INF, PER/CENT/MIDLINE: HCPCS

## 2022-07-25 PROCEDURE — 6360000002 HC RX W HCPCS: Performed by: INTERNAL MEDICINE

## 2022-07-25 PROCEDURE — 76937 US GUIDE VASCULAR ACCESS: CPT

## 2022-07-25 PROCEDURE — 86140 C-REACTIVE PROTEIN: CPT

## 2022-07-25 PROCEDURE — 36569 INSJ PICC 5 YR+ W/O IMAGING: CPT

## 2022-07-25 PROCEDURE — 97530 THERAPEUTIC ACTIVITIES: CPT

## 2022-07-25 PROCEDURE — 02HV33Z INSERTION OF INFUSION DEVICE INTO SUPERIOR VENA CAVA, PERCUTANEOUS APPROACH: ICD-10-PCS | Performed by: ORTHOPAEDIC SURGERY

## 2022-07-25 PROCEDURE — 36415 COLL VENOUS BLD VENIPUNCTURE: CPT

## 2022-07-25 PROCEDURE — 97116 GAIT TRAINING THERAPY: CPT

## 2022-07-25 PROCEDURE — 2580000003 HC RX 258: Performed by: INTERNAL MEDICINE

## 2022-07-25 PROCEDURE — 97535 SELF CARE MNGMENT TRAINING: CPT

## 2022-07-25 PROCEDURE — 2580000003 HC RX 258: Performed by: ORTHOPAEDIC SURGERY

## 2022-07-25 PROCEDURE — 80202 ASSAY OF VANCOMYCIN: CPT

## 2022-07-25 PROCEDURE — 6370000000 HC RX 637 (ALT 250 FOR IP): Performed by: INTERNAL MEDICINE

## 2022-07-25 PROCEDURE — 85025 COMPLETE CBC W/AUTO DIFF WBC: CPT

## 2022-07-25 PROCEDURE — 80053 COMPREHEN METABOLIC PANEL: CPT

## 2022-07-25 RX ORDER — OXYCODONE HYDROCHLORIDE 5 MG/1
5 TABLET ORAL EVERY 4 HOURS PRN
Qty: 30 TABLET | Refills: 0 | Status: SHIPPED | OUTPATIENT
Start: 2022-07-25 | End: 2022-07-28

## 2022-07-25 RX ORDER — LORAZEPAM 1 MG/1
1 TABLET ORAL ONCE
Status: COMPLETED | OUTPATIENT
Start: 2022-07-25 | End: 2022-07-25

## 2022-07-25 RX ORDER — ASPIRIN 81 MG/1
81 TABLET ORAL 2 TIMES DAILY
Qty: 60 TABLET | Refills: 0 | Status: SHIPPED | OUTPATIENT
Start: 2022-07-25

## 2022-07-25 RX ADMIN — LORAZEPAM 1 MG: 1 TABLET ORAL at 08:09

## 2022-07-25 RX ADMIN — SODIUM CHLORIDE, PRESERVATIVE FREE 10 ML: 5 INJECTION INTRAVENOUS at 08:28

## 2022-07-25 RX ADMIN — ASPIRIN 325 MG: 325 TABLET, COATED ORAL at 08:10

## 2022-07-25 RX ADMIN — Medication 1000 MG: at 08:16

## 2022-07-25 RX ADMIN — ACETAMINOPHEN 325MG 650 MG: 325 TABLET ORAL at 08:09

## 2022-07-25 RX ADMIN — WATER 2000 MG: 1 INJECTION INTRAMUSCULAR; INTRAVENOUS; SUBCUTANEOUS at 01:26

## 2022-07-25 RX ADMIN — OXYCODONE 10 MG: 5 TABLET ORAL at 08:09

## 2022-07-25 RX ADMIN — WATER 2000 MG: 1 INJECTION INTRAMUSCULAR; INTRAVENOUS; SUBCUTANEOUS at 15:43

## 2022-07-25 RX ADMIN — OXYCODONE 10 MG: 5 TABLET ORAL at 01:26

## 2022-07-25 RX ADMIN — ACETAMINOPHEN 325MG 650 MG: 325 TABLET ORAL at 14:04

## 2022-07-25 RX ADMIN — ACETAMINOPHEN 325MG 650 MG: 325 TABLET ORAL at 01:26

## 2022-07-25 ASSESSMENT — PAIN DESCRIPTION - DESCRIPTORS
DESCRIPTORS: ACHING
DESCRIPTORS: ACHING;DISCOMFORT;GNAWING
DESCRIPTORS: TIGHTNESS;SQUEEZING

## 2022-07-25 ASSESSMENT — PAIN DESCRIPTION - LOCATION
LOCATION: KNEE

## 2022-07-25 ASSESSMENT — PAIN DESCRIPTION - ORIENTATION
ORIENTATION: RIGHT

## 2022-07-25 ASSESSMENT — PAIN SCALES - GENERAL
PAINLEVEL_OUTOF10: 6
PAINLEVEL_OUTOF10: 7
PAINLEVEL_OUTOF10: 0
PAINLEVEL_OUTOF10: 0
PAINLEVEL_OUTOF10: 8
PAINLEVEL_OUTOF10: 7

## 2022-07-25 ASSESSMENT — PAIN - FUNCTIONAL ASSESSMENT: PAIN_FUNCTIONAL_ASSESSMENT: PREVENTS OR INTERFERES SOME ACTIVE ACTIVITIES AND ADLS

## 2022-07-25 NOTE — PROGRESS NOTES
Subjective:     Post-Operative Day: 4 No complaints    Objective:     Patient Vitals for the past 24 hrs:   BP Temp Pulse Resp SpO2   07/25/22 0544 (!) 131/52 97.2 °F (36.2 °C) 83 18 96 %   07/25/22 0200 -- -- -- 16 --   07/25/22 0126 -- -- -- 16 --   07/25/22 0037 (!) 119/53 97.5 °F (36.4 °C) 95 18 95 %   07/24/22 2001 -- -- -- 16 --   07/24/22 1931 -- -- -- 16 --   07/24/22 1815 134/60 97.6 °F (36.4 °C) 90 16 98 %         General: Alert cooperative   Wound: Clean dry intact. Moderate swelling   Neurovascular: Exam normal   DVT Exam: No evidence of DVT         Data Review:  Recent Labs     07/24/22  0259 07/25/22  0711   HGB 7.7* 8.5*       Recent Labs     07/25/22  0711      K 4.3   CREATININE 1.1*   GLUCOSE 113*       Recent Labs     07/24/22  0723   POCGLU 114*           Cx neg x 5 to date  Assessment:     Status Post right Total Knee Arthroplasty. Doing well postop without complication.    Plan:       Pic line  IV antibiotics (Vanco/Cefepime)   Pain control  Tight blood glucose control  PT/OT  DVT prophylaxis  Ice and elevate  Discharge Home after pic line

## 2022-07-25 NOTE — PROGRESS NOTES
Physical Therapy  Name: Shereen Motley  MRN:  [id number]  Date of service:  7/25/2022 07/25/22 1146   Restrictions/Precautions   Restrictions/Precautions Weight Bearing   Lower Extremity Weight Bearing Restrictions   Right Lower Extremity Weight Bearing Weight Bearing As Tolerated   Subjective   Subjective Pt agrees to work with therapy. States that she has had anxiety this a.m. Pain Assessment   Pain Assessment 0-10   Pain Level 7   Pain Location Knee   Pain Orientation Right   Pain Descriptors Aching   Bed Mobility   Supine to Sit Contact guard assistance   Sit to Supine Contact guard assistance   Transfers   Sit to Stand Contact guard assistance;Minimal Assistance   Stand to sit Contact guard assistance   Comment pt able to stand from bedside with CGA, toilet with Onesimo   Ambulation   Surface level tile   Device Rolling Walker   Assistance Contact guard assistance   Quality of Gait flexed posture, antalgic   Gait Deviations Slow Minna;Decreased step length;Decreased step height   Distance 50', 10'   Short Term Goals   Time Frame for Short term goals 2 wks   Short term goal 1 amb 100 ft with rw, CGA   Conditions Requiring Skilled Therapeutic Intervention   Body Structures, Functions, Activity Limitations Requiring Skilled Therapeutic Intervention Decreased functional mobility ; Decreased strength;Decreased safe awareness;Decreased ROM; Decreased balance; Increased pain   Assessment Assisted pt to/from the BR, pt tolerated short amb distance well with rwx, slightly antalgic and fwd flexed but no LOB. Will cont to progress as tolerated.    Activity Tolerance   Activity Tolerance Patient tolerated treatment well   PT Plan of Care   Monday X   Safety Devices   Type of Devices Bed alarm in place;Call light within reach;Gait belt;Left in bed         Electronically signed by Juany Sevilla PTA on 7/25/2022 at 11:52 AM

## 2022-07-25 NOTE — PROCEDURES
[facility] Vascular Access Team:  PICC Line Insertion Procedure Note    Eustace Spurling  [id number]   Admitted - 7/21/2022 12:32 PM  Admission Diagnosis -   Infection of total right knee replacement, initial encounter [facility]) Chris Rosales  Failed total knee, right, initial encounter [facility]) [Z22.850C]    Attending Physician - [facility],*  Ordering Physician - John Hernandez,*    Active LDAs -   PICC Single Lumen 69/73/49 Right Basilic (Active)   Number of days: 0       Peripheral IV 07/21/22 Left Antecubital (Active)   Number of days: 3       PROCEDURE: Insertion of 4.5 [place]an Single Lumen PICC    INDICATION(S): Long Term IV therapy, Home IV therapy    MEDICATIONS: Local medication only. PROCEDURE DETAILS:  Informed consent was obtained for the procedure, including sedation. Risks of lung perforation, hemorrhage, and adverse drug reaction were discussed. 4.5 [place]an Single Lumen PICC inserted to the Right Basilic per hospital protocol. Blood return: yes    FINDINGS:  The Right Basilic vein was visualized using the ultrasound and deemed a suitable vessel. The area was prepped with Chlorhexidine and draped in sterile fashion using full max barrier draping. The area was anesthetized with 3 cc's of 1% Lidocaine. The vein was accessed using US guidance. The guidewire was advanced through the needle to secure the vein. The needle was removed and a peel-a-way Sheath was placed over the guidewire. Guidewire was removed with atraumatic tip intact. The 4.5 [place]an Single Lumen PICC was trimmed at 41 cm and inserted into the Sheath. Using VPS technology, the PICC line was advanced until PICC tip was in the SVC. The peel-a-way sheath was removed and PICC was inserted until the CAJ was reached. The PICC was advanced until P wave deflection was captured. The PICC was withdrawn until the optimal P wave amplitude was obtained. Approximately 1 cm exposed.  Internal components of the PICC were removed, all lumens had brisk blood return and was flushed with 10 cc of NS. The PICC was secured using a securement device and a Biopatch was placed over the insertion site. A Tegaderm was placed over the securement device and insertion site. Dressing was dated and initialed with external measurement marked. Patient did tolerate procedure well. IMPRESSION/PLAN:  1. Successful insertion of 4.5 Cypriot Single Lumen PICC  2. PICC Brochure given to patient for care instructions  3. PICC Line is ready to be used. 4. Please change tubing prior to using new PICC line. Make sure to label, date and use alcohol caps on new tubing and alcohol caps on unused ports.              Electronically Signed By: Rachell Moon RN, [facility]-BC on 7/25/2022 at 12:18 PM

## 2022-07-25 NOTE — PROGRESS NOTES
Infectious Diseases Progress Note    Patient:  Daisha Harvey  YOB: 1950  MRN: [id number]   Admit date: 7/21/2022   Admitting Physician: No att. providers found  Primary Care Physician: Haritha Sow MD    Chief Complaint/Interval History: I saw her this morning on rounds approximately 7:00 AM.  Discussed with nursing at that time. Verbal order given for Ativan 1 mg p.o. x1. She indicates she had felt quite anxious. She indicates she had been on medicine at night at home to help with anxiety. Per review of chart she had been on Tranxene. She does not report any chest pain or shortness of breath. She has not had fevers or chills. She is not having any severe discomfort involving the right knee. She wonders whether the new antibiotic contributed to anxiety. I explained Rocephin typically very well-tolerated and I did not think it was playing a role. She indicates she has had anxiety at times in the past, but the Tranxene she takes at night helps. She has not been receiving that in the hospital.  Reviewed her culture results with her. Again explained that they were no growth. She was agreeable to home empiric IV antibiotic therapy. \  Saw her this morning on rounds. She was discharged today. I had not received notification of her discharge. The floor and spoke with the nurse that had her today. She indicates everything fell in place for a PICC line and arrangements for her home IV antibiotic treatment. They adjusted some dosing times for convenience of administration at home. She indicates patient improved following the dose of Ativan. She indicates patient was glad to be discharged home today and felt stable for discharge home. Although I saw her this morning, this note is being completed post discharge.     Intake/Output Summary (Last 24 hours) at 7/25/2022 4757  Last data filed at 7/25/2022 1332  Gross per 24 hour   Intake 480 ml   Output 500 ml   Net -20 ml     Allergies: No Known Allergies  Current Meds: cefTRIAXone (ROCEPHIN) 2,000 mg in sterile water 20 mL IV syringe, Q24H  vancomycin (VANCOCIN) 1000 mg in dextrose 5% 250 mL IVPB, Q24H  sodium chloride flush 0.9 % injection 5-40 mL, 2 times per day  sodium chloride flush 0.9 % injection 5-40 mL, PRN  0.9 % sodium chloride infusion, PRN  acetaminophen (TYLENOL) tablet 650 mg, Q6H  oxyCODONE (ROXICODONE) immediate release tablet 5 mg, Q4H PRN   Or  oxyCODONE (ROXICODONE) immediate release tablet 10 mg, Q4H PRN  morphine (PF) injection 2 mg, Q1H PRN   Or  morphine sulfate (PF) injection 4 mg, Q1H PRN  aspirin EC tablet 325 mg, BID  lidocaine PF 1 % injection 5 mL, Once  vancomycin (VANCOCIN) intermittent dosing (placeholder), RX Placeholder      Review of Systems see HPI    VitalSigns:  BP (!) 148/78   Pulse 64   Temp 97.4 °F (36.3 °C) (Temporal)   Resp 16   Ht 5' 6\" (1.676 m)   Wt 190 lb (86.2 kg)   SpO2 97%   BMI 30.67 kg/m²      Physical Exam  Line/IV (peripheral) site: No erythema, warmth, induration, or tenderness. She appears slightly anxious  She was not dyspneic  She was not uncomfortable appearing  Right knee without erythema or drainage  No significant tenderness to palpation    Lab Results:  CBC:   Recent Labs     07/23/22  0341 07/24/22  0259 07/25/22  0711   WBC  --   --  6.3   HGB 8.2* 7.7* 8.5*   PLT  --   --  245     BMP:  Recent Labs     07/25/22  0711      K 4.3      CO2 28   BUN 24*   CREATININE 1.1*   GLUCOSE 113*     Component Ref Range & Units 7/25/22 0711 5/25/22 1155 5/10/22 1032   CRP 0.00 - 0.50 mg/dL 6.56 High   1.36 High   1.99 High       CultureResults:  Operative cultures from July 21, 2022-no growth    Radiology: None    Additional Studies Reviewed:  None    Impression:  1. Presumptive right knee prosthetic joint infection-she underwent explant on July 21, 2022-operative cultures remain no growth. 2.  Mild renal insufficiency-improved  3. Diabetes mellitus  4.   Anxiety-responded to single dose of Ativan    Recommendations:  Continue treatment for presumptive right knee prosthetic joint infection  Planning IV antibiotic treatment at home as outlined in yesterday's recommendations  Per discussion with nursing the home IV antibiotic arrangements were made and a follow-up was made with me  See the home IV antibiotic orders outlined below that were sent to  and outlined in yesterday's note    Home IV antibiotic orders:  1. Diagnosis-right knee prosthetic joint infection. She underwent explant and placement of antibiotic impregnated spacer on July 21, 2022. Microbiology uncertain-operative cultures no growth  2. Kang Avendano MD  3. IV access-PICC line  4. Antibiotic order:  Vancomycin 1000 mg IV daily  Ceftriaxone 2 g IV daily  Last doses of vancomycin and ceftriaxone on September 2, 2022 (6 weeks following explant)  5. Laboratory monitoring:  CMP, CBC, CRP, vancomycin trough every Monday or Tuesday  Dose vancomycin using area under the curve approach with goal area under the curve of 400-600  6.   Follow-up appointment 2 weeks after hospital discharge    Maxime Parisi MD

## 2022-07-25 NOTE — PROGRESS NOTES
Occupational Therapy  Facility/Department: [facility]/VAS/MED  Daily Treatment Note  NAME: Freddrick Fothergill  : 1950  MRN: [id number]    Date of Service: 2022    Discharge Recommendations:          Assessment   Performance deficits / Impairments: Decreased functional mobility ; Decreased ADL status; Decreased balance  Assessment: Pt tolerated tx well, able to walk in hallway and into BR to complete toileting. Pt continues to benefit from skilled OT services. Treatment Diagnosis: R knee explant and insertion of antibiotic spacers  Prognosis: Good  Activity Tolerance  Activity Tolerance: Patient Tolerated treatment well         Patient Diagnosis(es): The primary encounter diagnosis was Failed total knee, right, initial encounter ([facility] A diagnosis of Infection of total right knee replacement, initial encounter [facility]) was also pertinent to this visit. has a past medical history of Carotid stenosis, Colonic polyp, [facility]di[facility] Essential hypertension, Gastric ulcer, GERD (gastroesophageal reflux disease), Hiatal hernia, Hyperlipidemia, Iron deficiency anemia secondary to blood loss (chronic), OA (osteoarthritis), and Status post dilatation of esophageal stricture. has a past surgical history that includes Knee Arthroplasty (Right); Ankle surgery (Left); Colonoscopy (2018); and Upper gastrointestinal endoscopy (2018).     Restrictions  Restrictions/Precautions  Restrictions/Precautions: Weight Bearing  Lower Extremity Weight Bearing Restrictions  Right Lower Extremity Weight Bearing: Weight Bearing As Tolerated  Subjective   General  Chart Reviewed: Yes  Patient assessed for rehabilitation services?: Yes  Response to previous treatment: Patient with no complaints from previous session  Family / Caregiver Present: Yes (spouse)  Diagnosis: R knee explant and insertion of antibiotic spacer  Pre Treatment Pain Screening  Pain at present: 7  Scale Used: Numeric Score  Intervention List: Patient able to continue with treatment  Comments / Details: R knee   Orientation     Objective    ADL  UE Dressing: Supervision  LE Dressing: Stand by assistance;Contact guard assistance  Toileting: Stand by assistance;Contact guard assistance                                                                          Plan   Plan  Times per Week: 3-5x/week  Times per Day: Daily  Goals  Short Term Goals  Time Frame for Short term goals: 1 week  Short Term Goal 1: Supervision with toilet tfers  Short Term Goal 2: Supervision with clothing mgmt in standing  Short Term Goal 3: Supervision with seated LB dsg  Long Term Goals  Long Term Goal 1: Return to PLOF       Therapy Time   Individual Concurrent Group Co-treatment   Time In           Time Out           Minutes              SHA Bahena  Electronically signed by SHA Bahena on 7/25/2022 at 11:57 AM

## 2022-07-25 NOTE — CARE COORDINATION
Spoke with pt and spouse in room about iv abx orders, the order/info was faxed to [facility] for pricing. Picc line is in process of being placed. Will follow once pricing for abx completed.   Electronically signed by Kathy Lilly RN on 7/25/2022 at 11:45 AM

## 2022-07-25 NOTE — CARE COORDINATION
Pt getting all abx required for today per nurse. Patsy Mccormick will be delivered to pts house tomorrow at noon and [facility] will be at the house for teaching. Family agreeable.   Electronically signed by Inez Sandhoff, RN on 7/25/2022 at 3:55 PM

## 2022-07-25 NOTE — PLAN OF CARE
Problem: Discharge Planning  Goal: Discharge to home or other facility with appropriate resources  Outcome: Progressing     Problem: Chronic Conditions and Co-morbidities  Goal: Patient's chronic conditions and co-morbidity symptoms are monitored and maintained or improved  Outcome: Progressing     Problem: Safety - Adult  Goal: Free from fall injury  Outcome: Progressing     Problem: ABCDS Injury Assessment  Goal: Absence of physical injury  Outcome: Progressing     Problem: Pain  Goal: Verbalizes/displays adequate comfort level or baseline comfort level  Outcome: Progressing     Problem: Skin/Tissue Integrity - Adult  Goal: Skin integrity remains intact  Outcome: Progressing  Goal: Incisions, wounds, or drain sites healing without S/S of infection  Outcome: Progressing  Goal: Oral mucous membranes remain intact  Outcome: Progressing     Problem: Musculoskeletal - Adult  Goal: Return mobility to safest level of function  Outcome: Progressing  Goal: Maintain proper alignment of affected body part  Outcome: Progressing  Goal: Return ADL status to a safe level of function  Outcome: Progressing     Problem: Metabolic/Fluid and Electrolytes - Adult  Goal: Electrolytes maintained within normal limits  Outcome: Progressing  Goal: Hemodynamic stability and optimal renal function maintained  Outcome: Progressing  Goal: Glucose maintained within prescribed range  Outcome: Progressing     Problem: Hematologic - Adult  Goal: Maintains hematologic stability  Outcome: Progressing

## 2022-07-25 NOTE — PROGRESS NOTES
[address] [facility] Pharmacokinetic Monitoring Service - Vancomycin    Consulting Provider: Dr. Kathia Maya   Indication: Bone and joint infection  Target Concentration: Goal AUC/GODWIN 400-600 mg*hr/L  Day of Therapy: 5  Additional Antimicrobials: ceftriaxone    Pertinent Laboratory Values: Wt Readings from Last 1 Encounters:   07/21/22 190 lb (86.2 kg)     Temp Readings from Last 1 Encounters:   07/25/22 97.2 °F (36.2 °C)     Estimated Creatinine Clearance: 52 mL/min (A) (based on SCr of 1.1 mg/dL (H)). Recent Labs     07/25/22  0711   CREATININE 1.1*   WBC 6.3     Procalcitonin: No level    Pertinent Cultures:  Culture Date Source Results   07/21/22 Surgical No growth to date   MRSA Nasal Swab: N/A. Non-respiratory infection.     Recent vancomycin administrations                     vancomycin (VANCOCIN) 1000 mg in dextrose 5% 250 mL IVPB (mg) 1,000 mg New Bag 07/24/22 0804     1,000 mg New Bag 07/23/22 0833    vancomycin (VANCOCIN) 750 mg in dextrose 5 % 250 mL IVPB (mg) 750 mg New Bag 07/22/22 1600                    Assessment:  Date/Time Current Dose Concentration Timing of Concentration (h) AUC   07/25/22 @ 0711 1000mg IV q 24 hours 15 23h 7m 473   Note: Serum concentrations collected for AUC dosing may appear elevated if collected in close proximity to the dose administered, this is not necessarily an indication of toxicity    Plan:  Current dosing regimen is therapeutic  Continue current dose  Repeat vancomycin concentration not ordered   Pharmacy will continue to monitor patient and adjust therapy as indicated    Thank you for the consult,    DARION PAIGE, PHARM D, 7/25/2022, 8:07 AM

## 2022-07-25 NOTE — CARE COORDINATION
Per Millie Richter at [facility], the meds and supplies are covered at 100%. Millie Richter was notifiing the family. [facility] will coordinate time to get meds delivered. Will follow.   Electronically signed by Prince Fatuma RN on 2022 at 3:14 PM

## 2022-07-25 NOTE — DISCHARGE SUMMARY
[facility] [place] [address]  Dr. Marlen Hart  Discharge Summary    The Anh Mercado is a 71 y.o. female underwent explant and antibiotic spacer placement for infected total knee replacement procedure without complication. Anh Mercado was admitted to the floor following her   recovery in the PACU. 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    Current Inpatient Medications    Current Facility-Administered Medications: cefTRIAXone (ROCEPHIN) 2,000 mg in sterile water 20 mL IV syringe, 2,000 mg, IntraVENous, Q24H  vancomycin (VANCOCIN) 1000 mg in dextrose 5% 250 mL IVPB, 1,000 mg, IntraVENous, Q24H  sodium chloride flush 0.9 % injection 5-40 mL, 5-40 mL, IntraVENous, 2 times per day  sodium chloride flush 0.9 % injection 5-40 mL, 5-40 mL, IntraVENous, PRN  0.9 % sodium chloride infusion, , IntraVENous, PRN  acetaminophen (TYLENOL) tablet 650 mg, 650 mg, Oral, Q6H  oxyCODONE (ROXICODONE) immediate release tablet 5 mg, 5 mg, Oral, Q4H PRN **OR** oxyCODONE (ROXICODONE) immediate release tablet 10 mg, 10 mg, Oral, Q4H PRN  morphine (PF) injection 2 mg, 2 mg, IntraVENous, Q1H PRN **OR** morphine sulfate (PF) injection 4 mg, 4 mg, IntraVENous, Q1H PRN  aspirin EC tablet 325 mg, 325 mg, Oral, BID  lidocaine PF 1 % injection 5 mL, 5 mL, IntraDERmal, Once  vancomycin (VANCOCIN) intermittent dosing (placeholder), , Other, RX Placeholder    Post-operatively the patients diet was advanced as tolerated and their incision was checked on POD #1. The incision was clean, dry and intact with no signs of infection. The patient remained neurovascularly intact in the lower extremity and had intact pulses distally. Patients calf remained soft and showed no evidence of DVT. The patient was able to move their leg and ankle/foot without any problems post-operatively. Physical therapy and occupational therapy were consulted and began working with the patient post-operatively. The patient progressed with PT/OT as would be expected and continued to improve through their stay. The patients pain was initially controlled with IV medications but we were able to transition to oral pain medications soon after arrival to the floor and their pain remained under good control through their hospital stay. From a medical standpoint the patient remained stable and continued to have the medicine team follow throughout their stay. The patients dressing was changed/incison was checked on day of d/c. The patient will be discharged at this time to  Home   with their current diet restrictions and will continue to follow the total knee precautions outlined to them by us and PT/OT. Seen by ID. All 5 cultures negative at discharge. Will treat empirically with vanco/cefepime IV x 6 weeks. Condition on Discharge: Stable    Plan  Followup at scheduled appointment time (1 month post-op). Patient was instructed on the use of pain medications, the signs and symptoms of infection, and was given our number to call should they have any questions or concerns following discharge.

## 2022-07-25 NOTE — PROGRESS NOTES
CLINICAL PHARMACY NOTE: MEDS TO BEDS    Total # of Prescriptions Filled: 1   The following medications were delivered to the patient:  Oxycodone 5 mg    Additional Documentation:    Handed scripts to patients  and patient was alert as well in the bed.  Paid with [employer]

## 2022-08-01 ENCOUNTER — TELEPHONE (OUTPATIENT)
Dept: INPATIENT UNIT | Age: 72
End: 2022-08-01

## 2022-08-01 NOTE — TELEPHONE ENCOUNTER
Follow up phone call x 1 attempt. No one answered and no mailbox.   Electronically signed by Rosalina Zhang RN on 8/1/2022 at 11:15 AM

## 2022-08-23 LAB
FUNGUS (MYCOLOGY) CULTURE: NORMAL
KOH PREP: NORMAL

## 2022-09-14 LAB
ALBUMIN SERPL-MCNC: 4.3 G/DL (ref 3.5–5.2)
ALP BLD-CCNC: 119 U/L (ref 35–104)
ALT SERPL-CCNC: 18 U/L (ref 5–33)
ANION GAP SERPL CALCULATED.3IONS-SCNC: 14 MMOL/L (ref 7–19)
AST SERPL-CCNC: 23 U/L (ref 5–32)
BACTERIA: NEGATIVE /HPF
BILIRUB SERPL-MCNC: 0.4 MG/DL (ref 0.2–1.2)
BILIRUBIN URINE: NEGATIVE
BLOOD, URINE: NEGATIVE
BUN BLDV-MCNC: 20 MG/DL (ref 8–23)
CALCIUM SERPL-MCNC: 11.6 MG/DL (ref 8.8–10.2)
CHLORIDE BLD-SCNC: 100 MMOL/L (ref 98–111)
CLARITY: CLEAR
CO2: 24 MMOL/L (ref 22–29)
COLOR: YELLOW
CREAT SERPL-MCNC: 1 MG/DL (ref 0.5–0.9)
CREATININE URINE: 40.2 MG/DL (ref 4.2–622)
CRYSTALS, UA: ABNORMAL /HPF
EPITHELIAL CELLS, UA: 5 /HPF (ref 0–5)
GFR AFRICAN AMERICAN: >59
GFR NON-AFRICAN AMERICAN: 55
GLUCOSE BLD-MCNC: 98 MG/DL (ref 74–109)
GLUCOSE URINE: NEGATIVE MG/DL
HYALINE CASTS: 3 /HPF (ref 0–8)
KETONES, URINE: NEGATIVE MG/DL
LEUKOCYTE ESTERASE, URINE: ABNORMAL
MAGNESIUM: 1.5 MG/DL (ref 1.6–2.4)
NITRITE, URINE: NEGATIVE
PH UA: 5.5 (ref 5–8)
PHOSPHORUS: 2.5 MG/DL (ref 2.5–4.5)
POTASSIUM SERPL-SCNC: 3.9 MMOL/L (ref 3.5–5)
PROTEIN PROTEIN: 17 MG/DL (ref 15–45)
PROTEIN UA: ABNORMAL MG/DL
RBC UA: 1 /HPF (ref 0–4)
SODIUM BLD-SCNC: 138 MMOL/L (ref 136–145)
SODIUM URINE: 55 MMOL/L
SPECIFIC GRAVITY UA: 1.01 (ref 1–1.03)
T3 FREE: 3 PG/ML (ref 2–4.4)
T4 FREE: 1.56 NG/DL (ref 0.93–1.7)
TOTAL PROTEIN: 6.7 G/DL (ref 6.6–8.7)
TSH SERPL DL<=0.05 MIU/L-ACNC: 1.36 UIU/ML (ref 0.27–4.2)
UROBILINOGEN, URINE: 0.2 E.U./DL
WBC UA: 4 /HPF (ref 0–5)

## 2022-09-16 LAB
ANGIOTENSIN CONVERTING ENZYME: 65 U/L (ref 16–85)
KAPPA FREE LIGHT CHAINS QNT: 40.54 MG/L (ref 3.3–19.4)
KAPPA/LAMBDA FREE LIGHT CHAIN RATIO: 1.45 (ref 0.26–1.65)
LAMBDA FREE LIGHT CHAINS QNT: 27.99 MG/L (ref 5.71–26.3)

## 2022-09-18 LAB
ALBUMIN SERPL-MCNC: 3.79 G/DL (ref 3.75–5.01)
ALPHA-1-GLOBULIN: 0.48 G/DL (ref 0.19–0.46)
ALPHA-2-GLOBULIN: 0.88 G/DL (ref 0.48–1.05)
BETA GLOBULIN: 0.71 G/DL (ref 0.48–1.1)
GAMMA GLOBULIN: 0.74 G/DL (ref 0.62–1.51)
IMMUNOFIXATION REFLEX: ABNORMAL
SPE/IFE INTERPRETATION: ABNORMAL
TOTAL PROTEIN: 6.6 G/DL (ref 6.3–8.2)

## 2022-09-20 ENCOUNTER — APPOINTMENT (OUTPATIENT)
Dept: GENERAL RADIOLOGY | Age: 72
DRG: 641 | End: 2022-09-20
Payer: MEDICARE

## 2022-09-20 ENCOUNTER — HOSPITAL ENCOUNTER (INPATIENT)
Age: 72
LOS: 3 days | Discharge: HOME | DRG: 641 | End: 2022-09-23
Attending: EMERGENCY MEDICINE | Admitting: HOSPITALIST
Payer: MEDICARE

## 2022-09-20 DIAGNOSIS — E83.52 HYPERCALCEMIA: Primary | ICD-10-CM

## 2022-09-20 DIAGNOSIS — N18.9 CHRONIC KIDNEY DISEASE, UNSPECIFIED CKD STAGE: ICD-10-CM

## 2022-09-20 PROBLEM — M19.90 OA (OSTEOARTHRITIS): Status: ACTIVE | Noted: 2022-09-20

## 2022-09-20 PROBLEM — K63.5 COLONIC POLYP: Status: ACTIVE | Noted: 2022-09-20

## 2022-09-20 PROBLEM — K44.9 HIATAL HERNIA: Status: ACTIVE | Noted: 2022-09-20

## 2022-09-20 PROBLEM — I65.29 CAROTID STENOSIS: Status: ACTIVE | Noted: 2022-09-20

## 2022-09-20 PROBLEM — I10 ESSENTIAL HYPERTENSION: Status: ACTIVE | Noted: 2022-09-20

## 2022-09-20 PROBLEM — Z98.890 STATUS POST DILATATION OF ESOPHAGEAL STRICTURE: Status: ACTIVE | Noted: 2022-09-20

## 2022-09-20 PROBLEM — E11.9 DM (DIABETES MELLITUS) (HCC): Status: ACTIVE | Noted: 2022-09-20

## 2022-09-20 PROBLEM — K25.9 GASTRIC ULCER: Status: ACTIVE | Noted: 2022-09-20

## 2022-09-20 PROBLEM — Z87.19 STATUS POST DILATATION OF ESOPHAGEAL STRICTURE: Status: ACTIVE | Noted: 2022-09-20

## 2022-09-20 PROBLEM — E11.65 TYPE 2 DIABETES MELLITUS WITH HYPERGLYCEMIA, WITHOUT LONG-TERM CURRENT USE OF INSULIN (HCC): Status: ACTIVE | Noted: 2022-09-20

## 2022-09-20 PROBLEM — K21.9 GERD (GASTROESOPHAGEAL REFLUX DISEASE): Status: ACTIVE | Noted: 2022-09-20

## 2022-09-20 PROBLEM — E78.5 HYPERLIPIDEMIA: Status: ACTIVE | Noted: 2022-09-20

## 2022-09-20 LAB
ALBUMIN SERPL-MCNC: 4.1 G/DL (ref 3.5–5.2)
ALBUMIN SERPL-MCNC: 4.4 G/DL (ref 3.5–5.2)
ALP BLD-CCNC: 133 U/L (ref 35–104)
ALP BLD-CCNC: 133 U/L (ref 35–104)
ALT SERPL-CCNC: 16 U/L (ref 5–33)
ALT SERPL-CCNC: 17 U/L (ref 5–33)
ANION GAP SERPL CALCULATED.3IONS-SCNC: 11 MMOL/L (ref 7–19)
ANION GAP SERPL CALCULATED.3IONS-SCNC: 11 MMOL/L (ref 7–19)
AST SERPL-CCNC: 20 U/L (ref 5–32)
AST SERPL-CCNC: 22 U/L (ref 5–32)
BASOPHILS ABSOLUTE: 0 K/UL (ref 0–0.2)
BASOPHILS RELATIVE PERCENT: 0.6 % (ref 0–1)
BILIRUB SERPL-MCNC: 0.4 MG/DL (ref 0.2–1.2)
BILIRUB SERPL-MCNC: <0.2 MG/DL (ref 0.2–1.2)
BUN BLDV-MCNC: 29 MG/DL (ref 8–23)
BUN BLDV-MCNC: 32 MG/DL (ref 8–23)
CALCIUM IONIZED: 1.83 MMOL/L (ref 1.12–1.32)
CALCIUM SERPL-MCNC: 14.1 MG/DL (ref 8.8–10.2)
CALCIUM SERPL-MCNC: 14.3 MG/DL (ref 8.8–10.2)
CHLORIDE BLD-SCNC: 100 MMOL/L (ref 98–111)
CHLORIDE BLD-SCNC: 99 MMOL/L (ref 98–111)
CO2: 26 MMOL/L (ref 22–29)
CO2: 27 MMOL/L (ref 22–29)
CREAT SERPL-MCNC: 1.4 MG/DL (ref 0.5–0.9)
CREAT SERPL-MCNC: 1.4 MG/DL (ref 0.5–0.9)
EOSINOPHILS ABSOLUTE: 0.3 K/UL (ref 0–0.6)
EOSINOPHILS RELATIVE PERCENT: 4.9 % (ref 0–5)
GFR AFRICAN AMERICAN: 45
GFR AFRICAN AMERICAN: 45
GFR NON-AFRICAN AMERICAN: 37
GFR NON-AFRICAN AMERICAN: 37
GLUCOSE BLD-MCNC: 101 MG/DL (ref 74–109)
GLUCOSE BLD-MCNC: 111 MG/DL (ref 70–99)
GLUCOSE BLD-MCNC: 90 MG/DL (ref 74–109)
HCT VFR BLD CALC: 35.8 % (ref 37–47)
HEMOGLOBIN: 10.9 G/DL (ref 12–16)
IMMATURE GRANULOCYTES #: 0 K/UL
LYMPHOCYTES ABSOLUTE: 1.7 K/UL (ref 1.1–4.5)
LYMPHOCYTES RELATIVE PERCENT: 26.1 % (ref 20–40)
MAGNESIUM: 1.4 MG/DL (ref 1.6–2.4)
MCH RBC QN AUTO: 26.2 PG (ref 27–31)
MCHC RBC AUTO-ENTMCNC: 30.4 G/DL (ref 33–37)
MCV RBC AUTO: 86.1 FL (ref 81–99)
MONOCYTES ABSOLUTE: 0.8 K/UL (ref 0–0.9)
MONOCYTES RELATIVE PERCENT: 12 % (ref 0–10)
NEUTROPHILS ABSOLUTE: 3.7 K/UL (ref 1.5–7.5)
NEUTROPHILS RELATIVE PERCENT: 56.2 % (ref 50–65)
PDW BLD-RTO: 13.3 % (ref 11.5–14.5)
PERFORMED ON: ABNORMAL
PLATELET # BLD: 258 K/UL (ref 130–400)
PMV BLD AUTO: 9.6 FL (ref 9.4–12.3)
POTASSIUM SERPL-SCNC: 3.8 MMOL/L (ref 3.5–5)
POTASSIUM SERPL-SCNC: 4 MMOL/L (ref 3.5–5)
RBC # BLD: 4.16 M/UL (ref 4.2–5.4)
SARS-COV-2, NAAT: NOT DETECTED
SODIUM BLD-SCNC: 137 MMOL/L (ref 136–145)
SODIUM BLD-SCNC: 137 MMOL/L (ref 136–145)
TOTAL PROTEIN: 6.6 G/DL (ref 6.6–8.7)
TOTAL PROTEIN: 6.9 G/DL (ref 6.6–8.7)
WBC # BLD: 6.6 K/UL (ref 4.8–10.8)

## 2022-09-20 PROCEDURE — 96374 THER/PROPH/DIAG INJ IV PUSH: CPT

## 2022-09-20 PROCEDURE — 36415 COLL VENOUS BLD VENIPUNCTURE: CPT

## 2022-09-20 PROCEDURE — 6370000000 HC RX 637 (ALT 250 FOR IP): Performed by: HOSPITALIST

## 2022-09-20 PROCEDURE — 82330 ASSAY OF CALCIUM: CPT

## 2022-09-20 PROCEDURE — 83735 ASSAY OF MAGNESIUM: CPT

## 2022-09-20 PROCEDURE — 6360000002 HC RX W HCPCS: Performed by: EMERGENCY MEDICINE

## 2022-09-20 PROCEDURE — 85025 COMPLETE CBC W/AUTO DIFF WBC: CPT

## 2022-09-20 PROCEDURE — 71045 X-RAY EXAM CHEST 1 VIEW: CPT | Performed by: RADIOLOGY

## 2022-09-20 PROCEDURE — 2580000003 HC RX 258: Performed by: EMERGENCY MEDICINE

## 2022-09-20 PROCEDURE — 6360000002 HC RX W HCPCS: Performed by: HOSPITALIST

## 2022-09-20 PROCEDURE — 2580000003 HC RX 258: Performed by: HOSPITALIST

## 2022-09-20 PROCEDURE — 71045 X-RAY EXAM CHEST 1 VIEW: CPT

## 2022-09-20 PROCEDURE — 87635 SARS-COV-2 COVID-19 AMP PRB: CPT

## 2022-09-20 PROCEDURE — 99285 EMERGENCY DEPT VISIT HI MDM: CPT

## 2022-09-20 PROCEDURE — 1210000000 HC MED SURG R&B

## 2022-09-20 PROCEDURE — 93005 ELECTROCARDIOGRAM TRACING: CPT | Performed by: EMERGENCY MEDICINE

## 2022-09-20 PROCEDURE — 80053 COMPREHEN METABOLIC PANEL: CPT

## 2022-09-20 RX ORDER — ENOXAPARIN SODIUM 100 MG/ML
40 INJECTION SUBCUTANEOUS DAILY
Status: DISCONTINUED | OUTPATIENT
Start: 2022-09-21 | End: 2022-09-23 | Stop reason: HOSPADM

## 2022-09-20 RX ORDER — SODIUM CHLORIDE 0.9 % (FLUSH) 0.9 %
10 SYRINGE (ML) INJECTION EVERY 12 HOURS SCHEDULED
Status: DISCONTINUED | OUTPATIENT
Start: 2022-09-20 | End: 2022-09-23 | Stop reason: HOSPADM

## 2022-09-20 RX ORDER — SODIUM CHLORIDE 9 MG/ML
INJECTION, SOLUTION INTRAVENOUS PRN
Status: DISCONTINUED | OUTPATIENT
Start: 2022-09-20 | End: 2022-09-23 | Stop reason: HOSPADM

## 2022-09-20 RX ORDER — ACETAMINOPHEN 325 MG/1
650 TABLET ORAL EVERY 8 HOURS PRN
COMMUNITY

## 2022-09-20 RX ORDER — LOSARTAN POTASSIUM 100 MG/1
100 TABLET ORAL DAILY
Status: DISCONTINUED | OUTPATIENT
Start: 2022-09-21 | End: 2022-09-23 | Stop reason: HOSPADM

## 2022-09-20 RX ORDER — LACTOBACILLUS RHAMNOSUS GG 10B CELL
1 CAPSULE ORAL DAILY
Status: DISCONTINUED | OUTPATIENT
Start: 2022-09-21 | End: 2022-09-23 | Stop reason: HOSPADM

## 2022-09-20 RX ORDER — ACETAMINOPHEN 650 MG/1
650 SUPPOSITORY RECTAL EVERY 6 HOURS PRN
Status: DISCONTINUED | OUTPATIENT
Start: 2022-09-20 | End: 2022-09-23 | Stop reason: HOSPADM

## 2022-09-20 RX ORDER — MAGNESIUM SULFATE 1 G/100ML
1000 INJECTION INTRAVENOUS PRN
Status: DISCONTINUED | OUTPATIENT
Start: 2022-09-20 | End: 2022-09-23 | Stop reason: HOSPADM

## 2022-09-20 RX ORDER — LORAZEPAM 2 MG/ML
0.5 INJECTION INTRAMUSCULAR ONCE
Status: COMPLETED | OUTPATIENT
Start: 2022-09-21 | End: 2022-09-20

## 2022-09-20 RX ORDER — UBIDECARENONE 100 MG
200 CAPSULE ORAL DAILY
Status: DISCONTINUED | OUTPATIENT
Start: 2022-09-21 | End: 2022-09-23 | Stop reason: HOSPADM

## 2022-09-20 RX ORDER — DOXYCYCLINE HYCLATE 100 MG/1
100 CAPSULE ORAL 2 TIMES DAILY
Status: DISCONTINUED | OUTPATIENT
Start: 2022-09-20 | End: 2022-09-20

## 2022-09-20 RX ORDER — SODIUM CHLORIDE 9 MG/ML
INJECTION, SOLUTION INTRAVENOUS CONTINUOUS
Status: DISCONTINUED | OUTPATIENT
Start: 2022-09-20 | End: 2022-09-23 | Stop reason: HOSPADM

## 2022-09-20 RX ORDER — ASPIRIN 81 MG/1
81 TABLET ORAL 2 TIMES DAILY
Status: DISCONTINUED | OUTPATIENT
Start: 2022-09-20 | End: 2022-09-23 | Stop reason: HOSPADM

## 2022-09-20 RX ORDER — CETIRIZINE HYDROCHLORIDE 10 MG/1
5 TABLET ORAL DAILY
Status: DISCONTINUED | OUTPATIENT
Start: 2022-09-21 | End: 2022-09-23 | Stop reason: HOSPADM

## 2022-09-20 RX ORDER — ESTRADIOL 0.1 MG/G
2 CREAM VAGINAL DAILY
Status: DISCONTINUED | OUTPATIENT
Start: 2022-09-21 | End: 2022-09-23 | Stop reason: HOSPADM

## 2022-09-20 RX ORDER — DOXYCYCLINE HYCLATE 100 MG/1
100 CAPSULE ORAL 2 TIMES DAILY
COMMUNITY

## 2022-09-20 RX ORDER — PANTOPRAZOLE SODIUM 40 MG/1
40 TABLET, DELAYED RELEASE ORAL
Status: DISCONTINUED | OUTPATIENT
Start: 2022-09-21 | End: 2022-09-23 | Stop reason: HOSPADM

## 2022-09-20 RX ORDER — HYDROCODONE BITARTRATE AND ACETAMINOPHEN 5; 325 MG/1; MG/1
1 TABLET ORAL EVERY 6 HOURS PRN
COMMUNITY

## 2022-09-20 RX ORDER — MAGNESIUM SULFATE IN WATER 40 MG/ML
2000 INJECTION, SOLUTION INTRAVENOUS ONCE
Status: COMPLETED | OUTPATIENT
Start: 2022-09-20 | End: 2022-09-21

## 2022-09-20 RX ORDER — ONDANSETRON 2 MG/ML
4 INJECTION INTRAMUSCULAR; INTRAVENOUS EVERY 6 HOURS PRN
Status: DISCONTINUED | OUTPATIENT
Start: 2022-09-20 | End: 2022-09-23 | Stop reason: HOSPADM

## 2022-09-20 RX ORDER — ROPINIROLE 1 MG/1
1 TABLET, FILM COATED ORAL NIGHTLY
Status: DISCONTINUED | OUTPATIENT
Start: 2022-09-20 | End: 2022-09-23 | Stop reason: HOSPADM

## 2022-09-20 RX ORDER — POTASSIUM CHLORIDE 7.45 MG/ML
10 INJECTION INTRAVENOUS PRN
Status: DISCONTINUED | OUTPATIENT
Start: 2022-09-20 | End: 2022-09-23 | Stop reason: HOSPADM

## 2022-09-20 RX ORDER — SODIUM CHLORIDE 0.9 % (FLUSH) 0.9 %
10 SYRINGE (ML) INJECTION PRN
Status: DISCONTINUED | OUTPATIENT
Start: 2022-09-20 | End: 2022-09-23 | Stop reason: HOSPADM

## 2022-09-20 RX ORDER — ASCORBIC ACID 500 MG
500 TABLET ORAL DAILY
Status: ON HOLD | COMMUNITY
End: 2022-09-23 | Stop reason: HOSPADM

## 2022-09-20 RX ORDER — FUROSEMIDE 10 MG/ML
40 INJECTION INTRAMUSCULAR; INTRAVENOUS ONCE
Status: COMPLETED | OUTPATIENT
Start: 2022-09-20 | End: 2022-09-20

## 2022-09-20 RX ORDER — POTASSIUM CHLORIDE 20 MEQ/1
40 TABLET, EXTENDED RELEASE ORAL PRN
Status: DISCONTINUED | OUTPATIENT
Start: 2022-09-20 | End: 2022-09-23 | Stop reason: HOSPADM

## 2022-09-20 RX ORDER — CHOLECALCIFEROL (VITAMIN D3) 125 MCG
500 CAPSULE ORAL
COMMUNITY

## 2022-09-20 RX ORDER — IRON POLYSACCHARIDE COMPLEX 150 MG
150 CAPSULE ORAL DAILY
Status: DISCONTINUED | OUTPATIENT
Start: 2022-09-21 | End: 2022-09-23 | Stop reason: HOSPADM

## 2022-09-20 RX ORDER — ACETAMINOPHEN 325 MG/1
650 TABLET ORAL EVERY 6 HOURS PRN
Status: DISCONTINUED | OUTPATIENT
Start: 2022-09-20 | End: 2022-09-23 | Stop reason: HOSPADM

## 2022-09-20 RX ORDER — DOXYCYCLINE HYCLATE 100 MG/1
100 CAPSULE ORAL 2 TIMES DAILY
Status: DISCONTINUED | OUTPATIENT
Start: 2022-09-21 | End: 2022-09-23 | Stop reason: HOSPADM

## 2022-09-20 RX ORDER — FLUTICASONE PROPIONATE 50 MCG
2 SPRAY, SUSPENSION (ML) NASAL DAILY
Status: DISCONTINUED | OUTPATIENT
Start: 2022-09-21 | End: 2022-09-23 | Stop reason: HOSPADM

## 2022-09-20 RX ORDER — ATORVASTATIN CALCIUM 10 MG/1
10 TABLET, FILM COATED ORAL EVERY EVENING
Status: DISCONTINUED | OUTPATIENT
Start: 2022-09-20 | End: 2022-09-23 | Stop reason: HOSPADM

## 2022-09-20 RX ORDER — ONDANSETRON 4 MG/1
4 TABLET, ORALLY DISINTEGRATING ORAL EVERY 8 HOURS PRN
Status: DISCONTINUED | OUTPATIENT
Start: 2022-09-20 | End: 2022-09-23 | Stop reason: HOSPADM

## 2022-09-20 RX ORDER — POLYETHYLENE GLYCOL 3350 17 G/17G
17 POWDER, FOR SOLUTION ORAL DAILY PRN
Status: DISCONTINUED | OUTPATIENT
Start: 2022-09-20 | End: 2022-09-23 | Stop reason: HOSPADM

## 2022-09-20 RX ORDER — 0.9 % SODIUM CHLORIDE 0.9 %
1000 INTRAVENOUS SOLUTION INTRAVENOUS ONCE
Status: COMPLETED | OUTPATIENT
Start: 2022-09-20 | End: 2022-09-20

## 2022-09-20 RX ORDER — TROSPIUM CHLORIDE 20 MG/1
20 TABLET, FILM COATED ORAL
Status: DISCONTINUED | OUTPATIENT
Start: 2022-09-21 | End: 2022-09-23 | Stop reason: HOSPADM

## 2022-09-20 RX ORDER — CYCLOBENZAPRINE HCL 10 MG
5 TABLET ORAL 2 TIMES DAILY
Status: DISCONTINUED | OUTPATIENT
Start: 2022-09-20 | End: 2022-09-23 | Stop reason: HOSPADM

## 2022-09-20 RX ADMIN — ROPINIROLE HYDROCHLORIDE 1 MG: 1 TABLET, FILM COATED ORAL at 23:58

## 2022-09-20 RX ADMIN — CYCLOBENZAPRINE 5 MG: 10 TABLET, FILM COATED ORAL at 23:58

## 2022-09-20 RX ADMIN — ASPIRIN 81 MG: 81 TABLET, COATED ORAL at 23:58

## 2022-09-20 RX ADMIN — SODIUM CHLORIDE, PRESERVATIVE FREE 10 ML: 5 INJECTION INTRAVENOUS at 23:34

## 2022-09-20 RX ADMIN — LORAZEPAM 0.5 MG: 2 INJECTION, SOLUTION INTRAMUSCULAR; INTRAVENOUS at 23:59

## 2022-09-20 RX ADMIN — MAGNESIUM SULFATE HEPTAHYDRATE 2000 MG: 40 INJECTION, SOLUTION INTRAVENOUS at 23:34

## 2022-09-20 RX ADMIN — SODIUM CHLORIDE 1000 ML: 9 INJECTION, SOLUTION INTRAVENOUS at 19:36

## 2022-09-20 RX ADMIN — ATORVASTATIN CALCIUM 10 MG: 10 TABLET, FILM COATED ORAL at 23:58

## 2022-09-20 RX ADMIN — FUROSEMIDE 40 MG: 10 INJECTION, SOLUTION INTRAMUSCULAR; INTRAVENOUS at 20:45

## 2022-09-20 RX ADMIN — SODIUM CHLORIDE: 9 INJECTION, SOLUTION INTRAVENOUS at 23:34

## 2022-09-20 ASSESSMENT — ENCOUNTER SYMPTOMS
DIARRHEA: 0
ABDOMINAL PAIN: 0
SHORTNESS OF BREATH: 0
VOMITING: 0

## 2022-09-20 ASSESSMENT — PAIN - FUNCTIONAL ASSESSMENT: PAIN_FUNCTIONAL_ASSESSMENT: NONE - DENIES PAIN

## 2022-09-21 LAB
ANION GAP SERPL CALCULATED.3IONS-SCNC: 11 MMOL/L (ref 7–19)
BUN BLDV-MCNC: 31 MG/DL (ref 8–23)
CALCIUM SERPL-MCNC: 13.4 MG/DL (ref 8.8–10.2)
CHLORIDE BLD-SCNC: 101 MMOL/L (ref 98–111)
CO2: 27 MMOL/L (ref 22–29)
CREAT SERPL-MCNC: 1.3 MG/DL (ref 0.5–0.9)
GFR AFRICAN AMERICAN: 49
GFR NON-AFRICAN AMERICAN: 40
GLUCOSE BLD-MCNC: 165 MG/DL (ref 74–109)
HCT VFR BLD CALC: 33.6 % (ref 37–47)
HEMOGLOBIN: 10.5 G/DL (ref 12–16)
MAGNESIUM: 1.7 MG/DL (ref 1.6–2.4)
MCH RBC QN AUTO: 27.1 PG (ref 27–31)
MCHC RBC AUTO-ENTMCNC: 31.3 G/DL (ref 33–37)
MCV RBC AUTO: 86.6 FL (ref 81–99)
PDW BLD-RTO: 13.4 % (ref 11.5–14.5)
PHOSPHORUS: 3.4 MG/DL (ref 2.5–4.5)
PLATELET # BLD: 249 K/UL (ref 130–400)
PMV BLD AUTO: 10.5 FL (ref 9.4–12.3)
POTASSIUM SERPL-SCNC: 3.3 MMOL/L (ref 3.5–5)
RBC # BLD: 3.88 M/UL (ref 4.2–5.4)
SODIUM BLD-SCNC: 139 MMOL/L (ref 136–145)
VITAMIN D 25-HYDROXY: 58.6 NG/ML
WBC # BLD: 6.9 K/UL (ref 4.8–10.8)

## 2022-09-21 PROCEDURE — 36415 COLL VENOUS BLD VENIPUNCTURE: CPT

## 2022-09-21 PROCEDURE — 2580000003 HC RX 258: Performed by: INTERNAL MEDICINE

## 2022-09-21 PROCEDURE — 82340 ASSAY OF CALCIUM IN URINE: CPT

## 2022-09-21 PROCEDURE — 2580000003 HC RX 258: Performed by: HOSPITALIST

## 2022-09-21 PROCEDURE — 6370000000 HC RX 637 (ALT 250 FOR IP): Performed by: INTERNAL MEDICINE

## 2022-09-21 PROCEDURE — 82542 COL CHROMOTOGRAPHY QUAL/QUAN: CPT

## 2022-09-21 PROCEDURE — 84100 ASSAY OF PHOSPHORUS: CPT

## 2022-09-21 PROCEDURE — 85027 COMPLETE CBC AUTOMATED: CPT

## 2022-09-21 PROCEDURE — 6360000002 HC RX W HCPCS: Performed by: INTERNAL MEDICINE

## 2022-09-21 PROCEDURE — 6370000000 HC RX 637 (ALT 250 FOR IP): Performed by: HOSPITALIST

## 2022-09-21 PROCEDURE — 82947 ASSAY GLUCOSE BLOOD QUANT: CPT

## 2022-09-21 PROCEDURE — 1210000000 HC MED SURG R&B

## 2022-09-21 PROCEDURE — 80048 BASIC METABOLIC PNL TOTAL CA: CPT

## 2022-09-21 PROCEDURE — 83735 ASSAY OF MAGNESIUM: CPT

## 2022-09-21 PROCEDURE — 6360000002 HC RX W HCPCS: Performed by: HOSPITALIST

## 2022-09-21 PROCEDURE — 82306 VITAMIN D 25 HYDROXY: CPT

## 2022-09-21 RX ORDER — POTASSIUM CHLORIDE 7.45 MG/ML
10 INJECTION INTRAVENOUS
Status: COMPLETED | OUTPATIENT
Start: 2022-09-21 | End: 2022-09-21

## 2022-09-21 RX ORDER — POTASSIUM CHLORIDE 20 MEQ/1
20 TABLET, EXTENDED RELEASE ORAL ONCE
Status: COMPLETED | OUTPATIENT
Start: 2022-09-21 | End: 2022-09-21

## 2022-09-21 RX ORDER — AMLODIPINE BESYLATE 10 MG/1
10 TABLET ORAL DAILY
Status: DISCONTINUED | OUTPATIENT
Start: 2022-09-21 | End: 2022-09-23 | Stop reason: HOSPADM

## 2022-09-21 RX ADMIN — ATORVASTATIN CALCIUM 10 MG: 10 TABLET, FILM COATED ORAL at 18:12

## 2022-09-21 RX ADMIN — Medication 150 MG: at 09:01

## 2022-09-21 RX ADMIN — POTASSIUM CHLORIDE 10 MEQ: 7.46 INJECTION, SOLUTION INTRAVENOUS at 03:29

## 2022-09-21 RX ADMIN — FLUTICASONE PROPIONATE 2 SPRAY: 50 SPRAY, METERED NASAL at 09:00

## 2022-09-21 RX ADMIN — CYCLOBENZAPRINE 5 MG: 10 TABLET, FILM COATED ORAL at 09:02

## 2022-09-21 RX ADMIN — DOXYCYCLINE HYCLATE 100 MG: 100 CAPSULE ORAL at 20:07

## 2022-09-21 RX ADMIN — ZOLEDRONIC ACID 4 MG: 4 INJECTION, SOLUTION, CONCENTRATE INTRAVENOUS at 11:38

## 2022-09-21 RX ADMIN — CETIRIZINE HYDROCHLORIDE 5 MG: 10 TABLET, FILM COATED ORAL at 09:01

## 2022-09-21 RX ADMIN — PANTOPRAZOLE SODIUM 40 MG: 40 TABLET, DELAYED RELEASE ORAL at 18:09

## 2022-09-21 RX ADMIN — SODIUM CHLORIDE, PRESERVATIVE FREE 10 ML: 5 INJECTION INTRAVENOUS at 20:07

## 2022-09-21 RX ADMIN — METFORMIN HYDROCHLORIDE 500 MG: 500 TABLET ORAL at 09:02

## 2022-09-21 RX ADMIN — ENOXAPARIN SODIUM 40 MG: 100 INJECTION SUBCUTANEOUS at 09:00

## 2022-09-21 RX ADMIN — POTASSIUM CHLORIDE 10 MEQ: 7.46 INJECTION, SOLUTION INTRAVENOUS at 04:27

## 2022-09-21 RX ADMIN — POTASSIUM CHLORIDE 20 MEQ: 1500 TABLET, EXTENDED RELEASE ORAL at 11:39

## 2022-09-21 RX ADMIN — SODIUM CHLORIDE 150 ML/HR: 9 INJECTION, SOLUTION INTRAVENOUS at 18:08

## 2022-09-21 RX ADMIN — DOXYCYCLINE HYCLATE 100 MG: 100 CAPSULE ORAL at 09:02

## 2022-09-21 RX ADMIN — TROSPIUM CHLORIDE 20 MG: 20 TABLET, FILM COATED ORAL at 18:09

## 2022-09-21 RX ADMIN — METOPROLOL TARTRATE 25 MG: 25 TABLET, FILM COATED ORAL at 01:03

## 2022-09-21 RX ADMIN — ASPIRIN 81 MG: 81 TABLET, COATED ORAL at 20:05

## 2022-09-21 RX ADMIN — AMLODIPINE BESYLATE 10 MG: 10 TABLET ORAL at 11:39

## 2022-09-21 RX ADMIN — TROSPIUM CHLORIDE 20 MG: 20 TABLET, FILM COATED ORAL at 09:01

## 2022-09-21 RX ADMIN — PANTOPRAZOLE SODIUM 40 MG: 40 TABLET, DELAYED RELEASE ORAL at 05:17

## 2022-09-21 RX ADMIN — SODIUM CHLORIDE, PRESERVATIVE FREE 10 ML: 5 INJECTION INTRAVENOUS at 09:00

## 2022-09-21 RX ADMIN — LOSARTAN POTASSIUM 100 MG: 100 TABLET, FILM COATED ORAL at 09:01

## 2022-09-21 RX ADMIN — ASPIRIN 81 MG: 81 TABLET, COATED ORAL at 09:01

## 2022-09-21 RX ADMIN — Medication 1 CAPSULE: at 09:01

## 2022-09-21 RX ADMIN — ROPINIROLE HYDROCHLORIDE 1 MG: 1 TABLET, FILM COATED ORAL at 20:05

## 2022-09-21 RX ADMIN — CYCLOBENZAPRINE 5 MG: 10 TABLET, FILM COATED ORAL at 20:05

## 2022-09-21 RX ADMIN — Medication 200 MG: at 09:01

## 2022-09-21 ASSESSMENT — PAIN SCALES - GENERAL
PAINLEVEL_OUTOF10: 5
PAINLEVEL_OUTOF10: 0

## 2022-09-21 NOTE — ED PROVIDER NOTES
[facility]  eMERGENCY dEPARTMENT eNCOUnter      Pt Name: Daisha Harvey  MRN: [id number]  [place] 1950  Date of evaluation: 9/20/2022  Provider: Cole Ontiveros MD    [address] [place] [address]       Chief Complaint   Patient presents with    Abnormal Lab     Elevated calcium         HISTORY OF PRESENT ILLNESS   (Location/Symptom, Timing/Onset,Context/Setting, Quality, Duration, Modifying Factors, Severity)  Note limiting factors. Daisha Harvey is a 71 y.o. female who presents to the emergency department for evaluation regarding abnormal laboratory studies. Patient arrived here to the ED for evaluation regarding elevated serum calcium levels. She has been undergoing outpatient work-up with the nephrology team and on laboratory studies today was noted to have calcium level of 14.3. She has a recent diagnosis of right total knee arthroplasty with subsequent infection and hardware removal and placement of antibiotic spacer. She is currently on oral antibiotics as managed by outpatient infectious disease clinic. She denies chest pain, shortness of breath. She has not had any vomiting or diarrhea. Denies fevers or chills. Patient does not have any prior history of cancer. HPI    NursingNotes were reviewed. REVIEW OF SYSTEMS    (2-9 systems for level 4, 10 or more for level 5)     Review of Systems   Constitutional:  Negative for chills and fever. Respiratory:  Negative for shortness of breath. Cardiovascular:  Negative for chest pain. Gastrointestinal:  Negative for abdominal pain, diarrhea and vomiting. Neurological:  Negative for dizziness and syncope. All other systems reviewed and are negative.          PAST MEDICALHISTORY     Past Medical History:   Diagnosis Date    Carotid stenosis     Colonic polyp     DM (diabetes mellitus) ([facility] [address]     Essential hypertension     Gastric ulcer     duodenal    GERD (gastroesophageal reflux disease)     Hiatal hernia     Hyperlipidemia     Iron deficiency anemia secondary to blood loss (chronic)     OA (osteoarthritis)     Status post dilatation of esophageal stricture          SURGICAL HISTORY       Past Surgical History:   Procedure Laterality Date    ANKLE SURGERY Left     ORIF    COLONOSCOPY  01/2018    Dr Elayne Tolentino Right 7/21/2022    RIGHT KNEE EXPLANT & PLACEMENT OF ANTIBIOTIC SPACER performed by Alesha Phillips MD at [address] [place] [address] ENDOSCOPY  06/11/2018    Dr Murry Landau     Previous Medications    ASCORBIC ACID (VITAMIN C) 250 MG TABLET    Take 500 mg by mouth in the morning. Indications: Excess or Deficiency of Vitamin C.    ASPIRIN EC 81 MG EC TABLET    Take 1 tablet by mouth in the morning and 1 tablet before bedtime. ATORVASTATIN (LIPITOR) 10 MG TABLET    Take 10 mg by mouth every evening Indications: High Amount of Fats in the Blood    CHOLECALCIFEROL (VITAMIN D3) 50 MCG (2000 UT) CAPS    Take 2,000 Units by mouth in the morning. Indications: Vitamin D Deficiency. CLORAZEPATE (TRANXENE) 3.75 MG TABLET    Take 3.75 mg by mouth nightly. Indications: Disturbed Sleep    COENZYME Q10 10 MG CAPS    Take 200 mg by mouth daily Indications: Treatment for the Prevention of Constipation    CYCLOBENZAPRINE (FLEXERIL) 5 MG TABLET    Take 5 mg by mouth in the morning and 5 mg before bedtime. Indications: Restless Leg Syndrome. ESTRADIOL (ESTRACE) 0.1 MG/GM VAGINAL CREAM    Place 2 g vaginally daily Indications: Vaginitis    FLUTICASONE (FLONASE) 50 MCG/ACT NASAL SPRAY    2 sprays by Each Nostril route daily Indications: Allergic Rhinitis    INDAPAMIDE (LOZOL) 1.25 MG TABLET    Take 1.25 mg by mouth in the morning. Indications: Treatment with Diuretic Therapy. IRON POLYSACCHARIDES (NIFEREX) 150 MG CAPSULE    Take 150 mg by mouth in the morning. Indications: Anemia.     LEVOCETIRIZINE (XYZAL) 5 MG TABLET    Take 5 mg by mouth daily as needed Indications: Allergic Rhinitis    LOSARTAN (COZAAR) 100 MG TABLET    Take 100 mg by mouth daily    METFORMIN (GLUCOPHAGE) 500 MG TABLET    Take 500 mg by mouth every morning (before breakfast) Indications: Diabetes    OMEPRAZOLE (PRILOSEC) 20 MG DELAYED RELEASE CAPSULE    Take 20 mg by mouth in the morning. Indications: Gastroesophageal Reflux Disease. PROBIOTIC PRODUCT (PROBIOTIC DAILY PO)    Take 1 tablet by mouth daily Indications: Treatment for the Prevention of Constipation    ROPINIROLE (REQUIP) 1 MG TABLET    Take 1 mg by mouth nightly as needed    TOLTERODINE (DETROL LA) 4 MG EXTENDED RELEASE CAPSULE    Take 4 mg by mouth nightly Indications: Bladder Spasm    VITAMIN B-12 (CYANOCOBALAMIN) 500 MCG TABLET    Take 500 mcg by mouth Indications: Vitamin Deficiency Taking every other day    ZINC GLUCONATE 50 MG TABLET    Take 50 mg by mouth in the morning. Indications: Vitamin Deficiency. ALLERGIES     Patient has no known allergies. FAMILY HISTORY       Family History   Problem Relation Age of Onset    Colon Cancer Mother     Liver Cancer Mother         mets    Dementia Father           SOCIAL HISTORY       Social History     Socioeconomic History    Marital status:      Spouse name: None    Number of children: None    Years of education: None    Highest education level: None   Tobacco Use    Smoking status: Never    Smokeless tobacco: Never   Substance and Sexual Activity    Alcohol use: Never       SCREENINGS    [place] Coma Scale  Eye Opening: Spontaneous  Best Verbal Response: Oriented  Best Motor Response: Obeys commands  [place] Coma Scale Score: 15        PHYSICAL EXAM    (up to 7 for level 4, 8 or more for level 5)     ED Triage Vitals [09/20/22 1834]   BP Temp Temp src Heart Rate Resp SpO2 Height Weight   (!) 175/73 98.4 °F (36.9 °C) -- (!) 115 17 96 % -- --       Physical Exam  Vitals and nursing note reviewed. HENT:      Head: Atraumatic.       Mouth/Throat:      Mouth: Mucous membranes are moist. Mucous membranes are not dry. Eyes:      General: No scleral icterus. Pupils: Pupils are equal, round, and reactive to light. Neck:      Trachea: No tracheal deviation. Cardiovascular:      Rate and Rhythm: Normal rate and regular rhythm. Pulses: Normal pulses. Heart sounds: Normal heart sounds. No murmur heard. Pulmonary:      Effort: Pulmonary effort is normal. No respiratory distress. Breath sounds: Normal breath sounds. No stridor. Abdominal:      General: There is no distension. Palpations: Abdomen is soft. Tenderness: There is no abdominal tenderness. There is no guarding. Musculoskeletal:      Right lower leg: Edema present. Left lower leg: Edema present. Skin:     Capillary Refill: Capillary refill takes less than 2 seconds. Coloration: Skin is not pale. Findings: No rash. Neurological:      General: No focal deficit present. Mental Status: She is alert and oriented to person, place, and time. Psychiatric:         Behavior: Behavior is cooperative. DIAGNOSTIC RESULTS     EKG: All EKG's areinterpreted by the Emergency Department Physician who either signs or Co-signs this chart in the absence of a cardiologist.    1927: Sinus tachycardia at a rate of 127, no evidence of acute ST elevation identified. QTc: 412 MS. RADIOLOGY:  Non-plain film images such as CT, Ultrasound and MRI are read by the radiologist. Plain radiographic images are visualized and preliminarily interpreted bythe emergency physician with the below findings:      XR CHEST PORTABLE    (Results Pending)     CXR: No evidence of acute infiltrate noted.       LABS:  Labs Reviewed   COMPREHENSIVE METABOLIC PANEL - Abnormal; Notable for the following components:       Result Value    BUN 32 (*)     Creatinine 1.4 (*)     GFR Non- 37 (*)     GFR  45 (*)     Calcium 14.1 (*)     Alkaline Phosphatase 133 (*)     All other components within normal limits    Narrative:     Javier Coffey tel. ,  Chemistry results called to and read back by Jessica Ventura in ED, 09/20/2022  20:09, by HILLARY   CBC WITH AUTO DIFFERENTIAL - Abnormal; Notable for the following components:    RBC 4.16 (*)     Hemoglobin 10.9 (*)     Hematocrit 35.8 (*)     MCH 26.2 (*)     MCHC 30.4 (*)     Monocytes % 12.0 (*)     All other components within normal limits   CALCIUM, IONIZED - Abnormal; Notable for the following components:    Calcium, Ionized 1.83 (*)     All other components within normal limits    Narrative:     CALL  Do  [facility] tel. ,  AUDREY Castillo RN ER, 09/20/2022 19:51, by [place] - Abnormal; Notable for the following components:    Magnesium 1.4 (*)     All other components within normal limits   COVID-19, RAPID       All other labs were within normal range or not returned as of this dictation. EMERGENCY DEPARTMENT COURSE and DIFFERENTIAL DIAGNOSIS/MDM:   Vitals:    Vitals:    09/20/22 1834 09/20/22 1955 09/20/22 2005   BP: (!) 175/73  (!) 153/78   Pulse: (!) 115 (!) 133 (!) 132   Resp: 17 16 14   Temp: 98.4 °F (36.9 °C)  97.9 °F (36.6 °C)   SpO2: 96% 94% 96%       MDM    Patient noted to have hypercalcemia. Was started on IV fluids here in the ED. CONSULTS:    Patient was discussed with Dr. Helga Varner regarding admission hospitalist service. PROCEDURES:  Unless otherwise noted below, none     Procedures    FINAL IMPRESSION      1. Hypercalcemia    2.  Chronic kidney disease, unspecified CKD stage          DISPOSITION/PLAN   DISPOSITION Decision To Admit 09/20/2022 08:37:42 PM        (Please note that portions of this note were completed with a voice recognition program.  Efforts were made to edit thedictations but occasionally words are mis-transcribed.)    Alesia Hancock MD (electronically signed)  Attending Emergency Physician         Alesia Hancock MD  09/30/22 0416

## 2022-09-21 NOTE — H&P
[place], [place], [address]    DEPARTMENT OF HOSPITALIST MEDICINE        HISTORY & PHYSICAL:          REASON FOR ADMISSION:  Chief Complaint   Patient presents with    Abnormal Lab     Elevated calcium        HISTORY OF PRESENT ILLNESS:  Geanie Sandhoff is an 71 y.o. female. She is a very pleasant lady who was sent to the ER for evaluation by the nephrology team for her persistently elevated hypercalcemia. She has recently underwent right total knee arthroplasty status post infection and hardware removal and placement of antibiotic spacer. She is currently on oral antibiotics managed by outpatient infectious disease clinic. She is having persistently elevated which is being addressed by nephrology as an outpatient but she has failed outpatient therapy. Nephrology advised her to come to the ER for admission and inpatient management. Patient was given IV Lasix x1 dose followed by IV hydration in the ER and being admitted for medical management and further work-up by nephrology.     PAST MEDICAL HISTORY:  Past Medical History:   Diagnosis Date    Carotid stenosis     Colonic polyp     DM (diabetes mellitus) (HCC)     Essential hypertension     Gastric ulcer     duodenal    GERD (gastroesophageal reflux disease)     Hiatal hernia     Hyperlipidemia     Iron deficiency anemia secondary to blood loss (chronic)     OA (osteoarthritis)     Status post dilatation of esophageal stricture          PAST SURGICAL HISTORY:  Past Surgical History:   Procedure Laterality Date    ANKLE SURGERY Left     ORIF    COLONOSCOPY  01/2018    Dr Anita Preciado Right 7/21/2022    RIGHT KNEE EXPLANT & PLACEMENT OF ANTIBIOTIC SPACER performed by Duane Sin, MD at [address] [place] [address] ENDOSCOPY  06/11/2018    Dr [facility]        SOCIAL HISTORY:  Social History     Socioeconomic History    Marital status:      Spouse name: None    Number of children: None    Years of education: None    Highest education level: None   Tobacco Use    Smoking status: Never    Smokeless tobacco: Never   Substance and Sexual Activity    Alcohol use: Never        FAMILY HISTORY:  Family History   Problem Relation Age of Onset    Colon Cancer Mother     Liver Cancer Mother         mets    Dementia Father          ALLERGIES:  No Known Allergies     PRIOR TO ADMISSION MEDS:  Medications Prior to Admission: doxycycline hyclate (VIBRAMYCIN) 100 MG capsule, Take 100 mg by mouth 2 times daily  aspirin EC 81 MG EC tablet, Take 1 tablet by mouth in the morning and 1 tablet before bedtime. estradiol (ESTRACE) 0.1 MG/GM vaginal cream, Place 2 g vaginally daily Indications: Vaginitis  Probiotic Product (PROBIOTIC DAILY PO), Take 1 tablet by mouth daily Indications: Treatment for the Prevention of Constipation  fluticasone (FLONASE) 50 MCG/ACT nasal spray, 2 sprays by Each Nostril route daily Indications: Allergic Rhinitis  omeprazole (PRILOSEC) 20 MG delayed release capsule, Take 20 mg by mouth in the morning. Indications: Gastroesophageal Reflux Disease. atorvastatin (LIPITOR) 10 MG tablet, Take 10 mg by mouth every evening Indications: High Amount of Fats in the Blood  cyclobenzaprine (FLEXERIL) 5 MG tablet, Take 5 mg by mouth in the morning and 5 mg before bedtime. Indications: Restless Leg Syndrome. iron polysaccharides (NIFEREX) 150 MG capsule, Take 150 mg by mouth in the morning. Indications: Anemia.  indapamide (LOZOL) 1.25 MG tablet, Take 1.25 mg by mouth in the morning. Indications: Treatment with Diuretic Therapy.   losartan (COZAAR) 100 MG tablet, Take 100 mg by mouth daily  metFORMIN (GLUCOPHAGE) 500 MG tablet, Take 500 mg by mouth every morning (before breakfast) Indications: Diabetes  rOPINIRole (REQUIP) 1 MG tablet, Take 1 mg by mouth nightly as needed  tolterodine (DETROL LA) 4 MG extended release capsule, Take 4 mg by mouth nightly Indications: Bladder Spasm  levocetirizine (XYZAL) 5 MG tablet, Take 5 mg by mouth daily as needed Indications: Allergic Rhinitis  Coenzyme Q10 10 MG CAPS, Take 200 mg by mouth daily Indications: Treatment for the Prevention of Constipation     REVIEW OF SYSTEMS:  Constitutional:  No fevers, chills, nausea, vomiting, + tiredness & fatigue   Head:  No head injury, facial trauma   Eyes:  No acute visual changes, exudate, trauma   Ears:  No acute hearing loss, earaches   Nose: No nasal discharge, epistaxis   Neck: No new hoarseness, voice change, or new masses   Lungs:   No hemoptysis, pleurisy   Heart:  No chest pressure with exertion, palpitations,    Abdomen:   No new masses, no bright red blood per rectum   Extremities: + difficulty ambulation due to weakness, no new lesions   Skin: No new changes in skin color, no rashes or lesions   Neurologic: No new motor or sensory changes     14 point review of systems addressed with patient which is essentially negative except as specifically addressed above:    PHYSICAL EXAM:  BP (!) 155/82   Pulse (!) 137   Temp 97.2 °F (36.2 °C)   Resp 17   Ht 5' 6\" (1.676 m)   Wt 177 lb 11.2 oz (80.6 kg)   SpO2 99%   BMI 28.68 kg/m²   No intake/output data recorded.       PHYSICAL EXAMINATION:    Vital Signs: Please see the chart   VIVIAN:  Awake, alert, oriented x 3, patient appears tired and fatigued   Head/Eyes:  Normocephalic, atraumatic, EOMI and PERRLA bilaterally   ENT: Moist mucous membranes, nasal passages clear   Neck: Supple, full range of motion, no carotid bruit, trachea midline   Respiratory:   Bilateral decreased air entry in both lung fields, mild B/L crackles, symmetric expansion of chest   Cardiovascular:  Regular rate and rhythm, S1+S2+0, no murmurs/rubs   Urology: No bilateral CVA tenderness, no suprapubic tenderness   Abdomen:   Soft, non-tender, bowel sounds +ve, no organomegaly   Muscle/Joints: Moves all, decreased range of motion, no muscle spasms   Extremities: No clubbing, no cyanosis, no calf tenderness, no edema   Pulses: 2+ bilaterally, symmetrical   Skin: Warm, dry, no pallor/cyanosis/jaundice, no rashes/lesions   Neurologic: Awake, alert, oriented x 3, cranial nerves II-XII intact, no focal neurological deficits, sensory system intact   Psychiatric: Normal mood, non-suicidal         LABORATORY DATA:    CBC:  Recent Labs     09/20/22 1935   WBC 6.6   HGB 10.9*   HCT 35.8*        BMP:  Recent Labs     09/20/22 1200 09/20/22 1935    137   K 4.0 3.8   CL 99 100   CO2 27 26   BUN 29* 32*   CREATININE 1.4* 1.4*   CALCIUM 14.3* 14.1*     Recent Labs     09/20/22 1200 09/20/22 1935   AST 22 20   ALT 17 16   BILITOT 0.4 <0.2   ALKPHOS 133* 133*     Coag Panel: No results for input(s): INR, PROTIME, APTT in the last 72 hours. Cardiac Enzymes: No results for input(s): Kathyrn Belch in the last 72 hours. ABGs:No results found for: PHART, PO2ART, ZNU9PHW  Urinalysis:  Lab Results   Component Value Date/Time    NITRU Negative 09/14/2022 12:27 PM    WBCUA 4 09/14/2022 12:27 PM    BACTERIA NEGATIVE 09/14/2022 12:27 PM    RBCUA 1 09/14/2022 12:27 PM    BLOODU Negative 09/14/2022 12:27 PM    SPECGRAV 1.010 09/14/2022 12:27 PM    GLUCOSEU Negative 09/14/2022 12:27 PM     A1C: No results for input(s): LABA1C in the last 72 hours. ABG:No results for input(s): PHART, BLL4VPL, PO2ART, VLQ2BAI, BEART, HGBAE, O3RLZVXL, CARBOXHGBART in the last 72 hours. EKG:   Please see chart      IMAGING:  XR CHEST PORTABLE    Result Date: 9/20/2022  Chest radiograph is unremarkable. Recommendation: Follow up as clinically indicated.  Electronically Signed by Conrad Ray at 20-Sep-2022 11:11:46 PM                 Assessment and Plan:    Principal Problem:    Hypercalcemia  Active Problems:    Colonic polyp    Type 2 diabetes mellitus with hyperglycemia, without long-term current use of insulin (HCC)    Essential hypertension    GERD (gastroesophageal reflux disease)    Hyperlipidemia    OA (osteoarthritis)    Status post dilatation of esophageal stricture    Iron deficiency anemia secondary to blood loss (chronic)  Resolved Problems:    * No resolved hospital problems. *       Patient to medical unit under full inpatient status  Patient has failed outpatient therapy for persistent hypercalcemia  Patient recommended by nephrology to come to the ER for admission and inpatient management  Patient received 1 dose of IV Lasix followed by 1 L of normal saline bolus  Started patient on IV hydration with normal saline 100 cc/h  Strict I's and O's  Nephrology consult given for evaluation and further treatment recommendations regarding further treatment recommendations for hypercalcemia  Patient magnesium level was borderline low which is being replaced  Continue with oral antibiotics doxycycline 100 mg p.o. twice daily as recommended by ID after removal of infected right knee hardware    Chronic medical issues . .. Continue with home meds. Monitor patient closely while admitted. Advised very close f/u with patient's PCP as an outpatient to address chronic medical issues. Repeat labs in a.m. Electrolyte replacement as per protocol. Patient will be monitored very closely on the floor. Further recommendations as per the hospital course. Patient's management will be taken over by our [facility] in am.    Patient  is on DVT prophylaxis  Current medications reviewed  Lab work reviewed  Radiology/Chest x-ray films reviewed  Discussed with the nurse and addressed all questions/concerns  Discussed with Patient and/or Family at the bedside in detail . .. they verbalize understanding and agree with the management plan. Attestation:  Inpatient status is used for patients with an expected LOS extending past two midnights due to medical therapy and/or critical care needs  . .. all other patients are placed under OBServation status.       Irene Mena MD  11:22 PM 9/20/2022      DISCLAIMER: This note was created with electronic voice recognition which does have occasional errors. If you have any questions regarding the content within the note please do not hesitate to contact me. .. Thanks.

## 2022-09-21 NOTE — ED NOTES
Per Dr. Trotter November, patient is okay to take PO antibiotics from home at this time.      Mary Zheng RN  09/20/22 4678

## 2022-09-21 NOTE — ED NOTES
Claudette PEREZ updated on pt status, overall status the same as earlier, pt ready for transport to [employer], RN  09/20/22 6201

## 2022-09-21 NOTE — CONSULTS
Nephrology ([facility]) Consult Note      Patient:  Pilar Sneed  YOB: 1950  Date of Service: 9/21/2022  MRN: [id number]   Acct: [de-identified]   Primary Care Physician: Alice Brandt MD  Advance Directive: Full Code  Admit Date: 9/20/2022       Hospital Day: 1  Referring Provider: Anika Clayton MD    Patient independently seen and examined, Chart, Consults, Notes, Operative notes, Labs, Cardiology, and Radiology studies reviewed as available. Chief complaint: Abnormal labs. Subjective:  Pilar Sneed is a 71 y.o. female for whom we were consulted for evaluation and treatment of HYPERCALCEMIA. Hypercalcemia never been her problem, recently recognized on routine lab in last few weeks. She follows LISA Owens at our office. She has chronic kidney disease, hypertension, type 2 diabetes and osteoarthritis. Incidental finding in the lab shows serum calcium was 14.5 mg. She was directed to go to the hospital.  Patient denies any use of Tums or excessive milk intake or cheese product or even ice cream.  She has been drinking more water to keep herself hydrated. Patient denies any history of cancer. Most recent work-up as an outpatient consistent with normal studies except PTH RP is still pending. She is currently being treated with IV fluid and also given Zometa 4 mg last night. Her  is at the bedside. Patient reports fatigue, lack of energy and tiredness. Allergies:  Patient has no known allergies.     Medicines:  Current Facility-Administered Medications   Medication Dose Route Frequency Provider Last Rate Last Admin    atorvastatin (LIPITOR) tablet 10 mg  10 mg Oral QPM Shaheen Rico MD   10 mg at 09/20/22 2358    cyclobenzaprine (FLEXERIL) tablet 5 mg  5 mg Oral BID Shaheen Rico MD   5 mg at 09/21/22 0902    iron polysaccharides (NIFEREX) capsule 150 mg  150 mg Oral Daily Shaheen Rico MD   150 mg at 09/21/22 0901    losartan (COZAAR) tablet 100 mg  100 mg Oral Daily Shaheen Rico MD   100 mg at 09/21/22 0901    metFORMIN (GLUCOPHAGE) tablet 500 mg  500 mg Oral QAM AC Shaheen Rico MD   500 mg at 09/21/22 0902    rOPINIRole (REQUIP) tablet 1 mg  1 mg Oral Nightly Shaheen Rico MD   1 mg at 09/20/22 2358    coenzyme Q10 capsule 200 mg  200 mg Oral Daily Shaheen Rico MD   200 mg at 09/21/22 0901    fluticasone (FLONASE) 50 MCG/ACT nasal spray 2 spray  2 spray Each Nostril Daily Shaheen Rico MD   2 spray at 09/21/22 0900    estradiol (ESTRACE) vaginal cream 2 g  2 g Vaginal Daily Shaheen Rico MD        aspirin EC tablet 81 mg  81 mg Oral BID Shaheen Rico MD   81 mg at 09/21/22 0901    pantoprazole (PROTONIX) tablet 40 mg  40 mg Oral BID AC Shaheen Rico MD   40 mg at 09/21/22 0517    lactobacillus (CULTURELLE) capsule 1 capsule  1 capsule Oral Daily Shaheen Rico MD   1 capsule at 09/21/22 0901    trospium (SANCTURA) tablet 20 mg  20 mg Oral BID AC Shaheen Rico MD   20 mg at 09/21/22 0901    cetirizine (ZYRTEC) tablet 5 mg  5 mg Oral Daily Shaheen Rico MD   5 mg at 09/21/22 0901    sodium chloride flush 0.9 % injection 10 mL  10 mL IntraVENous 2 times per day Shaheen Rico MD   10 mL at 09/21/22 0900    sodium chloride flush 0.9 % injection 10 mL  10 mL IntraVENous PRN Shaheen Rico MD        0.9 % sodium chloride infusion   IntraVENous PRN Shaheen Rico MD        potassium chloride (KLOR-CON M) extended release tablet 40 mEq  40 mEq Oral PRN Shaheen Rico MD        Or    potassium bicarb-citric acid (EFFER-K) effervescent tablet 40 mEq  40 mEq Oral PRN Shaheen Rico MD        Or    potassium chloride 10 mEq/100 mL IVPB (Peripheral Line)  10 mEq IntraVENous PRN Shaheen Rico MD        magnesium sulfate 1000 mg in dextrose 5% 100 mL IVPB  1,000 mg IntraVENous PRN Shaheen Rico MD        enoxaparin (LOVENOX) injection 40 mg  40 mg SubCUTAneous Daily Shaheen Rico MD   40 mg at 09/21/22 0900    ondansetron (ZOFRAN-ODT) disintegrating tablet 4 mg  4 mg Oral Q8H PRN Denita Cleaning MD Or    ondansetron (ZOFRAN) injection 4 mg  4 mg IntraVENous Q6H PRN Shaheen Rico MD        polyethylene glycol (GLYCOLAX) packet 17 g  17 g Oral Daily PRN Shaheen Rico MD        acetaminophen (TYLENOL) tablet 650 mg  650 mg Oral Q6H PRN Shaheen Rico MD        Or    acetaminophen (TYLENOL) suppository 650 mg  650 mg Rectal Q6H PRN Shaheen Rico MD        0.9 % sodium chloride infusion   IntraVENous Continuous Shaheen Rico  mL/hr at 09/20/22 2334 New Bag at 09/20/22 2334    doxycycline hyclate (VIBRAMYCIN) capsule 100 mg  100 mg Oral BID Shaheen Rico MD   100 mg at 09/21/22 4615       Past Medical History:  Past Medical History:   Diagnosis Date    Carotid stenosis     Colonic polyp     DM (diabetes mellitus) ([address]     Essential hypertension     Gastric ulcer     duodenal    GERD (gastroesophageal reflux disease)     Hiatal hernia     Hyperlipidemia     Iron deficiency anemia secondary to blood loss (chronic)     OA (osteoarthritis)     Status post dilatation of esophageal stricture        Past Surgical History:  Past Surgical History:   Procedure Laterality Date    ANKLE SURGERY Left     ORIF    COLONOSCOPY  01/2018    Dr Efrain Alvarez Right 7/21/2022    RIGHT KNEE EXPLANT & PLACEMENT OF ANTIBIOTIC SPACER performed by Ras Marcelino MD at [address] ENDOSCOPY  06/11/2018    Dr Joy Alaniz       Family History  Family History   Problem Relation Age of Onset    Colon Cancer Mother     Liver Cancer Mother         mets    Dementia Father        Social History  Social History     Socioeconomic History    Marital status:      Spouse name: Not on file    Number of children: Not on file    Years of education: Not on file    Highest education level: Not on file   Occupational History    Not on file   Tobacco Use    Smoking status: Never    Smokeless tobacco: Never   Substance and Sexual Activity    Alcohol use: Never Drug use: Not on file    Sexual activity: Not on file   Other Topics Concern    Not on file   Social History Narrative    Not on file     Social Determinants of Health     Financial Resource Strain: Not on file   Food Insecurity: Not on file   Transportation Needs: Not on file   Physical Activity: Not on file   Stress: Not on file   Social Connections: Not on file   Intimate Partner Violence: Not on file   Housing Stability: Not on file         Review of Systems:  History obtained from chart review and the patient  General ROS: No fever or chills  Respiratory ROS: No cough, shortness of breath, wheezing  Cardiovascular ROS: No chest pain or palpitations  Gastrointestinal ROS: No abdominal pain or melena  Genito-Urinary ROS: No dysuria or hematuria  Musculoskeletal ROS: No joint pain or swelling   14 point ROS reviewed with the patient and negative except as noted above and in the HPI unless unable to obtain. Objective:  Patient Vitals for the past 24 hrs:   BP Temp Temp src Pulse Resp SpO2 Height Weight   09/21/22 0700 (!) 146/90 97.5 °F (36.4 °C) Oral (!) 114 17 96 % -- --   09/21/22 0209 -- -- -- (!) 113 -- -- -- --   09/21/22 0145 -- -- -- (!) 118 -- -- -- --   09/21/22 0045 (!) 141/69 97.2 °F (36.2 °C) Temporal (!) 128 14 -- -- --   09/21/22 0010 -- -- -- -- -- -- -- 177 lb 11.2 oz (80.6 kg)   09/20/22 2306 (!) 155/82 97.2 °F (36.2 °C) Temporal (!) 137 17 99 % 5' 6\" (1.676 m) 177 lb 11.2 oz (80.6 kg)   09/20/22 2102 (!) 157/89 -- -- (!) 134 20 -- -- --   09/20/22 2005 (!) 153/78 97.9 °F (36.6 °C) -- (!) 132 14 96 % -- --   09/20/22 1955 -- -- -- (!) 133 16 94 % -- --   09/20/22 1834 (!) 175/73 98.4 °F (36.9 °C) -- (!) 115 17 96 % -- --       Intake/Output Summary (Last 24 hours) at 9/21/2022 0924  Last data filed at 9/21/2022 0523  Gross per 24 hour   Intake --   Output 1400 ml   Net -1400 ml     General: awake/alert   HEENT: Normocephalic atraumatic head  Neck: Supple with no JVD or carotid bruits.   Chest: clear to auscultation bilaterally  CVS: regular rate and rhythm  Abdominal: soft, nontender, normal bowel sounds  Extremities: no cyanosis or edema  Skin: warm and dry without rash      Labs:  BMP:   Recent Labs     09/20/22 1200 09/20/22 1935 09/21/22 0121    137 139   K 4.0 3.8 3.3*   CL 99 100 101   CO2 27 26 27   PHOS  --   --  3.4   BUN 29* 32* 31*   CREATININE 1.4* 1.4* 1.3*   CALCIUM 14.3* 14.1* 13.4*     CBC:   Recent Labs     09/20/22 1935 09/21/22 0121   WBC 6.6 6.9   HGB 10.9* 10.5*   HCT 35.8* 33.6*   MCV 86.1 86.6    249     LIVER PROFILE:   Recent Labs     09/20/22 1200 09/20/22 1935   AST 22 20   ALT 17 16   BILITOT 0.4 <0.2   ALKPHOS 133* 133*     PT/INR: No results for input(s): PROTIME, INR in the last 72 hours. APTT: No results for input(s): APTT in the last 72 hours. BNP:  No results for input(s): BNP in the last 72 hours. Ionized Calcium:No results for input(s): IONCA in the last 72 hours. Magnesium:  Recent Labs     09/20/22 1935 09/21/22 0121   MG 1.4* 1.7     Phosphorus:  Recent Labs     09/21/22 0121   PHOS 3.4     HgbA1C: No results for input(s): LABA1C in the last 72 hours. Hepatic:   Recent Labs     09/20/22 1200 09/20/22 1935   ALKPHOS 133* 133*   ALT 17 16   AST 22 20   PROT 6.9 6.6   BILITOT 0.4 <0.2   LABALBU 4.4 4.1     Lactic Acid: No results for input(s): LACTA in the last 72 hours. Troponin: No results for input(s): CKTOTAL, CKMB, TROPONINT in the last 72 hours. ABGs: No results for input(s): PH, PCO2, PO2, HCO3, O2SAT in the last 72 hours. CRP:  No results for input(s): CRP in the last 72 hours. Sed Rate:  No results for input(s): SEDRATE in the last 72 hours. Cultures:   No results for input(s): CULTURE in the last 72 hours. No results for input(s): Aileen ZAPATA in the last 72 hours. No results for input(s): CXSURG in the last 72 hours.     Radiology reports as per the Radiologist  Radiology: XR CHEST PORTABLE    Result Date: 9/20/2022  NO PRIOR REPORT AVAILABLE Exam: X-RAY OF [facility] Clinical data:Cough. Technique:Single view of the chest. Prior studies: No prior studies submitted. Findings: The lungs are grossly clear; noevidence of acute infiltrate or pleural effusion. Cardiac silhouette is within normal limits. No acute osseous abnormality is detected. Chest radiograph is unremarkable. Recommendation: Follow up as clinically indicated. Electronically Signed by Carlos Connor at 20-Sep-2022 11:11:46 PM                Assessment   1. HYPERCALCEMIA/unknown etiology. 2.  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 Stage IIIa chronic kidney disease baseline. 4.  Type II diabetic nephropathy. 5.  Poorly controlled hypertension. Plan:  1. Get PTH RP level. 2.  Increase IV fluid. 3.  IV Zometa. 4.  24-hour urine collection for calcium      Thank you for the consult, we appreciate the opportunity to provide care to your patients. Feel free to contact me if I can be of any further assistance.       Saniya Hadley MD  09/21/22  9:24 AM

## 2022-09-21 NOTE — ED NOTES
No admit order in at this time, pt unable to go upstairs, awaiting admit orders     Samina Love RN  09/20/22 2040

## 2022-09-21 NOTE — PROGRESS NOTES
Lauren Tran arrived to room # 313. Presented with: hypercalcemia  Mental Status: Patient is oriented, alert, coherent, logical, thought processes intact, and able to concentrate and follow conversation. Vitals:    09/21/22 0145   BP:    Pulse: (!) 118   Resp:    Temp:    SpO2:      Patient safety contract and falls prevention contract reviewed with patient Yes. Oriented Patient to room. Call light within reach. Yes.   Needs, issues or concerns expressed at this time: no.      Electronically signed by Lor Duckworth RN on 9/21/2022 at 1:51 AM

## 2022-09-21 NOTE — PROGRESS NOTES
Date:2022  Patient: Pierre Khan  : 1950  CLJ:[id number]  CODE:                                                                        Yelena Cheng MD    Admit Date: 2022  7:02 PM   LOS: 1 day     Hospital course :   HPI  Incidental finding in the lab shows serum calcium was 14.5 mg. She was directed to go to the hospital emergency 2 times and has been treated with calcitonin and IV fluid however the repeat calcium remained high and finally patient is admitted for further evaluation. Patient denies any use of Tums or excessive milk intake or cheese product or even ice cream.  She has been drinking more water to keep herself hydrated. Patient denies any history of cancer. Most recent work-up as an outpatient consistent with normal studies except PTH RP is still pending. She is currently being treated with IV fluid and also given Zometa 4 mg last night. Her  is at the bedside. Patient reports fatigue, lack of energy and tiredness. Subjective: discuss about the present management and labs follow-up, with 24-hour urine collection to rule out different causes of hypercalcemia, she verbalizes understanding although feels sleepy while talking to me.      Review of Systems    Reviewed 12 system and found most of them negative except as stated above in subjective note    Objective:      Vital signs in last 24 hours:  Patient Vitals for the past 24 hrs:   BP Temp Temp src Pulse Resp SpO2 Height Weight   22 1203 138/60 97.5 °F (36.4 °C) Oral 93 14 97 % -- --   22 0700 (!) 146/90 97.5 °F (36.4 °C) Oral (!) 114 17 96 % -- --   22 0209 -- -- -- (!) 113 -- -- -- --   22 0145 -- -- -- (!) 118 -- -- -- --   22 0045 (!) 141/69 97.2 °F (36.2 °C) Temporal (!) 128 14 -- -- --   22 0010 -- -- -- -- -- -- -- 177 lb 11.2 oz (80.6 kg)   22 2306 (!) 155/82 97.2 °F (36.2 °C) Temporal (!) 137 17 99 % 5' 6\" (1.676 m) 177 lb 11.2 oz (80.6 kg)   22 (!) 157/89 -- -- (!) 134 20 -- -- --   09/20/22 2005 (!) 153/78 97.9 °F (36.6 °C) -- (!) 132 14 96 % -- --   09/20/22 1955 -- -- -- (!) 133 16 94 % -- --   09/20/22 1834 (!) 175/73 98.4 °F (36.9 °C) -- (!) 115 17 96 % -- --       Patient examined with appropriate PPE  Physical Exam:  Vital Signs: /60   Pulse 93   Temp 97.5 °F (36.4 °C) (Oral)   Resp 14   Ht 5' 6\" (1.676 m)   Wt 177 lb 11.2 oz (80.6 kg)   SpO2 97%   BMI 28.68 kg/m²   General appearance:. Lying comfortably in bed ,   HEENT: Normocephalic , Atraumatic, PERRL, JVP not raised  Chest: On inspection no use of accessory muscles of respiration, on auscultation vesicular breath sounds equal bilaterally no rales rhonchi or wheezing   cardiac: Regular rate and rhythm, S1, S2 normal. No murmurs, gallops, or rubs auscultated. Abdomen:soft, non-tender; normal bowel sounds, no masses, no organomegaly. Urogenital : no leyva, no suprapubic tenderness, no CVA tenderness  Extremities: No clubbing or cyanosis. No peripheral edema. Peripheral pulses palpable.   Neurologic: Alert and Cooperative , cranial nerves grossly intact, DTR equal, Power  5/5   Psychology: No hallucination, no delusion, appropriate mood          Lab Review   Recent Results (from the past 24 hour(s))   COVID-19, Rapid    Collection Time: 09/20/22  7:35 PM    Specimen: Nasopharyngeal Swab   Result Value Ref Range    SARS-CoV-2, NAAT Not Detected Not Detected   Comprehensive Metabolic Panel    Collection Time: 09/20/22  7:35 PM   Result Value Ref Range    Sodium 137 136 - 145 mmol/L    Potassium 3.8 3.5 - 5.0 mmol/L    Chloride 100 98 - 111 mmol/L    CO2 26 22 - 29 mmol/L    Anion Gap 11 7 - 19 mmol/L    Glucose 101 74 - 109 mg/dL    BUN 32 (H) 8 - 23 mg/dL    Creatinine 1.4 (H) 0.5 - 0.9 mg/dL    GFR Non- 37 (A) >60    GFR  45 (L) >59    Calcium 14.1 (HH) 8.8 - 10.2 mg/dL    Total Protein 6.6 6.6 - 8.7 g/dL    Albumin 4.1 3.5 - 5.2 g/dL    Total Bilirubin <0.2 0.2 - 1.2 mg/dL    Alkaline Phosphatase 133 (H) 35 - 104 U/L    ALT 16 5 - 33 U/L    AST 20 5 - 32 U/L   CBC with Auto Differential    Collection Time: 09/20/22  7:35 PM   Result Value Ref Range    WBC 6.6 4.8 - 10.8 K/uL    RBC 4.16 (L) 4.20 - 5.40 M/uL    Hemoglobin 10.9 (L) 12.0 - 16.0 g/dL    Hematocrit 35.8 (L) 37.0 - 47.0 %    MCV 86.1 81.0 - 99.0 fL    MCH 26.2 (L) 27.0 - 31.0 pg    MCHC 30.4 (L) 33.0 - 37.0 g/dL    RDW 13.3 11.5 - 14.5 %    Platelets 813 429 - 917 K/uL    MPV 9.6 9.4 - 12.3 fL    Neutrophils % 56.2 50.0 - 65.0 %    Lymphocytes % 26.1 20.0 - 40.0 %    Monocytes % 12.0 (H) 0.0 - 10.0 %    Eosinophils % 4.9 0.0 - 5.0 %    Basophils % 0.6 0.0 - 1.0 %    Neutrophils Absolute 3.7 1.5 - 7.5 K/uL    Immature Granulocytes # 0.0 K/uL    Lymphocytes Absolute 1.7 1.1 - 4.5 K/uL    Monocytes Absolute 0.80 0.00 - 0.90 K/uL    Eosinophils Absolute 0.30 0.00 - 0.60 K/uL    Basophils Absolute 0.00 0.00 - 0.20 K/uL   Magnesium    Collection Time: 09/20/22  7:35 PM   Result Value Ref Range    Magnesium 1.4 (L) 1.6 - 2.4 mg/dL   Calcium, Ionized    Collection Time: 09/20/22  7:49 PM   Result Value Ref Range    Calcium, Ionized 1.83 (HH) 1.12 - 1.32 mmol/L   POCT Glucose    Collection Time: 09/20/22 11:20 PM   Result Value Ref Range    POC Glucose 111 (H) 70 - 99 mg/dl    Performed on AccuChek    Magnesium    Collection Time: 09/21/22  1:21 AM   Result Value Ref Range    Magnesium 1.7 1.6 - 2.4 mg/dL   Phosphorus    Collection Time: 09/21/22  1:21 AM   Result Value Ref Range    Phosphorus 3.4 2.5 - 4.5 mg/dL   Vitamin D 25 Hydroxy    Collection Time: 09/21/22  1:21 AM   Result Value Ref Range    Vit D, 25-Hydroxy 58.6 >=30 ng/mL   Basic Metabolic Panel    Collection Time: 09/21/22  1:21 AM   Result Value Ref Range    Sodium 139 136 - 145 mmol/L    Potassium 3.3 (L) 3.5 - 5.0 mmol/L    Chloride 101 98 - 111 mmol/L    CO2 27 22 - 29 mmol/L    Anion Gap 11 7 - 19 mmol/L    Glucose 165 (H) 74 - 109 mg/dL    BUN 31 (H) 8 - 23 mg/dL    Creatinine 1.3 (H) 0.5 - 0.9 mg/dL    GFR Non- 40 (A) >60    GFR  49 (L) >59    Calcium 13.4 (HH) 8.8 - 10.2 mg/dL   CBC    Collection Time: 09/21/22  1:21 AM   Result Value Ref Range    WBC 6.9 4.8 - 10.8 K/uL    RBC 3.88 (L) 4.20 - 5.40 M/uL    Hemoglobin 10.5 (L) 12.0 - 16.0 g/dL    Hematocrit 33.6 (L) 37.0 - 47.0 %    MCV 86.6 81.0 - 99.0 fL    MCH 27.1 27.0 - 31.0 pg    MCHC 31.3 (L) 33.0 - 37.0 g/dL    RDW 13.4 11.5 - 14.5 %    Platelets 981 221 - 695 K/uL    MPV 10.5 9.4 - 12.3 fL        I/O last 3 completed shifts:  In: -   Out: 1400 [Urine:1400]     amLODIPine  10 mg Oral Daily    atorvastatin  10 mg Oral QPM    cyclobenzaprine  5 mg Oral BID    iron polysaccharides  150 mg Oral Daily    losartan  100 mg Oral Daily    metFORMIN  500 mg Oral QAM AC    rOPINIRole  1 mg Oral Nightly    coenzyme Q10  200 mg Oral Daily    fluticasone  2 spray Each Nostril Daily    estradiol  2 g Vaginal Daily    aspirin EC  81 mg Oral BID    pantoprazole  40 mg Oral BID AC    lactobacillus  1 capsule Oral Daily    trospium  20 mg Oral BID AC    cetirizine  5 mg Oral Daily    sodium chloride flush  10 mL IntraVENous 2 times per day    enoxaparin  40 mg SubCUTAneous Daily    doxycycline hyclate  100 mg Oral BID          Assessment & Plan     Hypercalcemia of unknown etiology  Vitamin D and PTH within normal limit  We will follow-up with PTH RP level to rule out possible malignancy  Will increase IV fluid and Zometa  Nephrology input appreciated we will follow-up with 24-hour urine collection for calcium  Follow-up with [facility] daily    Hypokalemia supplemented we will follow-up with [facility] daily    Comorbid condition  Stage IIIa chronic kidney disease baseline we will avoid nephrotoxic drugs monitor intake and output  Hypertension we will restart home medication and follow-up with vitals  Type 2 diabetes mellitus we will follow-up with A1c and continue home medication    DVT prophylaxis: Enoxaparin    POA  Full Code   Case d/w the RN taking care of the patient  RN  notes reviewed  Consultant notes reviewed     DISPOSITION:    D/C: Home in 1 or 2 days after hypercalcemia has been treated  Estimated D/C Date: 9/24      Soco Middleton MD 9/21/2022 1:39 PM

## 2022-09-22 LAB
ALBUMIN SERPL-MCNC: 3.2 G/DL (ref 3.5–5.2)
ALP BLD-CCNC: 107 U/L (ref 35–104)
ALT SERPL-CCNC: 13 U/L (ref 5–33)
ANION GAP SERPL CALCULATED.3IONS-SCNC: 8 MMOL/L (ref 7–19)
AST SERPL-CCNC: 17 U/L (ref 5–32)
BILIRUB SERPL-MCNC: 0.3 MG/DL (ref 0.2–1.2)
BUN BLDV-MCNC: 26 MG/DL (ref 8–23)
CALCIUM SERPL-MCNC: 11.6 MG/DL (ref 8.8–10.2)
CHLORIDE BLD-SCNC: 107 MMOL/L (ref 98–111)
CO2: 24 MMOL/L (ref 22–29)
CREAT SERPL-MCNC: 1.1 MG/DL (ref 0.5–0.9)
EKG P AXIS: 12 DEGREES
EKG P-R INTERVAL: 156 MS
EKG Q-T INTERVAL: 283 MS
EKG QRS DURATION: 87 MS
EKG QTC CALCULATION (BAZETT): 412 MS
EKG T AXIS: 14 DEGREES
GFR AFRICAN AMERICAN: >59
GFR NON-AFRICAN AMERICAN: 49
GLUCOSE BLD-MCNC: 101 MG/DL (ref 74–109)
HCT VFR BLD CALC: 31 % (ref 37–47)
HEMOGLOBIN: 9.8 G/DL (ref 12–16)
MCH RBC QN AUTO: 27.4 PG (ref 27–31)
MCHC RBC AUTO-ENTMCNC: 31.6 G/DL (ref 33–37)
MCV RBC AUTO: 86.6 FL (ref 81–99)
PDW BLD-RTO: 13.2 % (ref 11.5–14.5)
PLATELET # BLD: 189 K/UL (ref 130–400)
PMV BLD AUTO: 10.3 FL (ref 9.4–12.3)
POTASSIUM SERPL-SCNC: 4 MMOL/L (ref 3.5–5)
RBC # BLD: 3.58 M/UL (ref 4.2–5.4)
SODIUM BLD-SCNC: 139 MMOL/L (ref 136–145)
TOTAL PROTEIN: 5.4 G/DL (ref 6.6–8.7)
WBC # BLD: 6 K/UL (ref 4.8–10.8)

## 2022-09-22 PROCEDURE — 6360000002 HC RX W HCPCS: Performed by: HOSPITALIST

## 2022-09-22 PROCEDURE — 6370000000 HC RX 637 (ALT 250 FOR IP): Performed by: INTERNAL MEDICINE

## 2022-09-22 PROCEDURE — 36415 COLL VENOUS BLD VENIPUNCTURE: CPT

## 2022-09-22 PROCEDURE — 1210000000 HC MED SURG R&B

## 2022-09-22 PROCEDURE — 2580000003 HC RX 258: Performed by: HOSPITALIST

## 2022-09-22 PROCEDURE — 6370000000 HC RX 637 (ALT 250 FOR IP): Performed by: HOSPITALIST

## 2022-09-22 PROCEDURE — 2500000003 HC RX 250 WO HCPCS: Performed by: HOSPITALIST

## 2022-09-22 PROCEDURE — 85027 COMPLETE CBC AUTOMATED: CPT

## 2022-09-22 PROCEDURE — 80053 COMPREHEN METABOLIC PANEL: CPT

## 2022-09-22 PROCEDURE — 2580000003 HC RX 258: Performed by: INTERNAL MEDICINE

## 2022-09-22 RX ORDER — KETOTIFEN FUMARATE 0.35 MG/ML
1 SOLUTION/ DROPS OPHTHALMIC 2 TIMES DAILY
Status: DISCONTINUED | OUTPATIENT
Start: 2022-09-22 | End: 2022-09-23 | Stop reason: HOSPADM

## 2022-09-22 RX ORDER — METOPROLOL TARTRATE 5 MG/5ML
5 INJECTION INTRAVENOUS ONCE
Status: COMPLETED | OUTPATIENT
Start: 2022-09-22 | End: 2022-09-22

## 2022-09-22 RX ADMIN — ATORVASTATIN CALCIUM 10 MG: 10 TABLET, FILM COATED ORAL at 17:37

## 2022-09-22 RX ADMIN — Medication 200 MG: at 07:57

## 2022-09-22 RX ADMIN — SODIUM CHLORIDE: 9 INJECTION, SOLUTION INTRAVENOUS at 14:49

## 2022-09-22 RX ADMIN — DOXYCYCLINE HYCLATE 100 MG: 100 CAPSULE ORAL at 21:02

## 2022-09-22 RX ADMIN — CYCLOBENZAPRINE 5 MG: 10 TABLET, FILM COATED ORAL at 20:19

## 2022-09-22 RX ADMIN — ASPIRIN 81 MG: 81 TABLET, COATED ORAL at 20:19

## 2022-09-22 RX ADMIN — ONDANSETRON 4 MG: 2 INJECTION INTRAMUSCULAR; INTRAVENOUS at 06:21

## 2022-09-22 RX ADMIN — TROSPIUM CHLORIDE 20 MG: 20 TABLET, FILM COATED ORAL at 17:38

## 2022-09-22 RX ADMIN — AMLODIPINE BESYLATE 10 MG: 10 TABLET ORAL at 08:05

## 2022-09-22 RX ADMIN — ENOXAPARIN SODIUM 40 MG: 100 INJECTION SUBCUTANEOUS at 07:56

## 2022-09-22 RX ADMIN — METOPROLOL TARTRATE 5 MG: 1 INJECTION, SOLUTION INTRAVENOUS at 06:32

## 2022-09-22 RX ADMIN — METFORMIN HYDROCHLORIDE 500 MG: 500 TABLET ORAL at 08:01

## 2022-09-22 RX ADMIN — SODIUM CHLORIDE: 9 INJECTION, SOLUTION INTRAVENOUS at 20:22

## 2022-09-22 RX ADMIN — PANTOPRAZOLE SODIUM 40 MG: 40 TABLET, DELAYED RELEASE ORAL at 17:38

## 2022-09-22 RX ADMIN — ASPIRIN 81 MG: 81 TABLET, COATED ORAL at 07:57

## 2022-09-22 RX ADMIN — SODIUM CHLORIDE: 9 INJECTION, SOLUTION INTRAVENOUS at 07:54

## 2022-09-22 RX ADMIN — ESTRADIOL 2 G: 0.1 CREAM VAGINAL at 07:56

## 2022-09-22 RX ADMIN — KETOTIFEN FUMARATE 1 DROP: 0.35 SOLUTION/ DROPS OPHTHALMIC at 20:19

## 2022-09-22 RX ADMIN — CETIRIZINE HYDROCHLORIDE 5 MG: 10 TABLET, FILM COATED ORAL at 07:57

## 2022-09-22 RX ADMIN — SODIUM CHLORIDE, PRESERVATIVE FREE 10 ML: 5 INJECTION INTRAVENOUS at 08:02

## 2022-09-22 RX ADMIN — Medication 1 CAPSULE: at 07:57

## 2022-09-22 RX ADMIN — Medication 150 MG: at 08:01

## 2022-09-22 RX ADMIN — DOXYCYCLINE HYCLATE 100 MG: 100 CAPSULE ORAL at 08:15

## 2022-09-22 RX ADMIN — CYCLOBENZAPRINE 5 MG: 10 TABLET, FILM COATED ORAL at 07:57

## 2022-09-22 RX ADMIN — ROPINIROLE HYDROCHLORIDE 1 MG: 1 TABLET, FILM COATED ORAL at 20:19

## 2022-09-22 RX ADMIN — KETOTIFEN FUMARATE 1 DROP: 0.35 SOLUTION/ DROPS OPHTHALMIC at 12:28

## 2022-09-22 RX ADMIN — FLUTICASONE PROPIONATE 2 SPRAY: 50 SPRAY, METERED NASAL at 07:56

## 2022-09-22 RX ADMIN — TROSPIUM CHLORIDE 20 MG: 20 TABLET, FILM COATED ORAL at 08:01

## 2022-09-22 RX ADMIN — LOSARTAN POTASSIUM 100 MG: 100 TABLET, FILM COATED ORAL at 08:00

## 2022-09-22 ASSESSMENT — PAIN SCALES - GENERAL: PAINLEVEL_OUTOF10: 3

## 2022-09-22 ASSESSMENT — PAIN - FUNCTIONAL ASSESSMENT: PAIN_FUNCTIONAL_ASSESSMENT: ACTIVITIES ARE NOT PREVENTED

## 2022-09-22 ASSESSMENT — PAIN DESCRIPTION - DESCRIPTORS: DESCRIPTORS: ACHING

## 2022-09-22 ASSESSMENT — PAIN DESCRIPTION - ONSET: ONSET: ON-GOING

## 2022-09-22 ASSESSMENT — PAIN DESCRIPTION - PAIN TYPE: TYPE: ACUTE PAIN

## 2022-09-22 ASSESSMENT — PAIN DESCRIPTION - ORIENTATION: ORIENTATION: INNER

## 2022-09-22 ASSESSMENT — PAIN DESCRIPTION - FREQUENCY: FREQUENCY: INTERMITTENT

## 2022-09-22 ASSESSMENT — PAIN DESCRIPTION - LOCATION: LOCATION: HEAD

## 2022-09-22 NOTE — CARE COORDINATION
Late entry for visit 9/21 09/21/22 1400   Service Assessment   Patient Orientation Alert and Oriented   Cognition Alert   History Provided By Patient   Primary Caregiver Self   Accompanied By/Relationship spouse at [address] [employer] is: Named in [facility]   PCP Verified by CM Yes   Last Visit to PCP Within last year   Prior Functional Level Independent in ADLs/IADLs   Current Functional Level Independent in ADLs/IADLs   Can patient return to prior living arrangement Yes   Ability to make needs known: Good   Family able to assist with home care needs: Yes   Would you like for me to discuss the discharge plan with any other family members/significant others, and if so, who? Yes  (okay to speak to spouse)   Financial Resources Medicare   Social/Functional History   Lives With Spouse   Type of [address] Multi-level;Performs ADL's on one level   [facility] to enter with rails   [facility]   Homemaking Assistance Independent   Ambulation Assistance Independent   Transfer Assistance Independent   Active  Yes   Occupation Retired   Discharge Planning   Type of [address] Prior To Admission None   Potential Assistance Needed N/A   DME Ordered?  No   Potential Assistance Purchasing Medications No   Type of Home Care Services None   Patient expects to be discharged to: House   One/Two Story Residence Split level   Services At/After Discharge   Mode of Transport at Discharge Other (see comment)  (spouse)   Confirm Follow Up Transport Self   Electronically signed by Cheli Cote on 9/22/2022 at 6:13 PM

## 2022-09-22 NOTE — PROGRESS NOTES
Date:2022  Patient: Néstor Carpenter  : 1950  [facility]:[id number]  CODE:                                                                        Vicki Livingston MD    Admit Date: 2022  7:02 PM   LOS: 2 days     Hospital course :   HPI  Incidental finding in the lab shows serum calcium was 14.5 mg. She was directed to go to the hospital emergency 2 times and has been treated with calcitonin and IV fluid however the repeat calcium remained high and finally patient is admitted for further evaluation. Patient denies any use of Tums or excessive milk intake or cheese product or even ice cream.  She has been drinking more water to keep herself hydrated. Patient denies any history of cancer. Most recent work-up as an outpatient consistent with normal studies except PTH RP is still pending. She is currently being treated with IV fluid and also given Zometa 4 mg last night. Her  is at the bedside. Patient reports fatigue, lack of energy and tiredness.     Subjective: discuss about the present management and labs follow-up, with 24-hour urine collection to rule out different causes of hypercalcemia, she verbalizes understanding although feels sleepy while talking to me.      seen and evaluated at bedside she is still drowsy but waking up and effectively communicating with us,  at the bedside, discussed about left eyelid swelling, discussed with RN at bedside about allergy conjunctivitis    Review of Systems    Reviewed 12 system and found most of them negative except as stated above in subjective note    Objective:      Vital signs in last 24 hours:  Patient Vitals for the past 24 hrs:   BP Temp Temp src Pulse Resp SpO2   22 1145 (!) 123/54 99.1 °F (37.3 °C) -- (!) 102 -- 91 %   22 0805 (!) 145/86 -- -- -- -- --   22 0645 (!) 145/86 -- -- 94 -- --   22 0621 (!) 197/95 99 °F (37.2 °C) -- (!) 134 17 95 %   22 0230 -- -- -- (!) 110 -- --   22 0030 (!) 158/62 98.1 °F (36.7 °C) Oral (!) 116 17 97 %   09/21/22 1618 (!) 145/70 98.1 °F (36.7 °C) Oral (!) 101 16 98 %         Patient examined with appropriate PPE  Physical Exam:  Vital Signs: BP (!) 123/54   Pulse (!) 102   Temp 99.1 °F (37.3 °C)   Resp 17   Ht 5' 6\" (1.676 m)   Wt 177 lb 11.2 oz (80.6 kg)   SpO2 91%   BMI 28.68 kg/m²   General appearance:. Lying comfortably in bed ,   HEENT: Normocephalic , Atraumatic, PERRL, JVP not raised, left both in upper and lower eyelid swelling with mild erythema of the conjunctiva no tenderness, discharge positive  Chest: On inspection no use of accessory muscles of respiration, on auscultation vesicular breath sounds equal bilaterally no rales rhonchi or wheezing   cardiac: Regular rate and rhythm, S1, S2 normal. No murmurs, gallops, or rubs auscultated. Abdomen:soft, non-tender; normal bowel sounds, no masses, no organomegaly. Urogenital : no leyva, no suprapubic tenderness, no CVA tenderness  Extremities: No clubbing or cyanosis. No peripheral edema. Peripheral pulses palpable.   Neurologic: Alert and Cooperative , cranial nerves grossly intact, DTR equal, Power  5/5   Psychology: No hallucination, no delusion, appropriate mood          Lab Review   Recent Results (from the past 24 hour(s))   CBC    Collection Time: 09/22/22  2:01 AM   Result Value Ref Range    WBC 6.0 4.8 - 10.8 K/uL    RBC 3.58 (L) 4.20 - 5.40 M/uL    Hemoglobin 9.8 (L) 12.0 - 16.0 g/dL    Hematocrit 31.0 (L) 37.0 - 47.0 %    MCV 86.6 81.0 - 99.0 fL    MCH 27.4 27.0 - 31.0 pg    MCHC 31.6 (L) 33.0 - 37.0 g/dL    RDW 13.2 11.5 - 14.5 %    Platelets 296 866 - 405 K/uL    MPV 10.3 9.4 - 12.3 fL   Comprehensive Metabolic Panel    Collection Time: 09/22/22  2:01 AM   Result Value Ref Range    Sodium 139 136 - 145 mmol/L    Potassium 4.0 3.5 - 5.0 mmol/L    Chloride 107 98 - 111 mmol/L    CO2 24 22 - 29 mmol/L    Anion Gap 8 7 - 19 mmol/L    Glucose 101 74 - 109 mg/dL    BUN 26 (H) 8 - 23 mg/dL Creatinine 1.1 (H) 0.5 - 0.9 mg/dL    GFR Non- 49 (A) >60    GFR African American >59 >59    Calcium 11.6 (H) 8.8 - 10.2 mg/dL    Total Protein 5.4 (L) 6.6 - 8.7 g/dL    Albumin 3.2 (L) 3.5 - 5.2 g/dL    Total Bilirubin 0.3 0.2 - 1.2 mg/dL    Alkaline Phosphatase 107 (H) 35 - 104 U/L    ALT 13 5 - 33 U/L    AST 17 5 - 32 U/L        I/O last 3 completed shifts: In: 5691.8 [P.O.:570;  I.V.:5064.8; IV Piggyback:57]  Out: 2500 [Urine:2500]     ketotifen  1 drop Left Eye BID    amLODIPine  10 mg Oral Daily    atorvastatin  10 mg Oral QPM    cyclobenzaprine  5 mg Oral BID    iron polysaccharides  150 mg Oral Daily    losartan  100 mg Oral Daily    metFORMIN  500 mg Oral QAM AC    rOPINIRole  1 mg Oral Nightly    coenzyme Q10  200 mg Oral Daily    fluticasone  2 spray Each Nostril Daily    estradiol  2 g Vaginal Daily    aspirin EC  81 mg Oral BID    pantoprazole  40 mg Oral BID AC    lactobacillus  1 capsule Oral Daily    trospium  20 mg Oral BID AC    cetirizine  5 mg Oral Daily    sodium chloride flush  10 mL IntraVENous 2 times per day    enoxaparin  40 mg SubCUTAneous Daily    doxycycline hyclate  100 mg Oral BID          Assessment & Plan     Hypercalcemia of unknown etiology  Vitamin D and PTH within normal limit  We will follow-up with PTH RP level to rule out possible malignancy  Will increase IV fluid and Zometa  SPEP done as outpatient is negative  Nephrology input appreciated we will follow-up with 24-hour urine collection for calcium  Follow-up with BMP daily    Hypokalemia supplemented we will follow-up with BMP daily> resolved    Allergic conjunctivitis of the left eye  9/22 started on Zaditor eyedrop    Comorbid condition  Stage IIIa chronic kidney disease baseline we will avoid nephrotoxic drugs monitor intake and output  Hypertension we will restart home medication and follow-up with vitals  Type 2 diabetes mellitus we will follow-up with A1c and continue home medication    DVT prophylaxis: Enoxaparin    POA  Full Code   Case d/w the RN taking care of the patient  RN  notes reviewed  Consultant notes reviewed     DISPOSITION:    D/C: Home in 1 or 2 days after hypercalcemia has been treated  Estimated D/C Date: 9/24      Dev Melo MD 9/22/2022 4:07 PM

## 2022-09-22 NOTE — PLAN OF CARE
Problem: Discharge Planning  Goal: Discharge to home or other facility with appropriate resources  Outcome: Progressing  Flowsheets (Taken 9/21/2022 2119)  Discharge to home or other facility with appropriate resources:   Identify barriers to discharge with patient and caregiver   Arrange for needed discharge resources and transportation as appropriate   Identify discharge learning needs (meds, wound care, etc)   Refer to discharge planning if patient needs post-hospital services based on physician order or complex needs related to functional status, cognitive ability or social support system  Note: Continue to monitor daily calcium levels and treat appropriately, they appear to be trending down at this time.; Pt ambulating to bedside commode upx1 at this time.      Problem: Safety - Adult  Goal: Free from fall injury  Outcome: Progressing  Flowsheets (Taken 9/21/2022 2006)  Free From Fall Injury: Instruct family/caregiver on patient safety

## 2022-09-22 NOTE — CARE COORDINATION
Late entry for visit 9/21      Patient Contact Information:    Felix Crystal  [phone] (home)   Telephone Information:   Mobile [phone]     Above information verified? [x]   Yes  []   No      Emergency Contacts:    Extended Emergency Contact Information  Primary Emergency Contact: [place]Martin   [address] Phone: [phone]  Mobile Phone: [phone]  Relation: Spouse      Have you been vaccinated for COVID-19 (SARS-CoV-2)? [x]   Yes  []   No                   If so, when? Which :         []   Pfizer-BioNTech  [x]   Moderna  []   Rita Post  []   Other:         Pharmacy:    [facility], [address] [phone] Boni [place] [phone] [address] [facility] [address]  Phone: [phone] Fax: WendyIgor Dickey [facility], [place] - [address] [phone] Boni [place]ales [phone]  [address]  [address] [place] [address]  Phone: [phone] Fax: [phone]          Patient Deficits:    []   Yes   [x]   No    If yes:    []   Confusion/Memory  []   Visual  []   Motor/Sensory         []   Right arm         []   Right leg         []   Left arm         []   Left leg  []   Language/Speech         []   Aphasia         []   Dysarthria         []   Swallow         Skip Coma Scale  Eye Opening: Spontaneous  Best Verbal Response: Oriented  Best Motor Response: Obeys commands  [place] Coma Scale Score: 13         SW visited with Pt and spouse, at bedside, re: d/c planning;  Pt noted she is mostly IND with ADL and IADL needs; has a walker but doesn't use/need it; still able to drive; more than one level to the home; but she can manage needs on one level;   Electronically signed by ELENA Khalil on 9/22/2022 at 6:08 PM

## 2022-09-22 NOTE — PLAN OF CARE
Problem: Discharge Planning  Goal: Discharge to home or other facility with appropriate resources  Outcome: Progressing  Flowsheets (Taken 9/22/2022 0805)  Discharge to home or other facility with appropriate resources: Identify barriers to discharge with patient and caregiver     Problem: Safety - Adult  Goal: Free from fall injury  Outcome: Progressing  Flowsheets (Taken 9/22/2022 0805)  Free From Fall Injury: Instruct family/caregiver on patient safety     Problem: Chronic Conditions and Co-morbidities  Goal: Patient's chronic conditions and co-morbidity symptoms are monitored and maintained or improved  Outcome: Progressing  Flowsheets (Taken 9/22/2022 0805)  Care Plan - Patient's Chronic Conditions and Co-Morbidity Symptoms are Monitored and Maintained or Improved: Monitor and assess patient's chronic conditions and comorbid symptoms for stability, deterioration, or improvement     Problem: ABCDS Injury Assessment  Goal: Absence of physical injury  Outcome: Progressing  Flowsheets (Taken 9/22/2022 0805)  Absence of Physical Injury: Implement safety measures based on patient assessment     Problem: Pain  Goal: Verbalizes/displays adequate comfort level or baseline comfort level  Outcome: Progressing

## 2022-09-22 NOTE — PROGRESS NOTES
Nephrology ([address] [facility]) Progress Note      Patient:  Ronny Tenorio  YOB: 1950  Date of Service: 9/22/2022  MRN: [id number]   Acct: [de-identified]   Primary Care Physician: Sonu De Jesus MD  Advance Directive: Full Code  Admit Date: 9/20/2022       Hospital Day: 2  Referring Provider: Blas Lisa MD    Patient independently seen and examined, Chart, Consults, Notes, Operative notes, Labs, Cardiology, and Radiology studies reviewed as available. Chief complaint: Abnormal labs. Subjective:  Ronny Tenorio is a 71 y.o. female for whom we were consulted for evaluation and treatment of HYPERCALCEMIA. Hypercalcemia never been her problem, recently recognized on routine lab in last few weeks. She follows LISA Marsh at our office. She has chronic kidney disease, hypertension, type 2 diabetes and osteoarthritis. Incidental finding in the lab shows serum calcium was 14.5 mg. She was directed to go to the hospital.  Patient denies any use of Tums or excessive milk intake or cheese product or even ice cream.  She has been drinking more water to keep herself hydrated. Patient denies any history of cancer. Most recent work-up as an outpatient consistent with normal studies except PTH RP is still pending. She is currently being treated with IV fluid and also given Zometa 4 mg last night. Her  is at the bedside. Patient reports fatigue, lack of energy and tiredness. This morning she feels much better. Her renal function as well as serum calcium is improving. Allergies:  Patient has no known allergies.     Medicines:  Current Facility-Administered Medications   Medication Dose Route Frequency Provider Last Rate Last Admin    ketotifen (ZADITOR) 0.025 % ophthalmic solution 1 drop  1 drop Left Eye BID Blas Lisa MD        amLODIPine (NORVASC) tablet 10 mg  10 mg Oral Daily Ely Gr MD   10 mg at 09/22/22 0805    atorvastatin (LIPITOR) tablet 10 mg  10 mg Oral QPM Shaheen Rico MD   10 mg at 09/21/22 1812    cyclobenzaprine (FLEXERIL) tablet 5 mg  5 mg Oral BID Shaheen Rico MD   5 mg at 09/22/22 0757    iron polysaccharides (NIFEREX) capsule 150 mg  150 mg Oral Daily Shaheen Rico MD   150 mg at 09/22/22 0801    losartan (COZAAR) tablet 100 mg  100 mg Oral Daily Shaheen Rico MD   100 mg at 09/22/22 0800    metFORMIN (GLUCOPHAGE) tablet 500 mg  500 mg Oral QAM AC Shaheen Rico MD   500 mg at 09/22/22 0801    rOPINIRole (REQUIP) tablet 1 mg  1 mg Oral Nightly Shaheen Rico MD   1 mg at 09/21/22 2005    coenzyme Q10 capsule 200 mg  200 mg Oral Daily Shaheen Rico MD   200 mg at 09/22/22 0757    fluticasone (FLONASE) 50 MCG/ACT nasal spray 2 spray  2 spray Each Nostril Daily Shaheen Rico MD   2 spray at 09/22/22 0756    estradiol (ESTRACE) vaginal cream 2 g  2 g Vaginal Daily Shaheen Rico MD   2 g at 09/22/22 0756    aspirin EC tablet 81 mg  81 mg Oral BID Shaheen Rico MD   81 mg at 09/22/22 0757    pantoprazole (PROTONIX) tablet 40 mg  40 mg Oral BID AC Shaheen Rico MD   40 mg at 09/21/22 1809    lactobacillus (CULTURELLE) capsule 1 capsule  1 capsule Oral Daily Shaheen Rico MD   1 capsule at 09/22/22 0757    trospium (SANCTURA) tablet 20 mg  20 mg Oral BID AC Shaheen Rico MD   20 mg at 09/22/22 0801    cetirizine (ZYRTEC) tablet 5 mg  5 mg Oral Daily Shaheen Rico MD   5 mg at 09/22/22 0757    sodium chloride flush 0.9 % injection 10 mL  10 mL IntraVENous 2 times per day Shaheen Rico MD   10 mL at 09/22/22 0802    sodium chloride flush 0.9 % injection 10 mL  10 mL IntraVENous PRN Shaheen Rico MD        0.9 % sodium chloride infusion   IntraVENous PRN Shaheen Rico MD        potassium chloride (KLOR-CON M) extended release tablet 40 mEq  40 mEq Oral PRN Shaheen Rico MD        Or    potassium bicarb-citric acid (EFFER-K) effervescent tablet 40 mEq  40 mEq Oral PRN Shaheen Rico MD        Or    potassium chloride 10 mEq/100 mL IVPB (Peripheral Line)  10 mEq IntraVENous PRN Martínez Chan MD magnesium sulfate 1000 mg in dextrose 5% 100 mL IVPB  1,000 mg IntraVENous PRN Shaheen Rico MD        enoxaparin (LOVENOX) injection 40 mg  40 mg SubCUTAneous Daily Shaheen Rico MD   40 mg at 09/22/22 0756    ondansetron (ZOFRAN-ODT) disintegrating tablet 4 mg  4 mg Oral Q8H PRN Shaheen Rico MD        Or    ondansetron (ZOFRAN) injection 4 mg  4 mg IntraVENous Q6H PRN Shaheen Rico MD   4 mg at 09/22/22 6594    polyethylene glycol (GLYCOLAX) packet 17 g  17 g Oral Daily PRN Shaheen Rico MD        acetaminophen (TYLENOL) tablet 650 mg  650 mg Oral Q6H PRN Shaheen Rico MD        Or    acetaminophen (TYLENOL) suppository 650 mg  650 mg Rectal Q6H PRN Shaheen Rico MD        0.9 % sodium chloride infusion   IntraVENous Continuous Kianna MD Fidelia 150 mL/hr at 09/22/22 0754 New Bag at 09/22/22 0754    doxycycline hyclate (VIBRAMYCIN) capsule 100 mg  100 mg Oral BID Shaheen Rico MD   100 mg at 09/22/22 0815       Past Medical History:  Past Medical History:   Diagnosis Date    Carotid stenosis     Colonic polyp     DM (diabetes mellitus) ([facility] [address]     Essential hypertension     Gastric ulcer     duodenal    GERD (gastroesophageal reflux disease)     Hiatal hernia     Hyperlipidemia     Iron deficiency anemia secondary to blood loss (chronic)     OA (osteoarthritis)     Status post dilatation of esophageal stricture        Past Surgical History:  Past Surgical History:   Procedure Laterality Date    ANKLE SURGERY Left     ORIF    COLONOSCOPY  01/2018    Dr Anita Preciado Right 7/21/2022    RIGHT KNEE EXPLANT & PLACEMENT OF ANTIBIOTIC SPACER performed by Duane Sin, MD at [facility] ENDOSCOPY  06/11/2018    Dr Kamille Alejandre       Family History  Family History   Problem Relation Age of Onset    Colon Cancer Mother     Liver Cancer Mother         mets    Dementia Father        Social History  Social History     Socioeconomic History Marital status:      Spouse name: Not on file    Number of children: Not on file    Years of education: Not on file    Highest education level: Not on file   Occupational History    Not on file   Tobacco Use    Smoking status: Never    Smokeless tobacco: Never   Substance and Sexual Activity    Alcohol use: Never    Drug use: Not on file    Sexual activity: Not on file   Other Topics Concern    Not on file   Social History Narrative    Not on file     Social Determinants of Health     Financial Resource Strain: Not on file   Food Insecurity: Not on file   Transportation Needs: Not on file   Physical Activity: Not on file   Stress: Not on file   Social Connections: Not on file   Intimate Partner Violence: Not on file   Housing Stability: Not on file         Review of Systems:  History obtained from chart review and the patient  General ROS: No fever or chills  Respiratory ROS: No cough, shortness of breath, wheezing  Cardiovascular ROS: No chest pain or palpitations  Gastrointestinal ROS: No abdominal pain or melena  Genito-Urinary ROS: No dysuria or hematuria  Musculoskeletal ROS: No joint pain or swelling   14 point ROS reviewed with the patient and negative except as noted above and in the HPI unless unable to obtain.     Objective:  Patient Vitals for the past 24 hrs:   BP Temp Temp src Pulse Resp SpO2   09/22/22 1145 (!) 123/54 99.1 °F (37.3 °C) -- (!) 102 -- 91 %   09/22/22 0805 (!) 145/86 -- -- -- -- --   09/22/22 0645 (!) 145/86 -- -- 94 -- --   09/22/22 0621 (!) 197/95 99 °F (37.2 °C) -- (!) 134 17 95 %   09/22/22 0230 -- -- -- (!) 110 -- --   09/22/22 0030 (!) 158/62 98.1 °F (36.7 °C) Oral (!) 116 17 97 %   09/21/22 1618 (!) 145/70 98.1 °F (36.7 °C) Oral (!) 101 16 98 %   09/21/22 1203 138/60 97.5 °F (36.4 °C) Oral 93 14 97 %       Intake/Output Summary (Last 24 hours) at 9/22/2022 1154  Last data filed at 9/22/2022 0418  Gross per 24 hour   Intake 5241.82 ml   Output 1100 ml   Net 4141.82 ml General: awake/alert   HEENT: Normocephalic atraumatic head  Neck: Supple with no JVD or carotid bruits. Chest:  clear to auscultation bilaterally  CVS: regular rate and rhythm  Abdominal: soft, nontender, normal bowel sounds  Extremities: no cyanosis or edema  Skin: warm and dry without rash      Labs:  BMP:   Recent Labs     09/20/22 1935 09/21/22 0121 09/22/22 0201    139 139   K 3.8 3.3* 4.0    101 107   CO2 26 27 24   PHOS  --  3.4  --    BUN 32* 31* 26*   CREATININE 1.4* 1.3* 1.1*   CALCIUM 14.1* 13.4* 11.6*     CBC:   Recent Labs     09/20/22 1935 09/21/22 0121 09/22/22 0201   WBC 6.6 6.9 6.0   HGB 10.9* 10.5* 9.8*   HCT 35.8* 33.6* 31.0*   MCV 86.1 86.6 86.6    249 189     LIVER PROFILE:   Recent Labs     09/20/22 1200 09/20/22 1935 09/22/22 0201   AST 22 20 17   ALT 17 16 13   BILITOT 0.4 <0.2 0.3   ALKPHOS 133* 133* 107*     PT/INR: No results for input(s): PROTIME, INR in the last 72 hours. APTT: No results for input(s): APTT in the last 72 hours. BNP:  No results for input(s): BNP in the last 72 hours. Ionized Calcium:No results for input(s): IONCA in the last 72 hours. Magnesium:  Recent Labs     09/20/22 1935 09/21/22 0121   MG 1.4* 1.7     Phosphorus:  Recent Labs     09/21/22 0121   PHOS 3.4     HgbA1C: No results for input(s): LABA1C in the last 72 hours. Hepatic:   Recent Labs     09/20/22  1200 09/20/22 1935 09/22/22 0201   ALKPHOS 133* 133* 107*   ALT 17 16 13   AST 22 20 17   PROT 6.9 6.6 5.4*   BILITOT 0.4 <0.2 0.3   LABALBU 4.4 4.1 3.2*     Lactic Acid: No results for input(s): LACTA in the last 72 hours. Troponin: No results for input(s): CKTOTAL, CKMB, TROPONINT in the last 72 hours. ABGs: No results for input(s): PH, PCO2, PO2, HCO3, O2SAT in the last 72 hours. CRP:  No results for input(s): CRP in the last 72 hours. Sed Rate:  No results for input(s): SEDRATE in the last 72 hours.       Cultures:   No results for input(s): CULTURE in the last 72 hours. No results for input(s): BC, Sara Coats in the last 72 hours. No results for input(s): CXSURG in the last 72 hours. Radiology reports as per the Radiologist  Radiology: XR CHEST PORTABLE    Result Date: 9/20/2022  NO PRIOR REPORT AVAILABLE Exam: X-RAY OF [facility] Clinical data:Cough. Technique:Single view of the chest. Prior studies: No prior studies submitted. Findings: The lungs are grossly clear; noevidence of acute infiltrate or pleural effusion. Cardiac silhouette is within normal limits. No acute osseous abnormality is detected. Chest radiograph is unremarkable. Recommendation: Follow up as clinically indicated. Electronically Signed by Raiza Singh at 20-Sep-2022 11:11:46 PM                Assessment   1. HYPERCALCEMIA/unknown etiology. 2.  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 Stage IIIa chronic kidney disease baseline. 4.  Type II diabetic nephropathy. 5.  Poorly controlled hypertension. Plan:  1. Continue IV fluid. 2.  Follow-up on PTH RP level. 3.  24-hour urine collection in progress. 4.  Plan was discussed with Dr. Krunal Garcia.       Amaury Salinas MD  09/22/22  11:54 AM

## 2022-09-22 NOTE — PROGRESS NOTES
PCA accidentally emptied voided urine into the toilet instead of the 24 hour specimen container. Restarting the 24 hour period.

## 2022-09-22 NOTE — ACP (ADVANCE CARE PLANNING)
Advance Care Planning   Healthcare Decision Maker:    Primary Decision Maker: Martin Ríos - Spouse - [phone]    [place]:  Patient has Durable POA, Designation og [address] [facility] for [address] [place] [address] Road; the Surrogate is now the Primary DM.       Electronically signed by Zoya Jaime  [email or web address] on 9/22/2022 at 12:03 PM

## 2022-09-23 VITALS
WEIGHT: 177.7 LBS | HEART RATE: 93 BPM | HEIGHT: 66 IN | DIASTOLIC BLOOD PRESSURE: 66 MMHG | SYSTOLIC BLOOD PRESSURE: 139 MMHG | BODY MASS INDEX: 28.56 KG/M2 | RESPIRATION RATE: 18 BRPM | OXYGEN SATURATION: 97 % | TEMPERATURE: 98.1 F

## 2022-09-23 LAB
24HR URINE VOLUME (ML): 1790 ML
ALBUMIN SERPL-MCNC: 3.2 G/DL (ref 3.5–5.2)
ALP BLD-CCNC: 96 U/L (ref 35–104)
ALT SERPL-CCNC: 16 U/L (ref 5–33)
ANION GAP SERPL CALCULATED.3IONS-SCNC: 8 MMOL/L (ref 7–19)
AST SERPL-CCNC: 24 U/L (ref 5–32)
BILIRUB SERPL-MCNC: 0.3 MG/DL (ref 0.2–1.2)
BUN BLDV-MCNC: 20 MG/DL (ref 8–23)
CALCIUM 24 HOUR URINE: 136 MG/24HR (ref 100–300)
CALCIUM SERPL-MCNC: 10 MG/DL (ref 8.8–10.2)
CHLORIDE BLD-SCNC: 105 MMOL/L (ref 98–111)
CO2: 21 MMOL/L (ref 22–29)
CREAT SERPL-MCNC: 1.1 MG/DL (ref 0.5–0.9)
CREATININE 24 HOUR URINE: 0.6 G/24HR (ref 1–2)
GFR AFRICAN AMERICAN: >59
GFR NON-AFRICAN AMERICAN: 49
GLUCOSE BLD-MCNC: 88 MG/DL (ref 74–109)
HCT VFR BLD CALC: 29.2 % (ref 37–47)
HEMOGLOBIN: 9.1 G/DL (ref 12–16)
MCH RBC QN AUTO: 27.2 PG (ref 27–31)
MCHC RBC AUTO-ENTMCNC: 31.2 G/DL (ref 33–37)
MCV RBC AUTO: 87.2 FL (ref 81–99)
PDW BLD-RTO: 13.2 % (ref 11.5–14.5)
PLATELET # BLD: 151 K/UL (ref 130–400)
PMV BLD AUTO: 10.1 FL (ref 9.4–12.3)
POTASSIUM SERPL-SCNC: 3.8 MMOL/L (ref 3.5–5)
RBC # BLD: 3.35 M/UL (ref 4.2–5.4)
SODIUM BLD-SCNC: 134 MMOL/L (ref 136–145)
TOTAL PROTEIN: 5.2 G/DL (ref 6.6–8.7)
WBC # BLD: 4.4 K/UL (ref 4.8–10.8)

## 2022-09-23 PROCEDURE — 6370000000 HC RX 637 (ALT 250 FOR IP): Performed by: HOSPITALIST

## 2022-09-23 PROCEDURE — 6360000002 HC RX W HCPCS: Performed by: HOSPITALIST

## 2022-09-23 PROCEDURE — 85027 COMPLETE CBC AUTOMATED: CPT

## 2022-09-23 PROCEDURE — 2580000003 HC RX 258: Performed by: HOSPITALIST

## 2022-09-23 PROCEDURE — 6370000000 HC RX 637 (ALT 250 FOR IP): Performed by: INTERNAL MEDICINE

## 2022-09-23 PROCEDURE — 80053 COMPREHEN METABOLIC PANEL: CPT

## 2022-09-23 PROCEDURE — 2580000003 HC RX 258: Performed by: INTERNAL MEDICINE

## 2022-09-23 PROCEDURE — 36415 COLL VENOUS BLD VENIPUNCTURE: CPT

## 2022-09-23 RX ORDER — KETOTIFEN FUMARATE 0.35 MG/ML
1 SOLUTION/ DROPS OPHTHALMIC 2 TIMES DAILY
Qty: 0.2 ML | Refills: 0 | Status: SHIPPED | OUTPATIENT
Start: 2022-09-23 | End: 2022-09-25

## 2022-09-23 RX ORDER — ALENDRONATE SODIUM 70 MG/1
70 TABLET ORAL
Qty: 4 TABLET | Refills: 3 | Status: SHIPPED | OUTPATIENT
Start: 2022-09-23

## 2022-09-23 RX ORDER — AMLODIPINE BESYLATE 10 MG/1
5 TABLET ORAL NIGHTLY
Qty: 30 TABLET | Refills: 3 | Status: SHIPPED | OUTPATIENT
Start: 2022-09-23 | End: 2022-10-25

## 2022-09-23 RX ADMIN — ESTRADIOL 2 G: 0.1 CREAM VAGINAL at 08:34

## 2022-09-23 RX ADMIN — CYCLOBENZAPRINE 5 MG: 10 TABLET, FILM COATED ORAL at 08:35

## 2022-09-23 RX ADMIN — ACETAMINOPHEN 650 MG: 325 TABLET, FILM COATED ORAL at 00:34

## 2022-09-23 RX ADMIN — ASPIRIN 81 MG: 81 TABLET, COATED ORAL at 08:35

## 2022-09-23 RX ADMIN — Medication 200 MG: at 08:35

## 2022-09-23 RX ADMIN — SODIUM CHLORIDE: 9 INJECTION, SOLUTION INTRAVENOUS at 03:06

## 2022-09-23 RX ADMIN — PANTOPRAZOLE SODIUM 40 MG: 40 TABLET, DELAYED RELEASE ORAL at 05:15

## 2022-09-23 RX ADMIN — AMLODIPINE BESYLATE 10 MG: 10 TABLET ORAL at 08:37

## 2022-09-23 RX ADMIN — FLUTICASONE PROPIONATE 2 SPRAY: 50 SPRAY, METERED NASAL at 08:34

## 2022-09-23 RX ADMIN — SODIUM CHLORIDE: 9 INJECTION, SOLUTION INTRAVENOUS at 08:46

## 2022-09-23 RX ADMIN — ENOXAPARIN SODIUM 40 MG: 100 INJECTION SUBCUTANEOUS at 08:34

## 2022-09-23 RX ADMIN — KETOTIFEN FUMARATE 1 DROP: 0.35 SOLUTION/ DROPS OPHTHALMIC at 08:34

## 2022-09-23 RX ADMIN — LOSARTAN POTASSIUM 100 MG: 100 TABLET, FILM COATED ORAL at 08:35

## 2022-09-23 RX ADMIN — Medication 150 MG: at 08:35

## 2022-09-23 RX ADMIN — METFORMIN HYDROCHLORIDE 500 MG: 500 TABLET ORAL at 08:34

## 2022-09-23 RX ADMIN — DOXYCYCLINE HYCLATE 100 MG: 100 CAPSULE ORAL at 08:34

## 2022-09-23 RX ADMIN — TROSPIUM CHLORIDE 20 MG: 20 TABLET, FILM COATED ORAL at 08:34

## 2022-09-23 RX ADMIN — Medication 1 CAPSULE: at 08:35

## 2022-09-23 RX ADMIN — SODIUM CHLORIDE, PRESERVATIVE FREE 10 ML: 5 INJECTION INTRAVENOUS at 08:37

## 2022-09-23 RX ADMIN — CETIRIZINE HYDROCHLORIDE 5 MG: 10 TABLET, FILM COATED ORAL at 08:34

## 2022-09-23 ASSESSMENT — PAIN SCALES - GENERAL: PAINLEVEL_OUTOF10: 1

## 2022-09-23 ASSESSMENT — PAIN DESCRIPTION - LOCATION: LOCATION: ABDOMEN

## 2022-09-23 ASSESSMENT — PAIN DESCRIPTION - DESCRIPTORS: DESCRIPTORS: ACHING

## 2022-09-23 ASSESSMENT — PAIN - FUNCTIONAL ASSESSMENT: PAIN_FUNCTIONAL_ASSESSMENT: ACTIVITIES ARE NOT PREVENTED

## 2022-09-23 ASSESSMENT — PAIN DESCRIPTION - ORIENTATION: ORIENTATION: LOWER

## 2022-09-23 NOTE — DISCHARGE SUMMARY
Discharge Summary    NAME: Courtney Tamayo  :  1950  MRN:  [id number]    Admit date:  2022  Discharge date:      Admitting Physician:  No admitting provider for patient encounter. Advance Directive: Full Code    Consults: nephrology    Primary Care Physician:  Claudio Salter MD    DISCHARGE DIAGNOSES:  Principal Problem:    Hypercalcemia  Active Problems:    Colonic polyp    Type 2 diabetes mellitus with hyperglycemia, without long-term current use of insulin (HCC)    Essential hypertension    GERD (gastroesophageal reflux disease)    Hyperlipidemia    OA (osteoarthritis)    Status post dilatation of esophageal stricture    Iron deficiency anemia secondary to blood loss (chronic)  Resolved Problems:    * No resolved hospital problems. *    Hypercalcemia of unknown etiology  Vitamin D and PTH within normal limit  We will follow-up with PTH RP level to rule out possible malignancy  Will increase IV fluid and Zometa  SPEP done as outpatient is negative  Nephrology input appreciated started on Fosamax 70 mg weekly   follow-up with nephrology with BMP for calcium level, PTH RP, 24-hour urine collection of calcium, and further work-up    Hypokalemia supplemented we will follow-up with BMP daily> resolved     Allergic conjunctivitis of the left eye   started on Zaditor eyedrop   resolving with above eyedrop need to continue for 3 more days    Comorbid condition  Stage IIIa chronic kidney disease baseline we will avoid nephrotoxic drugs monitor intake and output  Hypertension we will restart home medication and follow-up with vitals> started on amlodipine at nighttime follow-up with vitals  Type 2 diabetes mellitus we will follow-up with A1c and continue home medication       HOSPITAL COURSE: HPI  Incidental finding in the lab shows serum calcium was 14.5 mg.   She was directed to go to the hospital emergency 2 times and has been treated with calcitonin and IV fluid however the repeat calcium remained high and finally patient is admitted for further evaluation. Patient denies any use of Tums or excessive milk intake or cheese product or even ice cream.  She has been drinking more water to keep herself hydrated. Patient denies any history of cancer. Most recent work-up as an outpatient consistent with normal studies except PTH RP is still pending. She is currently being treated with IV fluid and also given Zometa 4 mg last night. Her  is at the bedside. Patient reports fatigue, lack of energy and tiredness. 9/21 discuss about the present management and labs follow-up, with 24-hour urine collection to rule out different causes of hypercalcemia, she verbalizes understanding although feels sleepy while talking to me.      9/22 seen and evaluated at bedside she is still drowsy but waking up and effectively communicating with us,  at the bedside, discussed about left eyelid swelling, discussed with RN at bedside about allergy conjunctivitis    9/23 the calcium level back to upper limit of normal, the conjunctivitis is much better discussed the plan of discharge with her she verbalizes understanding and agreed with that. Discussed with RN to collect complete 24-hour urine before she leaves the facility    Physical Exam:  Vital Signs: /66   Pulse 93   Temp 98.1 °F (36.7 °C) (Oral)   Resp 18   Ht 5' 6\" (1.676 m)   Wt 177 lb 11.2 oz (80.6 kg)   SpO2 97%   BMI 28.68 kg/m²   General appearance:. Alert and Cooperative   HEENT: Normocephalic. Chest: clear to auscultation bilaterally without wheezes or rhonchi. Cardiac: Normal heart tones with regular rate and rhythm, S1, S2 normal. No murmurs, gallops, or rubs auscultated. Abdomen:soft, non-tender; normal bowel sounds, no masses, no organomegaly. Extremities: No clubbing or cyanosis. No peripheral edema. Peripheral pulses palpable. Neurologic: Grossly intact.     Significant Diagnostic Studies:   XR CHEST PORTABLE    Result Date: 9/20/2022  NO PRIOR REPORT AVAILABLE Exam: X-RAY OF [facility] Clinical data:Cough. Technique:Single view of the chest. Prior studies: No prior studies submitted. Findings: The lungs are grossly clear; noevidence of acute infiltrate or pleural effusion. Cardiac silhouette is within normal limits. No acute osseous abnormality is detected. Chest radiograph is unremarkable. Recommendation: Follow up as clinically indicated. Electronically Signed by Dominick Leung at 20-Sep-2022 11:11:46 PM               Pertinent Labs: As below  CBC:   Recent Labs     09/21/22  0121 09/22/22  0201 09/23/22 0133   WBC 6.9 6.0 4.4*   HGB 10.5* 9.8* 9.1*    189 151     BMP:    Recent Labs     09/21/22 0121 09/22/22 0201 09/23/22 0133    139 134*   K 3.3* 4.0 3.8    107 105   CO2 27 24 21*   BUN 31* 26* 20   CREATININE 1.3* 1.1* 1.1*   GLUCOSE 165* 101 88     Procedures: None    Discharge Medications:         Medication List        START taking these medications      alendronate 70 MG tablet  Commonly known as: Fosamax  Take 1 tablet by mouth every 7 days Take with a full glass of water upon arising for the day. Consume on an empty stomach at least 30 minutes before the first food, beverage, or medication. Stay upright (do not lie down) for at least 30 minutes. Do not crush or break. amLODIPine 10 MG tablet  Commonly known as: NORVASC  Take 0.5 tablets by mouth nightly     ketotifen 0.025 % ophthalmic solution  Commonly known as: ZADITOR  Place 1 drop into the left eye 2 times daily for 2 days            CONTINUE taking these medications      acetaminophen 325 MG tablet  Commonly known as: TYLENOL     aspirin EC 81 MG EC tablet  Take 1 tablet by mouth in the morning and 1 tablet before bedtime.      atorvastatin 10 MG tablet  Commonly known as: LIPITOR     Coenzyme Q10 10 MG Caps     cyclobenzaprine 5 MG tablet  Commonly known as: FLEXERIL     doxycycline hyclate 100 MG capsule  Commonly known as: VIBRAMYCIN estradiol 0.1 MG/GM vaginal cream  Commonly known as: ESTRACE     HYDROcodone-acetaminophen 5-325 MG per tablet  Commonly known as: NORCO     indapamide 1.25 MG tablet  Commonly known as: LOZOL     iron polysaccharides 150 MG capsule  Commonly known as: NIFEREX     levocetirizine 5 MG tablet  Commonly known as: XYZAL     losartan 100 MG tablet  Commonly known as: COZAAR     metFORMIN 500 MG tablet  Commonly known as: GLUCOPHAGE     omeprazole 20 MG delayed release capsule  Commonly known as: PRILOSEC     PROBIOTIC DAILY PO     rOPINIRole 1 MG tablet  Commonly known as: REQUIP     tolterodine 4 MG extended release capsule  Commonly known as: DETROL LA     vitamin B-12 500 MCG tablet  Commonly known as: CYANOCOBALAMIN            STOP taking these medications      fluticasone 50 MCG/ACT nasal spray  Commonly known as: FLONASE     vitamin C 500 MG tablet  Commonly known as: ASCORBIC ACID               Where to Get Your Medications        These medications were sent to [facility], [address] [place] [address]  [address], [address] [place] [id number]      Phone: [phone]   amLODIPine 10 MG tablet  ketotifen 0.025 % ophthalmic solution       You can get these medications from any pharmacy    Bring a paper prescription for each of these medications  alendronate 70 MG tablet         Discharge Instructions: Follow up with Vin Arana MD in 7 days. Take medications as directed. Resume activity as tolerated. Follow-up with nephrology as outpatient for 24-hour urine results    Diet: ADULT DIET; Regular     Disposition: Patient is medically stable and will be discharged home. Time spent on discharge 35 minutes.     Signed:  Torito Licona MD  9/23/2022 1:33 PM

## 2022-09-23 NOTE — PROGRESS NOTES
CLINICAL PHARMACY NOTE: MEDS TO BEDS    Total # of Prescriptions Filled: 2   The following medications were delivered to the patient:  Amlodipine 10 mg  Eye Itch Relief 0.025% solution     Additional Documentation:   Delivered Rx to patients room. Gave Rx to patients spouse. He paid $9.37 copays with cash.

## 2022-09-23 NOTE — PLAN OF CARE
Problem: Discharge Planning  Goal: Discharge to home or other facility with appropriate resources  9/23/2022 1213 by Zully Hillman RN  Outcome: Progressing  Flowsheets (Taken 9/23/2022 1123)  Discharge to home or other facility with appropriate resources: Identify barriers to discharge with patient and caregiver  9/23/2022 0144 by Fermín Borden RN  Outcome: Progressing  9/23/2022 0143 by Fermín Borden RN  Outcome: Progressing  Flowsheets (Taken 9/22/2022 2152)  Discharge to home or other facility with appropriate resources: Identify barriers to discharge with patient and caregiver     Problem: Safety - Adult  Goal: Free from fall injury  9/23/2022 1213 by Zully Hillman RN  Outcome: Progressing  Flowsheets (Taken 9/23/2022 1123)  Free From Fall Injury: Instruct family/caregiver on patient safety  9/23/2022 0144 by Fermín Borden RN  Outcome: Progressing  9/23/2022 0143 by Fermín Borden RN  Outcome: Progressing     Problem: Chronic Conditions and Co-morbidities  Goal: Patient's chronic conditions and co-morbidity symptoms are monitored and maintained or improved  9/23/2022 1213 by uZlly Hillman RN  Outcome: Progressing  Flowsheets (Taken 9/23/2022 1123)  Care Plan - Patient's Chronic Conditions and Co-Morbidity Symptoms are Monitored and Maintained or Improved: Monitor and assess patient's chronic conditions and comorbid symptoms for stability, deterioration, or improvement  9/23/2022 0144 by Fermín Borden RN  Outcome: Progressing  9/23/2022 0143 by Fermín Borden RN  Outcome: Progressing  Flowsheets (Taken 9/22/2022 2152)  Care Plan - Patient's Chronic Conditions and Co-Morbidity Symptoms are Monitored and Maintained or Improved: Monitor and assess patient's chronic conditions and comorbid symptoms for stability, deterioration, or improvement     Problem: ABCDS Injury Assessment  Goal: Absence of physical injury  9/23/2022 1213 by Zully Hillman RN  Outcome: Progressing  Flowsheets (Taken 9/23/2022 1123)  Absence of Physical Injury: Implement safety measures based on patient assessment  9/23/2022 0144 by Alesia Brooke RN  Outcome: Progressing  9/23/2022 0143 by Alesia Brooke RN  Outcome: Progressing     Problem: Pain  Goal: Verbalizes/displays adequate comfort level or baseline comfort level  9/23/2022 1213 by Letty Roberts RN  Outcome: Progressing  9/23/2022 0144 by Alesia Brooke RN  Outcome: Progressing  9/23/2022 0143 by Alesia Brooke RN  Outcome: Progressing

## 2022-09-23 NOTE — PLAN OF CARE
Problem: Discharge Planning  Goal: Discharge to home or other facility with appropriate resources  9/23/2022 1349 by Domingo Rodriguez RN  Outcome: Completed  9/23/2022 1213 by Gómez Gillis RN  Outcome: Progressing  Flowsheets (Taken 9/23/2022 1123)  Discharge to home or other facility with appropriate resources: Identify barriers to discharge with patient and caregiver  9/23/2022 0144 by Richard Mason RN  Outcome: Progressing  9/23/2022 0143 by Richard Mason RN  Outcome: Progressing  Flowsheets (Taken 9/22/2022 2152)  Discharge to home or other facility with appropriate resources: Identify barriers to discharge with patient and caregiver     Problem: Safety - Adult  Goal: Free from fall injury  9/23/2022 1349 by Domingo Rodriguez RN  Outcome: Completed  9/23/2022 1213 by Gómez Gillis RN  Outcome: Progressing  Flowsheets (Taken 9/23/2022 1123)  Free From Fall Injury: Instruct family/caregiver on patient safety  9/23/2022 0144 by Richard Mason RN  Outcome: Progressing  9/23/2022 0143 by Richard Mason RN  Outcome: Progressing     Problem: Chronic Conditions and Co-morbidities  Goal: Patient's chronic conditions and co-morbidity symptoms are monitored and maintained or improved  9/23/2022 1349 by Domingo Rodriguez RN  Outcome: Completed  9/23/2022 1213 by Gómez Gillis RN  Outcome: Progressing  Flowsheets (Taken 9/23/2022 1123)  Care Plan - Patient's Chronic Conditions and Co-Morbidity Symptoms are Monitored and Maintained or Improved: Monitor and assess patient's chronic conditions and comorbid symptoms for stability, deterioration, or improvement  9/23/2022 0144 by Richard Mason RN  Outcome: Progressing  9/23/2022 0143 by Richard Mason RN  Outcome: Progressing  Flowsheets (Taken 9/22/2022 2152)  Care Plan - Patient's Chronic Conditions and Co-Morbidity Symptoms are Monitored and Maintained or Improved: Monitor and assess patient's chronic conditions and comorbid symptoms for stability, deterioration, or improvement     Problem: ABCDS Injury Assessment  Goal: Absence of physical injury  9/23/2022 1349 by Maurizio Nascimento RN  Outcome: Completed  9/23/2022 1213 by Joelle Bella RN  Outcome: Progressing  Flowsheets (Taken 9/23/2022 1123)  Absence of Physical Injury: Implement safety measures based on patient assessment  9/23/2022 0144 by Davy Moon RN  Outcome: Progressing  9/23/2022 0143 by Davy Moon RN  Outcome: Progressing     Problem: Pain  Goal: Verbalizes/displays adequate comfort level or baseline comfort level  9/23/2022 1349 by Maurizio Nascimento RN  Outcome: Completed  9/23/2022 1213 by Joelle Bella RN  Outcome: Progressing  9/23/2022 0144 by Davy Moon RN  Outcome: Progressing  9/23/2022 0143 by Davy Moon RN  Outcome: Progressing

## 2022-09-23 NOTE — PROGRESS NOTES
Nephrology ([facility]) Progress Note      Patient:  Felicitas Staley  YOB: 1950  Date of Service: 9/23/2022  MRN: [id number]   Acct: [de-identified]   Primary Care Physician: Duran Carney MD  Advance Directive: Full Code  Admit Date: 9/20/2022       Hospital Day: 3  Referring Provider: Dev Melo MD    Patient independently seen and examined, Chart, Consults, Notes, Operative notes, Labs, Cardiology, and Radiology studies reviewed as available. Chief complaint: Abnormal labs. Subjective:  Felicitas Staley is a 71 y.o. female for whom we were consulted for evaluation and treatment of HYPERCALCEMIA. Hypercalcemia never been her problem, recently recognized on routine lab in last few weeks. She follows LISA Campos at our office. She has chronic kidney disease, hypertension, type 2 diabetes and osteoarthritis. Incidental finding in the lab shows serum calcium was 14.5 mg. She was directed to go to the hospital.  Patient denies any use of Tums or excessive milk intake or cheese product or even ice cream.  She has been drinking more water to keep herself hydrated. Patient denies any history of cancer. Most recent work-up as an outpatient consistent with normal studies except PTH RP is still pending. She is currently being treated with IV fluid and also given Zometa 4 mg last night. Her  is at the bedside. Patient reports fatigue, lack of energy and tiredness. This morning patient feels well. Her serum calcium is now back to normal.  She is hoping to go home. Her PTH RP level is still pending. 24-hour urine collection was completed    Allergies:  Patient has no known allergies.     Medicines:  Current Facility-Administered Medications   Medication Dose Route Frequency Provider Last Rate Last Admin    ketotifen (ZADITOR) 0.025 % ophthalmic solution 1 drop  1 drop Left Eye BID eDv Melo MD   1 drop at 09/23/22 0848    amLODIPine (NORVASC) tablet 10 mg  10 mg Oral Daily Brandie Enciso MD   10 mg at 09/23/22 0837    atorvastatin (LIPITOR) tablet 10 mg  10 mg Oral QPM Shaheen Rico MD   10 mg at 09/22/22 1737    cyclobenzaprine (FLEXERIL) tablet 5 mg  5 mg Oral BID Shaheen Rico MD   5 mg at 09/23/22 0835    iron polysaccharides (NIFEREX) capsule 150 mg  150 mg Oral Daily Shaheen Rico MD   150 mg at 09/23/22 0835    losartan (COZAAR) tablet 100 mg  100 mg Oral Daily Shaheen Rico MD   100 mg at 09/23/22 0835    metFORMIN (GLUCOPHAGE) tablet 500 mg  500 mg Oral QAM AC Shaheen Rico MD   500 mg at 09/23/22 0834    rOPINIRole (REQUIP) tablet 1 mg  1 mg Oral Nightly Shaheen Rico MD   1 mg at 09/22/22 2019    coenzyme Q10 capsule 200 mg  200 mg Oral Daily Shaheen Rico MD   200 mg at 09/23/22 0835    fluticasone (FLONASE) 50 MCG/ACT nasal spray 2 spray  2 spray Each Nostril Daily Shaheen Rico MD   2 spray at 09/23/22 0834    estradiol (ESTRACE) vaginal cream 2 g  2 g Vaginal Daily Shaheen Rico MD   2 g at 09/23/22 0834    aspirin EC tablet 81 mg  81 mg Oral BID Shaheen Rico MD   81 mg at 09/23/22 0835    pantoprazole (PROTONIX) tablet 40 mg  40 mg Oral BID AC Shaheen Rico MD   40 mg at 09/23/22 0515    lactobacillus (CULTURELLE) capsule 1 capsule  1 capsule Oral Daily Shaheen Rico MD   1 capsule at 09/23/22 0835    trospium (SANCTURA) tablet 20 mg  20 mg Oral BID AC Shaheen Rico MD   20 mg at 09/23/22 0834    cetirizine (ZYRTEC) tablet 5 mg  5 mg Oral Daily Shaheen Rico MD   5 mg at 09/23/22 0834    sodium chloride flush 0.9 % injection 10 mL  10 mL IntraVENous 2 times per day Shaheen Rico MD   10 mL at 09/23/22 0837    sodium chloride flush 0.9 % injection 10 mL  10 mL IntraVENous PRN Shaheen Rico MD        0.9 % sodium chloride infusion   IntraVENous PRN Shaheen Rico MD        potassium chloride (KLOR-CON M) extended release tablet 40 mEq  40 mEq Oral PRN Shaheen Rico MD        Or    potassium bicarb-citric acid (EFFER-K) effervescent tablet 40 mEq  40 mEq Oral PRN Lewis Meza MD Or    potassium chloride 10 mEq/100 mL IVPB (Peripheral Line)  10 mEq IntraVENous PRN Shaheen Rico MD        magnesium sulfate 1000 mg in dextrose 5% 100 mL IVPB  1,000 mg IntraVENous PRN Shaheen Rico MD        enoxaparin (LOVENOX) injection 40 mg  40 mg SubCUTAneous Daily Shaheen Rico MD   40 mg at 09/23/22 0834    ondansetron (ZOFRAN-ODT) disintegrating tablet 4 mg  4 mg Oral Q8H PRN Shaheen Rico MD        Or    ondansetron (ZOFRAN) injection 4 mg  4 mg IntraVENous Q6H PRN Shaheen Rico MD   4 mg at 09/22/22 4768    polyethylene glycol (GLYCOLAX) packet 17 g  17 g Oral Daily PRN Shaheen Rico MD        acetaminophen (TYLENOL) tablet 650 mg  650 mg Oral Q6H PRN Shaheen Rico MD   650 mg at 09/23/22 0034    Or    acetaminophen (TYLENOL) suppository 650 mg  650 mg Rectal Q6H PRN Shaheen Rico MD        0.9 % sodium chloride infusion   IntraVENous Continuous Claudette Flores  mL/hr at 09/23/22 0846 New Bag at 09/23/22 0846    doxycycline hyclate (VIBRAMYCIN) capsule 100 mg  100 mg Oral BID Shaheen Rico MD   100 mg at 09/23/22 5687       Past Medical History:  Past Medical History:   Diagnosis Date    Carotid stenosis     Colonic polyp     DM (diabetes mellitus) ([facility] [address]     Essential hypertension     Gastric ulcer     duodenal    GERD (gastroesophageal reflux disease)     Hiatal hernia     Hyperlipidemia     Iron deficiency anemia secondary to blood loss (chronic)     OA (osteoarthritis)     Status post dilatation of esophageal stricture        Past Surgical History:  Past Surgical History:   Procedure Laterality Date    ANKLE SURGERY Left     ORIF    COLONOSCOPY  01/2018    Dr Mercy Banuelos Right 7/21/2022    RIGHT KNEE EXPLANT & PLACEMENT OF ANTIBIOTIC SPACER [facility]  06/11/2018    Dr Grossman Prudent       Family History  Family History   Problem Relation Age of Onset    Colon Cancer Mother     Liver Cancer Mother         mets    Dementia Father        Social History  Social History     Socioeconomic History    Marital status:      Spouse name: Not on file    Number of children: Not on file    Years of education: Not on file    Highest education level: Not on file   Occupational History    Not on file   Tobacco Use    Smoking status: Never    Smokeless tobacco: Never   Substance and Sexual Activity    Alcohol use: Never    Drug use: Not on file    Sexual activity: Not on file   Other Topics Concern    Not on file   Social History Narrative    Not on file     Social Determinants of Health     Financial Resource Strain: Not on file   Food Insecurity: Not on file   Transportation Needs: Not on file   Physical Activity: Not on file   Stress: Not on file   Social Connections: Not on file   Intimate Partner Violence: Not on file   Housing Stability: Not on file         Review of Systems:  History obtained from chart review and the patient  General ROS: No fever or chills  Respiratory ROS: No cough, shortness of breath, wheezing  Cardiovascular ROS: No chest pain or palpitations  Gastrointestinal ROS: No abdominal pain or melena  Genito-Urinary ROS: No dysuria or hematuria  Musculoskeletal ROS: No joint pain or swelling   14 point ROS reviewed with the patient and negative except as noted above and in the HPI unless unable to obtain.     Objective:  Patient Vitals for the past 24 hrs:   BP Temp Temp src Pulse Resp SpO2   09/23/22 0837 127/65 -- -- -- -- --   09/23/22 0517 127/65 97.9 °F (36.6 °C) -- 94 18 95 %   09/23/22 0215 -- 98.6 °F (37 °C) Oral (!) 101 -- --   09/23/22 0026 (!) 148/66 99.1 °F (37.3 °C) -- (!) 107 18 92 %   09/22/22 1739 (!) 152/65 98.4 °F (36.9 °C) Axillary (!) 107 17 96 %   09/22/22 1145 (!) 123/54 99.1 °F (37.3 °C) -- (!) 102 -- 91 %       Intake/Output Summary (Last 24 hours) at 9/23/2022 1009  Last data filed at 9/23/2022 0938  Gross per 24 hour   Intake 465 ml   Output 1650 ml   Net -1185 ml     General: awake/alert   HEENT: Normocephalic atraumatic head  Neck: Supple with no JVD or carotid bruits. Chest:  clear to auscultation bilaterally  CVS: regular rate and rhythm  Abdominal: soft, nontender, normal bowel sounds  Extremities: no cyanosis or edema  Skin: warm and dry without rash      Labs:  BMP:   Recent Labs     09/21/22 0121 09/22/22 0201 09/23/22 0133    139 134*   K 3.3* 4.0 3.8    107 105   CO2 27 24 21*   PHOS 3.4  --   --    BUN 31* 26* 20   CREATININE 1.3* 1.1* 1.1*   CALCIUM 13.4* 11.6* 10.0     CBC:   Recent Labs     09/21/22 0121 09/22/22 0201 09/23/22 0133   WBC 6.9 6.0 4.4*   HGB 10.5* 9.8* 9.1*   HCT 33.6* 31.0* 29.2*   MCV 86.6 86.6 87.2    189 151     LIVER PROFILE:   Recent Labs     09/20/22 1935 09/22/22 0201 09/23/22 0133   AST 20 17 24   ALT 16 13 16   BILITOT <0.2 0.3 0.3   ALKPHOS 133* 107* 96     PT/INR: No results for input(s): PROTIME, INR in the last 72 hours. APTT: No results for input(s): APTT in the last 72 hours. BNP:  No results for input(s): BNP in the last 72 hours. Ionized Calcium:No results for input(s): IONCA in the last 72 hours. Magnesium:  Recent Labs     09/20/22 1935 09/21/22 0121   MG 1.4* 1.7     Phosphorus:  Recent Labs     09/21/22 0121   PHOS 3.4     HgbA1C: No results for input(s): LABA1C in the last 72 hours. Hepatic:   Recent Labs     09/20/22 1935 09/22/22 0201 09/23/22 0133   ALKPHOS 133* 107* 96   ALT 16 13 16   AST 20 17 24   PROT 6.6 5.4* 5.2*   BILITOT <0.2 0.3 0.3   LABALBU 4.1 3.2* 3.2*     Lactic Acid: No results for input(s): LACTA in the last 72 hours. Troponin: No results for input(s): CKTOTAL, CKMB, TROPONINT in the last 72 hours. ABGs: No results for input(s): PH, PCO2, PO2, HCO3, O2SAT in the last 72 hours. CRP:  No results for input(s): CRP in the last 72 hours. Sed Rate:  No results for input(s): SEDRATE in the last 72 hours.       Cultures:   No results for input(s): CULTURE in the last 72 hours. No results for input(s): BC, Liam Stacks in the last 72 hours. No results for input(s): CXSURG in the last 72 hours. Radiology reports as per the Radiologist  Radiology: XR CHEST PORTABLE    Result Date: 9/20/2022  NO PRIOR REPORT AVAILABLE Exam: X-RAY OF [facility] Clinical data:Cough. Technique:Single view of the chest. Prior studies: No prior studies submitted. Findings: The lungs are grossly clear; noevidence of acute infiltrate or pleural effusion. Cardiac silhouette is within normal limits. No acute osseous abnormality is detected. Chest radiograph is unremarkable. Recommendation: Follow up as clinically indicated. Electronically Signed by Rika Groves at 20-Sep-2022 11:11:46 PM                Assessment   1. HYPERCALCEMIA/improved. 2.  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 Stage IIIa chronic kidney disease baseline. 4.  Type II diabetic nephropathy. 5.  Poorly controlled hypertension. Plan:  1. Discontinue IV fluid. 2.  Start Fosamax 70 mg every week  3. Okay to discharge her and follow-up needed next week with LISA Balderas.       Narayan Solorio MD  09/23/22  10:09 AM

## 2022-09-23 NOTE — CARE COORDINATION
09/23/22 1732   IMM Letter   IMM Letter given to Patient/Family/Significant other/Guardian/POA/by: SW gave letter to Pt and explained; Ana Bennett; Pt didn't wish to wait 4h to d/c   IMM Letter date given: 09/23/22   IMM Letter time given: 1200   Electronically signed by ELENA Negron on 9/23/2022 at 5:33 PM

## 2022-09-23 NOTE — PLAN OF CARE
Problem: Discharge Planning  Goal: Discharge to home or other facility with appropriate resources  9/23/2022 0143 by Charles Davis RN  Outcome: Progressing  Flowsheets (Taken 9/22/2022 2152)  Discharge to home or other facility with appropriate resources: Identify barriers to discharge with patient and caregiver  9/22/2022 1540 by Blayne Piper RN  Outcome: Progressing  Flowsheets (Taken 9/22/2022 0805)  Discharge to home or other facility with appropriate resources: Identify barriers to discharge with patient and caregiver     Problem: Safety - Adult  Goal: Free from fall injury  9/23/2022 0143 by Charles Davis RN  Outcome: Progressing  9/22/2022 1540 by Blayne Piper RN  Outcome: Progressing  Flowsheets (Taken 9/22/2022 0805)  Free From Fall Injury: Instruct family/caregiver on patient safety     Problem: Chronic Conditions and Co-morbidities  Goal: Patient's chronic conditions and co-morbidity symptoms are monitored and maintained or improved  9/23/2022 0143 by Charles Davis RN  Outcome: Progressing  Flowsheets (Taken 9/22/2022 2152)  Care Plan - Patient's Chronic Conditions and Co-Morbidity Symptoms are Monitored and Maintained or Improved: Monitor and assess patient's chronic conditions and comorbid symptoms for stability, deterioration, or improvement  9/22/2022 1540 by Blayne Piper RN  Outcome: Progressing  Flowsheets (Taken 9/22/2022 0805)  Care Plan - Patient's Chronic Conditions and Co-Morbidity Symptoms are Monitored and Maintained or Improved: Monitor and assess patient's chronic conditions and comorbid symptoms for stability, deterioration, or improvement     Problem: ABCDS Injury Assessment  Goal: Absence of physical injury  9/23/2022 0143 by Charles Davis RN  Outcome: Progressing  9/22/2022 1540 by Blayne Piper RN  Outcome: Progressing  Flowsheets (Taken 9/22/2022 0805)  Absence of Physical Injury: Implement safety measures based on patient assessment     Problem: Pain  Goal: Verbalizes/displays adequate comfort level or baseline comfort level  9/23/2022 0143 by Dayana Ingram RN  Outcome: Progressing  9/22/2022 1540 by Luis Carlos Friedman RN  Outcome: Progressing

## 2022-09-29 LAB
ALBUMIN SERPL-MCNC: 4.4 G/DL (ref 3.5–5.2)
ALP BLD-CCNC: 141 U/L (ref 35–104)
ALT SERPL-CCNC: 20 U/L (ref 5–33)
ANION GAP SERPL CALCULATED.3IONS-SCNC: 8 MMOL/L (ref 7–19)
AST SERPL-CCNC: 25 U/L (ref 5–32)
BASOPHILS ABSOLUTE: 0 K/UL (ref 0–0.2)
BASOPHILS RELATIVE PERCENT: 0.5 % (ref 0–1)
BILIRUB SERPL-MCNC: 0.3 MG/DL (ref 0.2–1.2)
BUN BLDV-MCNC: 16 MG/DL (ref 8–23)
C-REACTIVE PROTEIN: 1.05 MG/DL (ref 0–0.5)
CALCIUM SERPL-MCNC: 10.7 MG/DL (ref 8.8–10.2)
CHLORIDE BLD-SCNC: 103 MMOL/L (ref 98–111)
CO2: 32 MMOL/L (ref 22–29)
CREAT SERPL-MCNC: 1.2 MG/DL (ref 0.5–0.9)
EOSINOPHILS ABSOLUTE: 0.4 K/UL (ref 0–0.6)
EOSINOPHILS RELATIVE PERCENT: 4.9 % (ref 0–5)
GFR AFRICAN AMERICAN: 53
GFR NON-AFRICAN AMERICAN: 44
GLUCOSE BLD-MCNC: 86 MG/DL (ref 74–109)
HCT VFR BLD CALC: 36.1 % (ref 37–47)
HEMOGLOBIN: 11.1 G/DL (ref 12–16)
IMMATURE GRANULOCYTES #: 0 K/UL
LYMPHOCYTES ABSOLUTE: 2.1 K/UL (ref 1.1–4.5)
LYMPHOCYTES RELATIVE PERCENT: 25.3 % (ref 20–40)
MAGNESIUM: 1.6 MG/DL (ref 1.6–2.4)
MCH RBC QN AUTO: 26.7 PG (ref 27–31)
MCHC RBC AUTO-ENTMCNC: 30.7 G/DL (ref 33–37)
MCV RBC AUTO: 87 FL (ref 81–99)
MONOCYTES ABSOLUTE: 0.6 K/UL (ref 0–0.9)
MONOCYTES RELATIVE PERCENT: 7.4 % (ref 0–10)
NEUTROPHILS ABSOLUTE: 5 K/UL (ref 1.5–7.5)
NEUTROPHILS RELATIVE PERCENT: 61.5 % (ref 50–65)
PDW BLD-RTO: 13.7 % (ref 11.5–14.5)
PLATELET # BLD: 326 K/UL (ref 130–400)
PMV BLD AUTO: 9.6 FL (ref 9.4–12.3)
POTASSIUM SERPL-SCNC: 3.7 MMOL/L (ref 3.5–5)
RBC # BLD: 4.15 M/UL (ref 4.2–5.4)
SODIUM BLD-SCNC: 143 MMOL/L (ref 136–145)
TOTAL PROTEIN: 7 G/DL (ref 6.6–8.7)
WBC # BLD: 8.1 K/UL (ref 4.8–10.8)

## 2022-10-07 LAB — PTH RELATED PEPTIDE: 3.1 PMOL/L (ref 0–3.4)

## 2022-10-14 LAB — PTH RELATED PEPTIDE: 4.9 PMOL/L (ref 0–3.4)

## 2022-10-21 NOTE — PROGRESS NOTES
Genella Schilder   1950  10/25/2022     Chief Complaint   Patient presents with    Follow-up     Iron deficiency anemia secondary to blood loss (chronic)          INTERVAL HISTORY/HISTORY OF PRESENT ILLNESS:  The patient is a pleasant 71years old female who has been diagnosed with iron deficiency anemia. In addition, she has chronic kidney disease stage IIIa. She has a history hypertension/diabetes. He was hospitalized 3 times here at [facility] and also [facility] with hypercalcemia. Apparently, PTH was low. PTH related protein was high. SPEP was unremarkable. Vitamin D levels were normal.  The patient had a colonoscopy last year that was unremarkable. She denies any new breast complaints. Denies any dysphagia. Denies any new respiratory symptoms. She is a non-smoker. She received Zometa in the hospital with normalization of her calcium levels. Last CMP 3/19/2022 showed calcium levels 8.9. I called and discussed with Dr. Gerson Reece, nephrology today. He is faxing for the labs to our clinic regarding work-up she has had so far. I also asked him if we can order a CT scan with contrast.  Dr. Gerson Reece agreed to proceed with CT scan with contrast  Diagnosis   Iron deficiency secondary to upper GI ulcers   Normal colonoscopy January 2018. Chronic kidney disease stage IIIb  Treatment summary  PO Niferex every other day   Sep 2018-Injectafer 1500mg      Interval history  Mrs. Indigo Hampton is a 71year-old  female with a history of iron deficiency anemia secondary to GI blood loss from gastric ulcers. . She continues to take PO iron replacement, Niferex, once daily and tolerating without significant difficulty. She was recently seen by her primary care provider. Her hemoglobin was 10.6. She was taking Niferex every other day and was instructed to take it daily. She has no new complaints today. Denies any  melena, hematochezia, change in the color of her stools.   She denies any epigastric pain.  She denies any use of NSAIDs. She denies any hematuria. CBC today showed improvement of hemoglobin to 11.0. HEMATOLOGY HISTORY: Iron deficiency anemia 2/2 upper GI ulcers  Zabrina Rivera was seen in initial hematology consultation on 4/67/7497 referred by Yves GONZALEZ for persistent iron deficiency anemia. Zabrina Rivera reports that she was started on Niferex 150 mg daily on 11/13/2017. A CBC that is available from 11/28/2017 revealed a WBC of 6, Hgb 10.8, MCV 86, PLT of 254,000. Serum iron was 57. Most recent follow-up endoscopy was performed on 6/11/2018 which revealed 2 superficial gastric ulcers within the antrum of the stomach and healing of the former duodenal ulcers. Mild to moderate gastritis of the antrum of the stomach was noted. She did have a distal esophageal stricture that was dilated. 1/29/2018-normal colonoscopy. Upper EGD showed 7 gastric ulcers and 4 duodenal ulcers. CBC 2/13/2018 revealed a WBC of 6.7, Hgb 10.4 with MCV 85.3 and PLT of 222,000.   07/20/2018-WBC 8.2, hemoglobin 11.5, PLT of 213,000. Ferritin 52, iron saturation 14%, TIBC 258.   9/5/2018-recommended 2 doses of IV Injectafer 750 mg. September 2018- s/p 1500mg Injectafer IV.   10/23/2018- CBC Hb 11.8/MCV90. Normal WBC/Plts. 1/22/2019-CBC with hemoglobin 11.6.   7/23/2019- CBC with a hemoglobin of 11.9 and MCV 92.1, continues to take Niferex daily  01/21/2020- CBC Hb 11.4/91. WBC 5.2, Platelets 899F. Iron 46, TIBC 234, iron saturation 20%  4/6/2021-B12 461  4/27/2021-hemoglobin 11, WBC 5.9, platelets 280,025  7/54/0966-DCICLGCL Niferex 100 mg p.o. daily  4/22/2022- hemoglobin 0.8, creatinine 1.4, GFR 39, ferritin 255, TIBC 225, iron saturation 18%.   4/26/2022- resume iron sulfate 325 mg p.o. daily        PAST MEDICAL HISTORY:   Past Medical History:   Diagnosis Date    Carotid stenosis     Colonic polyp     DM (diabetes mellitus) ([facility]     Essential hypertension     Gastric ulcer     duodenal    GERD (gastroesophageal reflux disease)     Hiatal hernia     Hyperlipidemia     Iron deficiency anemia secondary to blood loss (chronic)     OA (osteoarthritis)     Status post dilatation of esophageal stricture           PAST SURGICAL HISTORY:  Past Surgical History:   Procedure Laterality Date    ANKLE SURGERY Left     ORIF    COLONOSCOPY  01/2018    Dr Mathew Herrera Right 7/21/2022    RIGHT KNEE EXPLANT & PLACEMENT OF ANTIBIOTIC SPACER performed by Munir Stubbs MD at [address] ENDOSCOPY  06/11/2018    Dr [facility]aley[facility]        SOCIAL HISTORY:  Social History     Socioeconomic History    Marital status:      Spouse name: None    Number of children: None    Years of education: None    Highest education level: None   Tobacco Use    Smoking status: Never    Smokeless tobacco: Never   Substance and Sexual Activity    Alcohol use: Never       FAMILY HISTORY:  Family History   Problem Relation Age of Onset    Colon Cancer Mother     Liver Cancer Mother         mets    Dementia Father         Current Outpatient Medications   Medication Sig Dispense Refill    alendronate (FOSAMAX) 70 MG tablet Take 1 tablet by mouth every 7 days Take with a full glass of water upon arising for the day. Consume on an empty stomach at least 30 minutes before the first food, beverage, or medication. Stay upright (do not lie down) for at least 30 minutes. Do not crush or break. 4 tablet 3    doxycycline hyclate (VIBRAMYCIN) 100 MG capsule Take 100 mg by mouth 2 times daily      HYDROcodone-acetaminophen (NORCO) 5-325 MG per tablet Take 1 tablet by mouth every 6 hours as needed for Pain. acetaminophen (TYLENOL) 325 MG tablet Take 650 mg by mouth every 8 hours as needed for Pain      vitamin B-12 (CYANOCOBALAMIN) 500 MCG tablet Take 500 mcg by mouth every 48 hours      aspirin EC 81 MG EC tablet Take 1 tablet by mouth in the morning and 1 tablet before bedtime.  60 tablet 0 estradiol (ESTRACE) 0.1 MG/GM vaginal cream Place 2 g vaginally daily Indications: Vaginitis      Probiotic Product (PROBIOTIC DAILY PO) Take 1 tablet by mouth daily Indications: Treatment for the Prevention of Constipation      omeprazole (PRILOSEC) 20 MG delayed release capsule Take 20 mg by mouth in the morning. Indications: Gastroesophageal Reflux Disease. atorvastatin (LIPITOR) 10 MG tablet Take 10 mg by mouth every evening Indications: High Amount of Fats in the Blood      cyclobenzaprine (FLEXERIL) 5 MG tablet Take 5 mg by mouth in the morning and 5 mg before bedtime. Indications: Restless Leg Syndrome. iron polysaccharides (NIFEREX) 150 MG capsule Take 150 mg by mouth in the morning. Indications: Anemia.      indapamide (LOZOL) 1.25 MG tablet Take 1.25 mg by mouth in the morning. Indications: Treatment with Diuretic Therapy. losartan (COZAAR) 100 MG tablet Take 100 mg by mouth daily      metFORMIN (GLUCOPHAGE) 500 MG tablet Take 500 mg by mouth every morning (before breakfast) Indications: Diabetes      rOPINIRole (REQUIP) 1 MG tablet Take 1 mg by mouth nightly      tolterodine (DETROL LA) 4 MG extended release capsule Take 4 mg by mouth nightly Indications: Bladder Spasm      levocetirizine (XYZAL) 5 MG tablet Take 5 mg by mouth daily as needed Indications: Allergic Rhinitis      Coenzyme Q10 10 MG CAPS Take 200 mg by mouth daily Indications: Treatment for the Prevention of Constipation       No current facility-administered medications for this visit. REVIEW OF SYSTEMS:   CONSTITUTIONAL: no fever, no night sweats, fatigue, recent weight loss-15lbs;   HEENT: no blurring of vision, no double vision, no hearing difficulty, no tinnitus, no ulceration, no dysplasia, no epistaxis;   LUNGS: no cough, no hemoptysis, no wheeze,  no shortness of breath;  CARDIOVASCULAR: no palpitation, no chest pain, no shortness of breath;  GI: no abdominal pain, no nausea, no vomiting, no diarrhea, no constipation;  ELROY: no dysuria, no hematuria, no frequency or urgency, no nephrolithiasis;  MUSCULOSKELETAL: no joint pain, no swelling, no stiffness;  ENDOCRINE: no polyuria, no polydipsia, no cold or heat intolerance;  HEMATOLOGY: no easy bruising or bleeding, no history of clotting disorder;  DERMATOLOGY: no skin rash, no eczema, no pruritus;  PSYCHIATRY: no depression, no anxiety, no panic attacks, no suicidal ideation, no homicidal ideation;  NEUROLOGY: no syncope, no seizures, no numbness or tingling of hands, no numbness or tingling of feet, no paresis;     Vitals signs:  BP (!) 144/62   Pulse (!) 101   Ht 5' 6\" (1.676 m)   Wt 180 lb (81.6 kg)   SpO2 96%   BMI 29.05 kg/m²    Pain scale:  Pain Score:   0 - No pain   PHYSICAL EXAM:  Chaperoned physical exam with Tiara Altamirano RN  CONSTITUTIONAL: Alert, appropriate, no acute distress,   EYES: Non icteric, EOM intact, pupils equal round and reactive to light and accommodation. ENT: Oral mucus membranes moist, no oral pharyngeal lesions. External inspection of ears and nose are normal.   NECK: Supple, no masses. No palpable thyroid mass    CHEST/LUNGS: CTA bilaterally, normal respiratory effort   CARDIOVASCULAR: tchycardic, no murmurs. No lower extremity edema   ABDOMEN: soft non-tender, active bowel sounds, no hepatosplenomegaly. No palpable masses. EXTREMITIES: warm, Full ROM of all fours extremities. No focal weakness. SKIN: warm, dry with no rashes or lesions  LYMPH: No cervical, clavicular, axillary, or inguinal lymphadenopathy  NEUROLOGIC: follows commands, non focal.   PSYCH: mood and affect appropriate. Alert and oriented to time and place and person.     Relevant Lab findings/reviewed by me:  10/19/2022 Iron Studies ([facility]):Iron 45, TIBC 279, Iron Sat 16, Ferritn 201    10/19/2022 CBC [facility])    10/19/2022 CMP [facility])    Relevant Imaging studies/reviewed by me:  None    ASSESSMENT    Orders Placed This Encounter   Procedures    Comprehensive Metabolic Panel     -FAX RESULTS TO DR Ishaan Read [phone]     Standing Status:   Future     Standing Expiration Date:   10/25/2023        Ayah Pal was seen today for follow-up. Diagnoses and all orders for this visit:    Anemia in stage 3 chronic kidney disease, unspecified whether stage 3a or 3b CKD ([facility]    Care plan discussed with patient    Hypercalcemia  -     Cancel: PTH, Intact; Future  -     Comprehensive Metabolic Panel; Future         History of iron deficiency anemia secondary to upper GI ulcers (healed): Anemia is multifactorial to include CKD stage IIIa, anemia of iron deficiency, anemia of chronic disease  CBC today reveals a hemoglobin 10.3  April 2022-ferritin 201, iron saturation 45%, TIBC 279  Creatinine 1.24, GFR 55  -Hemoglobin 10.3     B12 deficiency-continue oral B12 replacement. DM type II-last hemoglobin A1c 7    Hypertension-systolic blood pressure slightly higher  -Educated about keeping her blood pressure under control  BP (!) 144/62   Pulse (!) 101   Ht 5' 6\" (1.676 m)   Wt 180 lb (81.6 kg)   SpO2 96%   BMI 29.05 kg/m²     Hypercalcemia-concern for hypercalcemia of malignancy  - Hospitalized calcium 14, [address] 4 mg IV was given in the hospital  -Calcium improved, normalized 8.9 on 10/21/2022  -SPEP and free light chains. Normal kappa/lambda ratio  -Normal vitamin D levels  -PTH reportedly low  -PTH-r protein elevated 4.9 on 09/21    Summary of Plan:  RTC with MD in [place] office 6 months  CT chest, abdomen, pelvis ASAP  Repeat CMP next week with PCP  Discontinue OTC Ferrous Sulfate daily 325 mg  Follow-up with nephrology    Follow Up:     Return for CBC, Appointment with Dr. Salty Davalos  in [place]. CT c/a/p ASAP       I, Aracelis Willett am pre charting  as Medical Assistant for Jackie Guillen MD. Electronically signed by Aracelis Willett MA on 10/25/2022 at 12:28 PM CDT.     River Tejeda am scribing for Jackie Guillen MD. Electronically signed by Joyce Gill RN on 10/25/2022 at 10:32 AM CDT. I, Dr Genaro Linn, personally performed the services described in this documentation as scribed by Joyce Gill RN in my presence and is both accurate and complete. I have seen, examined and reviewed this patient medication list, appropriate labs and imaging studies. I reviewed relevant medical records and others physicians notes. I discussed the plans of care with the patient. I answered all the questions to the patients satisfaction. I have also reviewed the chief complaint (CC) and part of the history (History of Present Illness (HPI), Past Family Social History [facility]), or Review of Systems (ROS) and made changes when appropriated.        (Please note that portions of this note were completed with a voice recognition program. Efforts were made to edit the dictations but occasionally words are mis-transcribed.)  Electronically signed by Perez Pineda MD on 10/25/2022 at 10:37 AM

## 2022-10-25 ENCOUNTER — OFFICE VISIT (OUTPATIENT)
Dept: HEMATOLOGY | Age: 72
End: 2022-10-25
Payer: MEDICARE

## 2022-10-25 VITALS
DIASTOLIC BLOOD PRESSURE: 62 MMHG | HEART RATE: 101 BPM | OXYGEN SATURATION: 96 % | HEIGHT: 66 IN | SYSTOLIC BLOOD PRESSURE: 144 MMHG | BODY MASS INDEX: 28.93 KG/M2 | WEIGHT: 180 LBS

## 2022-10-25 DIAGNOSIS — E83.52 HYPERCALCEMIA: ICD-10-CM

## 2022-10-25 DIAGNOSIS — Z71.89 CARE PLAN DISCUSSED WITH PATIENT: ICD-10-CM

## 2022-10-25 DIAGNOSIS — N18.30 ANEMIA IN STAGE 3 CHRONIC KIDNEY DISEASE, UNSPECIFIED WHETHER STAGE 3A OR 3B CKD (HCC): Primary | ICD-10-CM

## 2022-10-25 DIAGNOSIS — D63.1 ANEMIA IN STAGE 3 CHRONIC KIDNEY DISEASE, UNSPECIFIED WHETHER STAGE 3A OR 3B CKD (HCC): Primary | ICD-10-CM

## 2022-10-25 PROCEDURE — G8484 FLU IMMUNIZE NO ADMIN: HCPCS | Performed by: INTERNAL MEDICINE

## 2022-10-25 PROCEDURE — G8400 PT W/DXA NO RESULTS DOC: HCPCS | Performed by: INTERNAL MEDICINE

## 2022-10-25 PROCEDURE — 1123F ACP DISCUSS/DSCN MKR DOCD: CPT | Performed by: INTERNAL MEDICINE

## 2022-10-25 PROCEDURE — G8417 CALC BMI ABV UP PARAM F/U: HCPCS | Performed by: INTERNAL MEDICINE

## 2022-10-25 PROCEDURE — 1036F TOBACCO NON-USER: CPT | Performed by: INTERNAL MEDICINE

## 2022-10-25 PROCEDURE — 1090F PRES/ABSN URINE INCON ASSESS: CPT | Performed by: INTERNAL MEDICINE

## 2022-10-25 PROCEDURE — 3017F COLORECTAL CA SCREEN DOC REV: CPT | Performed by: INTERNAL MEDICINE

## 2022-10-25 PROCEDURE — G8427 DOCREV CUR MEDS BY ELIG CLIN: HCPCS | Performed by: INTERNAL MEDICINE

## 2022-10-25 PROCEDURE — 99214 OFFICE O/P EST MOD 30 MIN: CPT | Performed by: INTERNAL MEDICINE

## 2022-10-26 ENCOUNTER — HOSPITAL ENCOUNTER (OUTPATIENT)
Dept: CT IMAGING | Age: 72
Discharge: HOME | End: 2022-10-26
Payer: MEDICARE

## 2022-10-26 DIAGNOSIS — N18.30 ANEMIA IN STAGE 3 CHRONIC KIDNEY DISEASE, UNSPECIFIED WHETHER STAGE 3A OR 3B CKD (HCC): ICD-10-CM

## 2022-10-26 DIAGNOSIS — D63.1 ANEMIA IN STAGE 3 CHRONIC KIDNEY DISEASE, UNSPECIFIED WHETHER STAGE 3A OR 3B CKD (HCC): ICD-10-CM

## 2022-10-26 DIAGNOSIS — E83.52 HYPERCALCEMIA: ICD-10-CM

## 2022-10-26 LAB
ALBUMIN SERPL-MCNC: 4.3 G/DL (ref 3.5–5.2)
ALP BLD-CCNC: 147 U/L (ref 35–104)
ALT SERPL-CCNC: 16 U/L (ref 5–33)
ANION GAP SERPL CALCULATED.3IONS-SCNC: 10 MMOL/L (ref 7–19)
AST SERPL-CCNC: 22 U/L (ref 5–32)
BILIRUB SERPL-MCNC: 0.3 MG/DL (ref 0.2–1.2)
BUN BLDV-MCNC: 28 MG/DL (ref 8–23)
CALCIUM SERPL-MCNC: 9.5 MG/DL (ref 8.8–10.2)
CHLORIDE BLD-SCNC: 106 MMOL/L (ref 98–111)
CO2: 27 MMOL/L (ref 22–29)
CREAT SERPL-MCNC: 1.1 MG/DL (ref 0.5–0.9)
GFR SERPL CREATININE-BSD FRML MDRD: 53 ML/MIN/{1.73_M2}
GFR SERPL CREATININE-BSD FRML MDRD: 53 ML/MIN/{1.73_M2}
GLUCOSE BLD-MCNC: 112 MG/DL (ref 74–109)
POC CREATININE: 1.1 MG/DL (ref 0.3–1.3)
POTASSIUM SERPL-SCNC: 4.9 MMOL/L (ref 3.5–5)
SODIUM BLD-SCNC: 143 MMOL/L (ref 136–145)
TOTAL PROTEIN: 6.5 G/DL (ref 6.6–8.7)

## 2022-10-26 PROCEDURE — 74177 CT ABD & PELVIS W/CONTRAST: CPT

## 2022-10-26 PROCEDURE — 71260 CT THORAX DX C+: CPT

## 2022-10-26 PROCEDURE — 6360000004 HC RX CONTRAST MEDICATION: Performed by: INTERNAL MEDICINE

## 2022-10-26 PROCEDURE — 82565 ASSAY OF CREATININE: CPT

## 2022-10-26 RX ADMIN — IOPAMIDOL 75 ML: 755 INJECTION, SOLUTION INTRAVENOUS at 09:47

## 2022-11-07 NOTE — PROGRESS NOTES
Xochitl Cowart   1950 11/8/2022     Chief Complaint   Patient presents with    Follow-up     Anemia in stage 3 chronic kidney disease, unspecified whether stage 3a or 3b CKD ([address]            INTERVAL HISTORY/HISTORY OF PRESENT ILLNESS:  The patient is a pleasant 71years old female who has been diagnosed with iron deficiency anemia. In addition, she has chronic kidney disease stage IIIa. She has a history hypertension/diabetes. He was hospitalized 3 times here at [facility] and also [facility] with hypercalcemia. PTH was slightly low and PTH related protein was high. SPEP was unremarkable. Vitamin B12 was normal.  The patient had a colonoscopy last year that was unremarkable. She denies any breast complaints. The breast exam was normal and performed by me. She denies any other symptoms. Specifically, no weight loss. No blood in her urine or stools. She had Zometa 1 time in the hospital with normalization of her calcium levels. Last calcium level was 9.4 on 11/8/2022. Diagnosis   Iron deficiency secondary to upper GI ulcers   Normal colonoscopy January 2018. Chronic kidney disease stage IIIb  Hypercalcemia  Treatment summary  PO Niferex every other day   Sep 2018-Injectafer 1500mg      Interval history  Mrs. Renée Persaud is a 71year-old  female with a history of iron deficiency anemia secondary to GI blood loss from gastric ulcers. . She continues to take PO iron replacement, Niferex, once daily and tolerating without significant difficulty. She was recently seen by her primary care provider. Her hemoglobin was 10.6. She was taking Niferex every other day and was instructed to take it daily. She has no new complaints today. Denies any  melena, hematochezia, change in the color of her stools. She denies any epigastric pain. She denies any use of NSAIDs. She denies any hematuria. CBC today showed improvement of hemoglobin to 11.0.   HEMATOLOGY HISTORY: Iron deficiency anemia 2/2 upper GI ulcers  Lisa was seen in initial hematology consultation on 8/04/3808 referred by Sonia GONZALEZ for persistent iron deficiency anemia. Lisa reports that she was started on Niferex 150 mg daily on 11/13/2017. A CBC that is available from 11/28/2017 revealed a WBC of 6, Hgb 10.8, MCV 86, PLT of 254,000. Serum iron was 57. Most recent follow-up endoscopy was performed on 6/11/2018 which revealed 2 superficial gastric ulcers within the antrum of the stomach and healing of the former duodenal ulcers. Mild to moderate gastritis of the antrum of the stomach was noted. She did have a distal esophageal stricture that was dilated. 1/29/2018-normal colonoscopy. Upper EGD showed 7 gastric ulcers and 4 duodenal ulcers. CBC 2/13/2018 revealed a WBC of 6.7, Hgb 10.4 with MCV 85.3 and PLT of 222,000.   07/20/2018-WBC 8.2, hemoglobin 11.5, PLT of 213,000. Ferritin 52, iron saturation 14%, TIBC 258.   9/5/2018-recommended 2 doses of IV Injectafer 750 mg. September 2018- s/p 1500mg Injectafer IV.   10/23/2018- CBC Hb 11.8/MCV90. Normal WBC/Plts. 1/22/2019-CBC with hemoglobin 11.6.   7/23/2019- CBC with a hemoglobin of 11.9 and MCV 92.1, continues to take Niferex daily  01/21/2020- CBC Hb 11.4/91. WBC 5.2, Platelets 434V. Iron 46, TIBC 234, iron saturation 20%  4/6/2021-B12 461  4/27/2021-hemoglobin 11, WBC 5.9, platelets 833,882  0/02/8963-IVVFDAKM Niferex 100 mg p.o. daily  4/22/2022- hemoglobin 0.8, creatinine 1.4, GFR 39, ferritin 255, TIBC 225, iron saturation 18%. 4/26/2022- resume iron sulfate 325 mg p.o. daily  September 2022-she was hospitalized at [facility] with hypercalcemia. PTH low, PTH related protein was high. She received Zometa. Normalization of her calcium levels. 09/09/22- PTH 13 (L)  10/19/22 Iron Studies ([facility]):Iron 45, TIBC 279, Iron Sat 16, Ferritin 201  10/26/2022 CT Chest W Contrast No acute process in CT scan of the chest with contrast enhancement. Small to moderate hiatal hernia. Cholelithiasis. This CT exam was performed using one or more of the following dose reduction techniques: automated exposure control, adjustment of the mA and/or kV according to patient size, and/or use of iterative reconstruction technique. 10/26/2022 CT Abd/Pelvis W IV Contrast (oral) No acute pathology seen in abdomen or pelvis. Cholelithiasis. Small hiatal hernia. PAST MEDICAL HISTORY:   Past Medical History:   Diagnosis Date    Carotid stenosis     Colonic polyp     DM (diabetes mellitus) ([facility] [address]     Essential hypertension     Gastric ulcer     duodenal    GERD (gastroesophageal reflux disease)     Hiatal hernia     Hyperlipidemia     Iron deficiency anemia secondary to blood loss (chronic)     OA (osteoarthritis)     Status post dilatation of esophageal stricture           PAST SURGICAL HISTORY:  Past Surgical History:   Procedure Laterality Date    ANKLE SURGERY Left     ORIF    COLONOSCOPY  01/2018    Dr Arthuro Goodell Right 7/21/2022    RIGHT KNEE EXPLANT & PLACEMENT OF ANTIBIOTIC SPACER performed by Pepe Cobb MD at [address]  06/11/2018    Dr [facility]        SOCIAL HISTORY:  Social History     Socioeconomic History    Marital status:    Tobacco Use    Smoking status: Never    Smokeless tobacco: Never   Substance and Sexual Activity    Alcohol use: Never       FAMILY HISTORY:  Family History   Problem Relation Age of Onset    Colon Cancer Mother     Liver Cancer Mother         mets    Dementia Father         Current Outpatient Medications   Medication Sig Dispense Refill    alendronate (FOSAMAX) 70 MG tablet Take 1 tablet by mouth every 7 days Take with a full glass of water upon arising for the day. Consume on an empty stomach at least 30 minutes before the first food, beverage, or medication. Stay upright (do not lie down) for at least 30 minutes. Do not crush or break.  4 tablet 3    doxycycline hyclate (VIBRAMYCIN) 100 MG capsule Take 100 mg by mouth 2 times daily      HYDROcodone-acetaminophen (NORCO) 5-325 MG per tablet Take 1 tablet by mouth every 6 hours as needed for Pain. acetaminophen (TYLENOL) 325 MG tablet Take 650 mg by mouth every 8 hours as needed for Pain      vitamin B-12 (CYANOCOBALAMIN) 500 MCG tablet Take 500 mcg by mouth every 48 hours      aspirin EC 81 MG EC tablet Take 1 tablet by mouth in the morning and 1 tablet before bedtime. 60 tablet 0    estradiol (ESTRACE) 0.1 MG/GM vaginal cream Place 2 g vaginally daily Indications: Vaginitis      Probiotic Product (PROBIOTIC DAILY PO) Take 1 tablet by mouth daily Indications: Treatment for the Prevention of Constipation      omeprazole (PRILOSEC) 20 MG delayed release capsule Take 20 mg by mouth in the morning. Indications: Gastroesophageal Reflux Disease. atorvastatin (LIPITOR) 10 MG tablet Take 10 mg by mouth every evening Indications: High Amount of Fats in the Blood      cyclobenzaprine (FLEXERIL) 5 MG tablet Take 5 mg by mouth in the morning and 5 mg before bedtime. Indications: Restless Leg Syndrome. iron polysaccharides (NIFEREX) 150 MG capsule Take 150 mg by mouth in the morning. Indications: Anemia.      indapamide (LOZOL) 1.25 MG tablet Take 1.25 mg by mouth in the morning. Indications: Treatment with Diuretic Therapy. losartan (COZAAR) 100 MG tablet Take 100 mg by mouth daily      metFORMIN (GLUCOPHAGE) 500 MG tablet Take 500 mg by mouth every morning (before breakfast) Indications: Diabetes      rOPINIRole (REQUIP) 1 MG tablet Take 1 mg by mouth nightly      tolterodine (DETROL LA) 4 MG extended release capsule Take 4 mg by mouth nightly Indications: Bladder Spasm      levocetirizine (XYZAL) 5 MG tablet Take 5 mg by mouth daily as needed Indications:  Allergic Rhinitis      Coenzyme Q10 10 MG CAPS Take 200 mg by mouth daily Indications: Treatment for the Prevention of Constipation       No current facility-administered medications for this visit. REVIEW OF SYSTEMS:   CONSTITUTIONAL: no fever, no night sweats, no fatigue;  HEENT: no blurring of vision, no double vision, no hearing difficulty, no tinnitus, no ulceration, no dysplasia, no epistaxis;   LUNGS: no cough, no hemoptysis, no wheeze,  no shortness of breath;  CARDIOVASCULAR: no palpitation, no chest pain, no shortness of breath;  GI: no abdominal pain, no nausea, no vomiting, no diarrhea, no constipation;  ELROY: no dysuria, no hematuria, no frequency or urgency, no nephrolithiasis;  MUSCULOSKELETAL: no joint pain, no swelling, no stiffness;  ENDOCRINE: no polyuria, no polydipsia, no cold or heat intolerance;  HEMATOLOGY: no easy bruising or bleeding, no history of clotting disorder;  DERMATOLOGY: no skin rash, no eczema, no pruritus;  PSYCHIATRY: no depression, no anxiety, no panic attacks, no suicidal ideation, no homicidal ideation;  NEUROLOGY: no syncope, no seizures, no numbness or tingling of hands, no numbness or tingling of feet, no paresis;     Vitals signs:  /72   Pulse 82   Ht 5' 6\" (1.676 m)   Wt 183 lb (83 kg)   SpO2 100%   BMI 29.54 kg/m²    Pain scale:  Pain Score:   0 - No pain   PHYSICAL EXAM:  CONSTITUTIONAL: Alert, appropriate, no acute distress,   EYES: Non icteric, EOM intact, pupils equal round and reactive to light and accommodation. ENT: Oral mucus membranes moist, no oral pharyngeal lesions. External inspection of ears and nose are normal.   NECK: Supple, no masses. No palpable thyroid mass    CHEST/LUNGS: CTA bilaterally, normal respiratory effort   CARDIOVASCULAR: tchycardic, no murmurs. No lower extremity edema   ABDOMEN: soft non-tender, active bowel sounds, no hepatosplenomegaly. No palpable masses. EXTREMITIES: warm, Full ROM of all fours extremities. No focal weakness.   SKIN: warm, dry with no rashes or lesions  LYMPH: No cervical, clavicular, axillary, or inguinal lymphadenopathy  NEUROLOGIC: follows commands, non focal.   PSYCH: mood and affect appropriate. Alert and oriented to time and place and person. Relevant Lab findings/reviewed by me:  10/19/22 Iron Studies ([facility]):Iron 45, TIBC 279, Iron Sat 16, Ferritin 201  10/31/22 CBC ([address] [place] [address])  WBC 6.6  HGB 10.6    Neut 1.4    10/26/2022 CMP    Relevant Imaging studies/reviewed by me:  10/26/2022 CT Chest W Contrast No acute process in CT scan of the chest with contrast enhancement. Small to moderate hiatal hernia. Cholelithiasis. This CT exam was performed using one or more of the following dose reduction techniques: automated exposure control, adjustment of the mA and/or kV according to patient size, and/or use of iterative reconstruction technique. 10/26/2022 CT Abd/Pelvis W IV Contrast (oral) No acute pathology seen in abdomen or pelvis. Cholelithiasis. Small hiatal hernia. ASSESSMENT    Orders Placed This Encounter   Procedures    PTH, Intact     Standing Status:   Future     Standing Expiration Date:   11/8/2023    Comprehensive Metabolic Panel     Standing Status:   Future     Standing Expiration Date:   11/8/2023          Tj Deal was seen today for follow-up. Diagnoses and all orders for this visit:    Iron deficiency anemia secondary to blood loss (chronic)  -     Cancel: CBC with Auto Differential; Future    Anemia in stage 3 chronic kidney disease, unspecified whether stage 3a or 3b CKD ([facility] [address]    Care plan discussed with patient    Hypercalcemia  -     Cancel: Comprehensive Metabolic Panel; Standing  -     PTH, Intact; Future  -     Comprehensive Metabolic Panel; Future           History of iron deficiency anemia secondary to upper GI ulcers (healed):    Anemia is multifactorial to include CKD stage IIIa, anemia of iron deficiency, anemia of chronic disease  CBC today reveals a hemoglobin 10.3  April 2022-ferritin 201, iron saturation 45%, TIBC 279  Creatinine 1.24, GFR 55  10/19/22 Iron Studies ([facility]):Iron 45, TIBC 279, Iron Sat 16, Ferritin 201  10/31/22 CBC ([facility] [address])  WBC 6.6  HGB 10.6    Neut 1.4     B12 deficiency-continue oral B12 replacement. DM type II-last hemoglobin A1c 7    Hypertension-systolic blood pressure slightly higher  -Educated about keeping her blood pressure under control  /72   Pulse 82   Ht 5' 6\" (1.676 m)   Wt 183 lb (83 kg)   SpO2 100%   BMI 29.54 kg/m²     Hypercalcemia-concern for hypercalcemia of malignancy  - Hospitalized calcium 14, [facility]  -Zometa 4 mg IV was given in the hospital  -Calcium improved, normalized 8.9 on 10/21/2022  -SPEP and free light chains. Normal kappa/lambda ratio  -No calcium suppl. No Vit D intake  -Normal vitamin D levels  -PTH reportedly low, 13(L)  -PTH-r protein elevated 4.9 on 09/21  -10/26/2022 CT Chest W Contrast No acute process in CT scan of the chest with contrast enhancement. Small to moderate hiatal hernia. Cholelithiasis. This CT exam was performed using one or more of the following dose reduction techniques: automated exposure control, adjustment of the mA and/or kV according to patient size, and/or use of iterative reconstruction technique. 10/26/2022 CT Abd/Pelvis W IV Contrast (oral) No acute pathology seen in abdomen or pelvis. Cholelithiasis. Small hiatal hernia. 11/8/2022-CMP showed calcium 9.4, creatinine 1.21. Total protein 6.8. Normal LFTs. 10/26/7317-MHQ-kgzuwud today    At this point, no clear current evidence of malignancy. She denies any new GI symptomatology. She had a colonoscopy last year. She denies any dysphagia. CT chest abdomen pelvis showed no evidence of lung or pancreatic malignancy. No evidence of lymphadenopathy. She has no complaints of bone pain. CT chest abdomen pelvis did not report any evidence of bone metastasis. I favor to follow-up with her conservatively at this time. We will address and perform further work-up if she has any localized symptoms.   We will continue to monitor her calcium levels closely. Plan:  RTC with MD in [place] office 4 weeks  Repeat CMP and PTH today  Discontinue OTC Ferrous Sulfate daily 325 mg  Follow-up with nephrology    Follow Up:     Return in about 4 weeks (around 12/6/2022) for CBC CMP Appointment with Dr. Austen Banegas in [place] office. Data Adalid Reynolds am pre charting  as Medical Assistant for Lawrence Newell MD. Electronically signed by Rocio Mckeon MA on 11/8/2022 at 4:51 PM CST. Latia Wets am scribing for Lawrence Newell MD. Electronically signed by Francisca Mendes RN on 11/8/2022 at 12:00 PM CST. I, Dr Luis Coon, personally performed the services described in this documentation as scribed by Francisca Mendes RN in my presence and is both accurate and complete. I have seen, examined and reviewed this patient medication list, appropriate labs and imaging studies. I reviewed relevant medical records and others physicians notes. I discussed the plans of care with the patient. I answered all the questions to the patients satisfaction. I have also reviewed the chief complaint (CC) and part of the history (History of Present Illness (HPI), Past Family Social History [facility]), or Review of Systems (ROS) and made changes when appropriated.        (Please note that portions of this note were completed with a voice recognition program. Efforts were made to edit the dictations but occasionally words are mis-transcribed.)  Electronically signed by Lawrence Newell MD on 11/8/2022 at 12:20 PM

## 2022-11-08 ENCOUNTER — OFFICE VISIT (OUTPATIENT)
Dept: HEMATOLOGY | Age: 72
End: 2022-11-08
Payer: MEDICARE

## 2022-11-08 VITALS
OXYGEN SATURATION: 100 % | HEIGHT: 66 IN | BODY MASS INDEX: 29.41 KG/M2 | SYSTOLIC BLOOD PRESSURE: 138 MMHG | WEIGHT: 183 LBS | HEART RATE: 82 BPM | DIASTOLIC BLOOD PRESSURE: 72 MMHG

## 2022-11-08 DIAGNOSIS — N18.30 ANEMIA IN STAGE 3 CHRONIC KIDNEY DISEASE, UNSPECIFIED WHETHER STAGE 3A OR 3B CKD (HCC): ICD-10-CM

## 2022-11-08 DIAGNOSIS — D63.1 ANEMIA IN STAGE 3 CHRONIC KIDNEY DISEASE, UNSPECIFIED WHETHER STAGE 3A OR 3B CKD (HCC): ICD-10-CM

## 2022-11-08 DIAGNOSIS — D50.0 IRON DEFICIENCY ANEMIA SECONDARY TO BLOOD LOSS (CHRONIC): Primary | ICD-10-CM

## 2022-11-08 DIAGNOSIS — E83.52 HYPERCALCEMIA: ICD-10-CM

## 2022-11-08 DIAGNOSIS — Z71.89 CARE PLAN DISCUSSED WITH PATIENT: ICD-10-CM

## 2022-11-08 PROCEDURE — G8427 DOCREV CUR MEDS BY ELIG CLIN: HCPCS | Performed by: INTERNAL MEDICINE

## 2022-11-08 PROCEDURE — 3078F DIAST BP <80 MM HG: CPT | Performed by: INTERNAL MEDICINE

## 2022-11-08 PROCEDURE — 1036F TOBACCO NON-USER: CPT | Performed by: INTERNAL MEDICINE

## 2022-11-08 PROCEDURE — 1123F ACP DISCUSS/DSCN MKR DOCD: CPT | Performed by: INTERNAL MEDICINE

## 2022-11-08 PROCEDURE — 1090F PRES/ABSN URINE INCON ASSESS: CPT | Performed by: INTERNAL MEDICINE

## 2022-11-08 PROCEDURE — 3074F SYST BP LT 130 MM HG: CPT | Performed by: INTERNAL MEDICINE

## 2022-11-08 PROCEDURE — G8417 CALC BMI ABV UP PARAM F/U: HCPCS | Performed by: INTERNAL MEDICINE

## 2022-11-08 PROCEDURE — 99214 OFFICE O/P EST MOD 30 MIN: CPT | Performed by: INTERNAL MEDICINE

## 2022-11-08 PROCEDURE — G8484 FLU IMMUNIZE NO ADMIN: HCPCS | Performed by: INTERNAL MEDICINE

## 2022-11-08 PROCEDURE — 3017F COLORECTAL CA SCREEN DOC REV: CPT | Performed by: INTERNAL MEDICINE

## 2022-11-08 PROCEDURE — G8400 PT W/DXA NO RESULTS DOC: HCPCS | Performed by: INTERNAL MEDICINE

## 2022-11-21 ENCOUNTER — TELEPHONE (OUTPATIENT)
Dept: INFUSION THERAPY | Age: 72
End: 2022-11-21

## 2022-11-21 NOTE — TELEPHONE ENCOUNTER
Patient called related to lab work prior to appt on Dec. 6th. Called and left patient a voice mail informing her that she will receive lab work the day of her appt.  Electronically signed by Ignacio Anguiano RN on 11/21/2022 at 2:50 PM

## 2022-11-23 ENCOUNTER — TELEPHONE (OUTPATIENT)
Dept: VASCULAR SURGERY | Facility: CLINIC | Age: 72
End: 2022-11-23

## 2022-11-28 ENCOUNTER — HOSPITAL ENCOUNTER (OUTPATIENT)
Dept: ULTRASOUND IMAGING | Facility: HOSPITAL | Age: 72
Discharge: HOME | End: 2022-11-28
Admitting: NURSE PRACTITIONER

## 2022-11-28 ENCOUNTER — OFFICE VISIT (OUTPATIENT)
Dept: VASCULAR SURGERY | Facility: CLINIC | Age: 72
End: 2022-11-28

## 2022-11-28 VITALS
DIASTOLIC BLOOD PRESSURE: 68 MMHG | OXYGEN SATURATION: 99 % | HEART RATE: 91 BPM | HEIGHT: 66 IN | WEIGHT: 182 LBS | SYSTOLIC BLOOD PRESSURE: 140 MMHG | BODY MASS INDEX: 29.25 KG/M2

## 2022-11-28 DIAGNOSIS — E78.5 HYPERLIPIDEMIA, UNSPECIFIED HYPERLIPIDEMIA TYPE: ICD-10-CM

## 2022-11-28 DIAGNOSIS — I10 ESSENTIAL HYPERTENSION: ICD-10-CM

## 2022-11-28 DIAGNOSIS — I65.23 BILATERAL CAROTID ARTERY STENOSIS: Primary | ICD-10-CM

## 2022-11-28 DIAGNOSIS — I65.23 BILATERAL CAROTID ARTERY STENOSIS: ICD-10-CM

## 2022-11-28 PROCEDURE — 93880 EXTRACRANIAL BILAT STUDY: CPT | Performed by: SURGERY

## 2022-11-28 PROCEDURE — 93880 EXTRACRANIAL BILAT STUDY: CPT

## 2022-11-28 PROCEDURE — 99214 OFFICE O/P EST MOD 30 MIN: CPT | Performed by: NURSE PRACTITIONER

## 2022-11-28 RX ORDER — ESTRADIOL 0.1 MG/G
CREAM VAGINAL
COMMUNITY

## 2022-11-28 RX ORDER — ALENDRONATE SODIUM 70 MG/1
TABLET ORAL
COMMUNITY
Start: 2022-11-12

## 2022-11-28 NOTE — PROGRESS NOTES
"11/28/2022       Crow Landa MD   [address] [facility] [address]    Radha Rodriguez  1950    Chief Complaint   Patient presents with   • Follow-up     1 yr f/u with carotid testing.  Last seen in the office in 12/15/21.  Pt denies any stroke type symptoms.        Dear Crow Landa MD       HPI  I had the pleasure of seeing your patient Radha Rodriguez in the office today.   As you recall, Radha Rodriguez is a 72 y.o.  female who we are following for asymptomatic carotid occlusive disease.  Currently she is doing well and denies any strokelike symptoms.  She also denies any claudication to her lower extremities.  She is maintained on aspirin and Lipitor.  She is working with her providers regarding her elevated calcium.  She reports most recent lab values were 9.1.  she did have noninvasive testing performed today, which I did review in office.       Review of Systems   Constitutional: Negative.    HENT: Negative.    Eyes: Negative.    Respiratory: Negative.    Cardiovascular: Positive for leg swelling.   Gastrointestinal: Negative.    Endocrine: Negative.    Genitourinary: Negative.    Musculoskeletal: Positive for arthralgias and gait problem.   Skin: Negative.    Allergic/Immunologic: Negative.    Hematological: Negative.    Psychiatric/Behavioral: Negative.    All other systems reviewed and are negative.       /68   Pulse 91   Ht 167.6 cm (66\")   Wt 82.6 kg (182 lb)   SpO2 99%   BMI 29.38 kg/m²   Physical Exam  Vitals and nursing note reviewed.   Constitutional:       General: She is not in acute distress.     Appearance: Normal appearance. She is well-developed and overweight. She is not diaphoretic.   HENT:      Head: Normocephalic and atraumatic.   Eyes:      General: No scleral icterus.     Pupils: Pupils are equal, round, and reactive to light.   Neck:      Thyroid: No thyromegaly.      Vascular: No carotid bruit or JVD.   Cardiovascular:      Rate and Rhythm: Normal rate " and regular rhythm.      Pulses: Normal pulses.      Heart sounds: Normal heart sounds and S2 normal. No murmur heard.    No friction rub. No gallop.      Comments: Varicose veins to bilateral lower extremities.  Pulmonary:      Effort: Pulmonary effort is normal.      Breath sounds: Normal breath sounds.   Abdominal:      General: Bowel sounds are normal.      Palpations: Abdomen is soft.   Musculoskeletal:         General: Swelling present. Normal range of motion.      Cervical back: Normal range of motion and neck supple.   Skin:     General: Skin is warm and dry.   Neurological:      General: No focal deficit present.      Mental Status: She is alert and oriented to person, place, and time.      Cranial Nerves: No cranial nerve deficit.   Psychiatric:         Mood and Affect: Mood normal.         Behavior: Behavior normal. Behavior is cooperative.         Thought Content: Thought content normal.         Judgment: Judgment normal.           Diagnostic Data:  US Carotid Bilateral    Result Date: 11/28/2022  Narrative: History: Carotid occlusive disease      Impression: Impression: 1. There is less than 50% stenosis of the right internal carotid artery. 2. There is less than 50% stenosis of the left internal carotid artery. 3. Antegrade flow is demonstrated in bilateral vertebral arteries.  Comments: Bilateral carotid vertebral arterial duplex scan was performed.  Grayscale imaging shows intimal thickening and calcified elements at the carotid bifurcation. The right internal carotid artery peak systolic velocity is 119.6 cm/sec. The end-diastolic velocity is 31.3 cm/sec. The right ICA/CCA ratio is approximately 0.8 . These findings correlate with less than 50% stenosis of the right internal carotid artery.  Grayscale imaging shows intimal thickening and calcified elements at the carotid bifurcation. The left internal carotid artery peak systolic velocity is 95.1 cm/sec. The end-diastolic velocity is 22.7 cm/sec.  The left ICA/CCA ratio is approximately 0.6 . These findings correlate with less than 50% stenosis of the left internal carotid artery.  Antegrade flow is demonstrated in bilateral vertebral arteries. There is greater than 50% stenosis in bilateral common carotid arteries in the right external carotid artery. This report was finalized on 11/28/2022 14:19 by Dr. Mario Saldana MD.         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 Bilateral carotid artery stenosis  I65.23 433.10     433.30   2. Essential hypertension  I10 401.9   3. Hyperlipidemia, unspecified hyperlipidemia type  E78.5 272.4       Plan: After thoroughly evaluating Radha Rodriguez, I believe the best course of action is to remain conservative from vascular surgery standpoint.  Overall she is doing well and denies any strokelike symptoms.  I did review her testing which shows less than 50% carotid stenosis bilaterally.  We will see her back in 1 year with repeat noninvasive testing for continued surveillance, including a carotid duplex.  She will be having her knee reevaluated and possibly removing antibiotic spacers in January.  She also has some arthritis to her left knee as well.  She is following with hematology as well as nephrology.  Recent calcium levels extremely elevated however per her report most recent 9.1.  I did discuss vascular risk factors as they pertain to the progression of vascular disease including controlling her hypertension, diabetes, and hyperlipidemia.  Her blood pressure stable on her current medications.  She is maintained on Lipitor for hyperlipidemia.  Her last hemoglobin A1c was well controlled at 5.7%.  She can continue taking her medications as previously discussed.  This was all discussed in full with complete understanding.      Thank you for allowing me to participate in the care of your patient.  Please do not hesitate with any questions or concerns.  I will keep you aware of any further encounters with Radha Rodriguez.        Sincerely yours,         GUNNAR Damon, Crow Hargrove MD

## 2022-12-05 NOTE — PROGRESS NOTES
Isaac Peru   1950 12/6/2022     Chief Complaint   Patient presents with    Follow-up     Iron deficiency anemia secondary to blood loss (chronic)          INTERVAL HISTORY/HISTORY OF PRESENT ILLNESS:  The patient is a pleasant 71years old female who has been diagnosed with iron deficiency anemia. In addition, she has chronic kidney disease stage IIIa. She has a history hypertension/diabetes. He was hospitalized 3 times here at [facility] and also [facility] with hypercalcemia. PTH was slightly low and PTH related protein was high. SPEP was unremarkable. Vitamin B12 was normal.  The patient had a colonoscopy last year that was unremarkable. She denies any breast complaints. The breast exam was normal and performed by me. She denies any other symptoms. Specifically, no weight loss. No blood in her urine or stools. She had Zometa 1 time in the hospital with normalization of her calcium levels. Last calcium level was 9.4 on 11/8/2022. Diagnosis   Iron deficiency secondary to upper GI ulcers   Normal colonoscopy January 2018. Chronic kidney disease stage IIIb  Hypercalcemia  Treatment summary  PO Niferex every other day   Sep 2018-Injectafer 1500mg      Interval history  Mrs. Starr Fields is a 71year-old  female with a history of iron deficiency anemia secondary to GI blood loss from gastric ulcers. . She continues to take PO iron replacement, Niferex, once daily and tolerating without significant difficulty. She was recently seen by her primary care provider. Her hemoglobin was 10.6. She was taking Niferex every other day and was instructed to take it daily. She has no new complaints today. Denies any  melena, hematochezia, change in the color of her stools. She denies any epigastric pain. She denies any use of NSAIDs. She denies any hematuria. CBC today showed improvement of hemoglobin to 11.0.   HEMATOLOGY HISTORY: Iron deficiency anemia 2/2 upper GI ulcers  Yaya Long was seen in initial hematology consultation on 7/72/8462 referred by Louis GONZALEZ for persistent iron deficiency anemia. Parvin Rice reports that she was started on Niferex 150 mg daily on 11/13/2017. A CBC that is available from 11/28/2017 revealed a WBC of 6, Hgb 10.8, MCV 86, PLT of 254,000. Serum iron was 57. Most recent follow-up endoscopy was performed on 6/11/2018 which revealed 2 superficial gastric ulcers within the antrum of the stomach and healing of the former duodenal ulcers. Mild to moderate gastritis of the antrum of the stomach was noted. She did have a distal esophageal stricture that was dilated. 1/29/2018-normal colonoscopy. Upper EGD showed 7 gastric ulcers and 4 duodenal ulcers. CBC 2/13/2018 revealed a WBC of 6.7, Hgb 10.4 with MCV 85.3 and PLT of 222,000.   07/20/2018-WBC 8.2, hemoglobin 11.5, PLT of 213,000. Ferritin 52, iron saturation 14%, TIBC 258.   9/5/2018-recommended 2 doses of IV Injectafer 750 mg. September 2018- s/p 1500mg Injectafer IV.   10/23/2018- CBC Hb 11.8/MCV90. Normal WBC/Plts. 1/22/2019-CBC with hemoglobin 11.6.   7/23/2019- CBC with a hemoglobin of 11.9 and MCV 92.1, continues to take Niferex daily  01/21/2020- CBC Hb 11.4/91. WBC 5.2, Platelets 101C. Iron 46, TIBC 234, iron saturation 20%  4/6/2021-B12 461  4/27/2021-hemoglobin 11, WBC 5.9, platelets 750,332  5/78/3185-[facility] Niferex 100 mg p.o. daily  4/22/2022- hemoglobin 0.8, creatinine 1.4, GFR 39, ferritin 255, TIBC 225, iron saturation 18%. 4/26/2022- resume iron sulfate 325 mg p.o. daily  September 2022-she was hospitalized at [facility] with hypercalcemia. PTH low, PTH related protein was high. She received Zometa. Normalization of her calcium levels. 09/09/22- PTH 13 (L)  10/19/22 Iron Studies ([facility]):Iron 45, TIBC 279, Iron Sat 16, Ferritin 201  10/26/2022 CT Chest W Contrast No acute process in CT scan of the chest with contrast enhancement. Small to moderate hiatal hernia. Cholelithiasis. This CT exam was performed using one or more of the following dose reduction techniques: automated exposure control, adjustment of the mA and/or kV according to patient size, and/or use of iterative reconstruction technique. 10/26/2022 CT Abd/Pelvis W IV Contrast (oral) No acute pathology seen in abdomen or pelvis. Cholelithiasis. Small hiatal hernia. 11/8/22 PTH ([address]) 49, normal  11/23/22-Calcium 9.1        PAST MEDICAL HISTORY:   Past Medical History:   Diagnosis Date    Carotid stenosis     Colonic polyp     DM (diabetes mellitus) ([facility] [address]     Essential hypertension     Gastric ulcer     duodenal    GERD (gastroesophageal reflux disease)     Hiatal hernia     Hyperlipidemia     Iron deficiency anemia secondary to blood loss (chronic)     OA (osteoarthritis)     Status post dilatation of esophageal stricture           PAST SURGICAL HISTORY:  Past Surgical History:   Procedure Laterality Date    ANKLE SURGERY Left     ORIF    COLONOSCOPY  01/2018    Dr Rosamaria Velásquez Right 7/21/2022    RIGHT KNEE EXPLANT & PLACEMENT OF ANTIBIOTIC SPACER performed by Ragini Purdy MD at [address] ENDOSCOPY  06/11/2018    Dr [facility]aley[facility]        SOCIAL HISTORY:  Social History     Socioeconomic History    Marital status:      Spouse name: None    Number of children: None    Years of education: None    Highest education level: None   Tobacco Use    Smoking status: Never    Smokeless tobacco: Never   Substance and Sexual Activity    Alcohol use: Never       FAMILY HISTORY:  Family History   Problem Relation Age of Onset    Colon Cancer Mother     Liver Cancer Mother         mets    Dementia Father         Current Outpatient Medications   Medication Sig Dispense Refill    alendronate (FOSAMAX) 70 MG tablet Take 1 tablet by mouth every 7 days Take with a full glass of water upon arising for the day.  Consume on an empty stomach at least 30 minutes before the first food, beverage, or medication. Stay upright (do not lie down) for at least 30 minutes. Do not crush or break. 4 tablet 3    doxycycline hyclate (VIBRAMYCIN) 100 MG capsule Take 100 mg by mouth 2 times daily      HYDROcodone-acetaminophen (NORCO) 5-325 MG per tablet Take 1 tablet by mouth every 6 hours as needed for Pain. acetaminophen (TYLENOL) 325 MG tablet Take 650 mg by mouth every 8 hours as needed for Pain      vitamin B-12 (CYANOCOBALAMIN) 500 MCG tablet Take 500 mcg by mouth every 48 hours      aspirin EC 81 MG EC tablet Take 1 tablet by mouth in the morning and 1 tablet before bedtime. 60 tablet 0    estradiol (ESTRACE) 0.1 MG/GM vaginal cream Place 2 g vaginally daily Indications: Vaginitis      Probiotic Product (PROBIOTIC DAILY PO) Take 1 tablet by mouth daily Indications: Treatment for the Prevention of Constipation      omeprazole (PRILOSEC) 20 MG delayed release capsule Take 20 mg by mouth in the morning. Indications: Gastroesophageal Reflux Disease. atorvastatin (LIPITOR) 10 MG tablet Take 10 mg by mouth every evening Indications: High Amount of Fats in the Blood      cyclobenzaprine (FLEXERIL) 5 MG tablet Take 5 mg by mouth in the morning and 5 mg before bedtime. Indications: Restless Leg Syndrome. iron polysaccharides (NIFEREX) 150 MG capsule Take 150 mg by mouth in the morning. Indications: Anemia.      indapamide (LOZOL) 1.25 MG tablet Take 1.25 mg by mouth in the morning. Indications: Treatment with Diuretic Therapy. losartan (COZAAR) 100 MG tablet Take 100 mg by mouth daily      metFORMIN (GLUCOPHAGE) 500 MG tablet Take 500 mg by mouth every morning (before breakfast) Indications: Diabetes      rOPINIRole (REQUIP) 1 MG tablet Take 1 mg by mouth nightly      tolterodine (DETROL LA) 4 MG extended release capsule Take 4 mg by mouth nightly Indications: Bladder Spasm      levocetirizine (XYZAL) 5 MG tablet Take 5 mg by mouth daily as needed Indications:  Allergic Rhinitis Coenzyme Q10 10 MG CAPS Take 200 mg by mouth daily Indications: Treatment for the Prevention of Constipation       No current facility-administered medications for this visit. REVIEW OF SYSTEMS:   CONSTITUTIONAL: no fever, no night sweats,  fatigue;  HEENT: no blurring of vision, no double vision, no hearing difficulty, no tinnitus, no ulceration, no dysplasia, no epistaxis;   LUNGS: no cough, no hemoptysis, no wheeze,  no shortness of breath;  CARDIOVASCULAR: no palpitation, no chest pain, no shortness of breath;  GI: no abdominal pain, no nausea, no vomiting, no diarrhea, no constipation;  ELROY: no dysuria, no hematuria, no frequency or urgency, no nephrolithiasis;  MUSCULOSKELETAL: no joint pain, no swelling, no stiffness;  ENDOCRINE: no polyuria, no polydipsia, no cold or heat intolerance;  HEMATOLOGY: no easy bruising or bleeding, no history of clotting disorder;  DERMATOLOGY: no skin rash, no eczema, no pruritus;  PSYCHIATRY: no depression, no anxiety, no panic attacks, no suicidal ideation, no homicidal ideation;  NEUROLOGY: no syncope, no seizures, no numbness or tingling of hands, no numbness or tingling of feet, no paresis;     Vitals signs:  /88   Pulse 95   Ht 5' 6\" (1.676 m)   Wt 183 lb (83 kg)   SpO2 95%   BMI 29.54 kg/m²    Pain scale:  Pain Score:   0 - No pain   PHYSICAL EXAM:  CONSTITUTIONAL: Alert, appropriate, no acute distress,   EYES: Non icteric, EOM intact, pupils equal round and reactive to light and accommodation. ENT: Oral mucus membranes moist, no oral pharyngeal lesions. External inspection of ears and nose are normal.   NECK: Supple, no masses. No palpable thyroid mass    CHEST/LUNGS: CTA bilaterally, normal respiratory effort   CARDIOVASCULAR: tchycardic, no murmurs. No lower extremity edema   ABDOMEN: soft non-tender, active bowel sounds, no hepatosplenomegaly. No palpable masses. EXTREMITIES: warm, Full ROM of all fours extremities. No focal weakness.   SKIN: warm, dry with no rashes or lesions  LYMPH: No cervical, clavicular, axillary, or inguinal lymphadenopathy  NEUROLOGIC: follows commands, non focal.   PSYCH: mood and affect appropriate. Alert and oriented to time and place and person. Relevant Lab findings/reviewed by me:  11/8/22 PTH [facility]) 49    12/6/22 CBC ([address])  WBC 4.1  HGB 10.4    Neut 2.2    Relevant Imaging studies/reviewed by me:  None    ASSESSMENT    Orders Placed This Encounter   Procedures    CBC with Auto Differential     Standing Status:   Future     Standing Expiration Date:   12/6/2023            Lisa was seen today for follow-up. Diagnoses and all orders for this visit:    Anemia in stage 3 chronic kidney disease, unspecified whether stage 3a or 3b CKD ([facility] [address]  -     CBC with Auto Differential; Future    Care plan discussed with patient             History of iron deficiency anemia secondary to upper GI ulcers (healed): Anemia is multifactorial to include CKD stage IIIa, anemia of iron deficiency, anemia of chronic disease  CBC today reveals a hemoglobin 10.3  April 2022-ferritin 201, iron saturation 45%, TIBC 279  Creatinine 1.24, GFR 55  10/19/22 Iron Studies ([facility]):Iron 45, TIBC 279, Iron Sat 16, Ferritin 201  10/31/22 CBC ([address])  WBC 6.6  HGB 10.6    Neut 1.4    B12 deficiency-continue oral B12 replacement. DM type II-last hemoglobin A1c 7    Hypertension-systolic blood pressure slightly higher  -Educated about keeping her blood pressure under control  /88   Pulse 95   Ht 5' 6\" (1.676 m)   Wt 183 lb (83 kg)   SpO2 95%   BMI 29.54 kg/m²     Hypercalcemia-concern for hypercalcemia of malignancy  - Hospitalized calcium 14, [facility]  -Zometa 4 mg IV was given in the hospital  -Calcium improved, normalized 8.9 on 10/21/2022  -SPEP and free light chains. Normal kappa/lambda ratio  -No calcium suppl.  No Vit D intake  -Normal vitamin D levels  -PTH reportedly low, 13(L)  -PTH-r protein elevated 4.9 on 09/21  -10/26/2022 CT Chest W Contrast No acute process in CT scan of the chest with contrast enhancement. Small to moderate hiatal hernia. Cholelithiasis. This CT exam was performed using one or more of the following dose reduction techniques: automated exposure control, adjustment of the mA and/or kV according to patient size, and/or use of iterative reconstruction technique. 10/26/2022 CT Abd/Pelvis W IV Contrast (oral) No acute pathology seen in abdomen or pelvis. Cholelithiasis. Small hiatal hernia. 11/8/2022-CMP showed calcium 9.4, creatinine 1.21. Total protein 6.8. Normal LFTs. 10/26/2022-PTH-49 (normal)    At this point, no clear current evidence of malignancy. She denies any new GI symptomatology. She had a colonoscopy 2021  She denies any dysphagia. CT chest abdomen pelvis showed no evidence of lung or pancreatic malignancy. No evidence of lymphadenopathy. She has no complaints of bone pain. CT chest abdomen pelvis did not report any evidence of bone metastasis. I favor to follow-up with her conservatively at this time. We will address and perform further work-up if she has any localized symptoms. We will continue to monitor her calcium levels closely. Plan:  RTC with MD in [facility] January 2023  Repeat CMP  Discontinue OTC Ferrous Sulfate daily 325 mg due to poor oral tolerance  Follow-up with nephrology    Follow Up:     Return in about 8 weeks (around 1/31/2023) for CBC, Appointment with Dr. Tyrone Babin in [facility]. Data Olman Charlton am pre charting  as Medical Assistant for Ruy Ibarra MD. Electronically signed by Kisha Mayo MA on 12/6/2022 at 4:23 PM CST. Leonel Lundberg am scribing for Ruy Ibarra MD. Electronically signed by Kike Osman, RN on 12/6/2022 at 12:53 PM CST.     I, Dr Robyn Young, personally performed the services described in this documentation as scribed by Kike Osman, RN in my presence and is both accurate and complete. I have seen, examined and reviewed this patient medication list, appropriate labs and imaging studies. I reviewed relevant medical records and others physicians notes. I discussed the plans of care with the patient. I answered all the questions to the patients satisfaction. I have also reviewed the chief complaint (CC) and part of the history (History of Present Illness (HPI), Past Family Social History [facility]), or Review of Systems (ROS) and made changes when appropriated.        (Please note that portions of this note were completed with a voice recognition program. Efforts were made to edit the dictations but occasionally words are mis-transcribed.)  Electronically signed by Sara Negro MD on 12/6/2022 at 1:43 PM

## 2022-12-06 ENCOUNTER — OFFICE VISIT (OUTPATIENT)
Dept: HEMATOLOGY | Age: 72
End: 2022-12-06
Payer: MEDICARE

## 2022-12-06 VITALS
HEART RATE: 95 BPM | OXYGEN SATURATION: 95 % | DIASTOLIC BLOOD PRESSURE: 88 MMHG | BODY MASS INDEX: 29.41 KG/M2 | WEIGHT: 183 LBS | HEIGHT: 66 IN | SYSTOLIC BLOOD PRESSURE: 138 MMHG

## 2022-12-06 DIAGNOSIS — Z71.89 CARE PLAN DISCUSSED WITH PATIENT: ICD-10-CM

## 2022-12-06 DIAGNOSIS — N18.30 ANEMIA IN STAGE 3 CHRONIC KIDNEY DISEASE, UNSPECIFIED WHETHER STAGE 3A OR 3B CKD (HCC): Primary | ICD-10-CM

## 2022-12-06 DIAGNOSIS — D63.1 ANEMIA IN STAGE 3 CHRONIC KIDNEY DISEASE, UNSPECIFIED WHETHER STAGE 3A OR 3B CKD (HCC): Primary | ICD-10-CM

## 2022-12-06 PROCEDURE — 1090F PRES/ABSN URINE INCON ASSESS: CPT | Performed by: INTERNAL MEDICINE

## 2022-12-06 PROCEDURE — 99213 OFFICE O/P EST LOW 20 MIN: CPT | Performed by: INTERNAL MEDICINE

## 2022-12-06 PROCEDURE — 3078F DIAST BP <80 MM HG: CPT | Performed by: INTERNAL MEDICINE

## 2022-12-06 PROCEDURE — G8484 FLU IMMUNIZE NO ADMIN: HCPCS | Performed by: INTERNAL MEDICINE

## 2022-12-06 PROCEDURE — 1036F TOBACCO NON-USER: CPT | Performed by: INTERNAL MEDICINE

## 2022-12-06 PROCEDURE — 3017F COLORECTAL CA SCREEN DOC REV: CPT | Performed by: INTERNAL MEDICINE

## 2022-12-06 PROCEDURE — G8427 DOCREV CUR MEDS BY ELIG CLIN: HCPCS | Performed by: INTERNAL MEDICINE

## 2022-12-06 PROCEDURE — 1123F ACP DISCUSS/DSCN MKR DOCD: CPT | Performed by: INTERNAL MEDICINE

## 2022-12-06 PROCEDURE — 3074F SYST BP LT 130 MM HG: CPT | Performed by: INTERNAL MEDICINE

## 2022-12-06 PROCEDURE — G8400 PT W/DXA NO RESULTS DOC: HCPCS | Performed by: INTERNAL MEDICINE

## 2022-12-06 PROCEDURE — G8417 CALC BMI ABV UP PARAM F/U: HCPCS | Performed by: INTERNAL MEDICINE

## 2023-01-30 NOTE — PROGRESS NOTES
Susan Sumner   1950 1/31/2023     Chief Complaint   Patient presents with    Follow-up            INTERVAL HISTORY/HISTORY OF PRESENT ILLNESS:  The patient is a pleasant 68years old female who has been diagnosed with iron deficiency anemia. In addition, she has chronic kidney disease stage IIIa. She has a history hypertension/diabetes. He was hospitalized 3 times here at [facility] and also [facility] with hypercalcemia. PTH was slightly low and PTH related protein was high. SPEP was unremarkable. Vitamin B12 was normal.  The patient had a colonoscopy last year that was unremarkable. She denies any breast complaints. The breast exam was normal and performed by me. She denies any other symptoms. Specifically, no weight loss. No blood in her urine or stools. She had Zometa 1 time in the hospital with normalization of her calcium levels. Last calcium level was 9.4 on 11/8/2022. She denies any new complaints today. This is a telephone visit to follow-up on results of labs and further treatment recommendations. Diagnosis   Iron deficiency secondary to upper GI ulcers   Normal colonoscopy January 2018. Chronic kidney disease stage IIIb  Hypercalcemia  Treatment summary  PO Niferex every other day   Sep 2018-Injectafer 1500mg      Interval history  Mrs. Magaña is a 68year-old  female with a history of iron deficiency anemia secondary to GI blood loss from gastric ulcers. . She continues to take PO iron replacement, Niferex, once daily and tolerating without significant difficulty. She was recently seen by her primary care provider. Her hemoglobin was 10.6. She was taking Niferex every other day and was instructed to take it daily. She has no new complaints today. Denies any  melena, hematochezia, change in the color of her stools. She denies any epigastric pain. She denies any use of NSAIDs. She denies any hematuria.   CBC today showed improvement of hemoglobin to 11.0.  HEMATOLOGY HISTORY: Iron deficiency anemia 2/2 upper GI ulcers  Nathan De Santiago was seen in initial hematology consultation on 4/03/3117 referred by Marion GONZALEZ for persistent iron deficiency anemia. Nathan De Santiago reports that she was started on Niferex 150 mg daily on 11/13/2017. A CBC that is available from 11/28/2017 revealed a WBC of 6, Hgb 10.8, MCV 86, PLT of 254,000. Serum iron was 57. Most recent follow-up endoscopy was performed on 6/11/2018 which revealed 2 superficial gastric ulcers within the antrum of the stomach and healing of the former duodenal ulcers. Mild to moderate gastritis of the antrum of the stomach was noted. She did have a distal esophageal stricture that was dilated. 1/29/2018-normal colonoscopy. Upper EGD showed 7 gastric ulcers and 4 duodenal ulcers. CBC 2/13/2018 revealed a WBC of 6.7, Hgb 10.4 with MCV 85.3 and PLT of 222,000.   07/20/2018-WBC 8.2, hemoglobin 11.5, PLT of 213,000. Ferritin 52, iron saturation 14%, TIBC 258.   9/5/2018-recommended 2 doses of IV Injectafer 750 mg. September 2018- s/p 1500mg Injectafer IV.   10/23/2018- CBC Hb 11.8/MCV90. Normal WBC/Plts. 1/22/2019-CBC with hemoglobin 11.6.   7/23/2019- CBC with a hemoglobin of 11.9 and MCV 92.1, continues to take Niferex daily  01/21/2020- CBC Hb 11.4/91. WBC 5.2, Platelets 471U. Iron 46, TIBC 234, iron saturation 20%  4/6/2021-B12 461  4/27/2021-hemoglobin 11, WBC 5.9, platelets 884,901  2/83/7000-PWEARNQR Niferex 100 mg p.o. daily  4/22/2022- hemoglobin 0.8, creatinine 1.4, GFR 39, ferritin 255, TIBC 225, iron saturation 18%. 4/26/2022- resume iron sulfate 325 mg p.o. daily  September 2022-she was hospitalized at [facility] with hypercalcemia. PTH low, PTH related protein was high. She received Zometa. Normalization of her calcium levels.   09/09/22- PTH 13 (L)  10/19/22 Iron Studies ([facility]):Iron 45, TIBC 279, Iron Sat 16, Ferritin 201  10/26/2022 CT Chest W Contrast No acute process in CT scan of the chest with contrast enhancement. Small to moderate hiatal hernia. Cholelithiasis. This CT exam was performed using one or more of the following dose reduction techniques: automated exposure control, adjustment of the mA and/or kV according to patient size, and/or use of iterative reconstruction technique. 10/26/2022 CT Abd/Pelvis W IV Contrast (oral) No acute pathology seen in abdomen or pelvis. Cholelithiasis. Small hiatal hernia.    11/8/22 PTH ([address] [place] [address]) 49, normal  11/23/22-Calcium 9.1  12/28/22 Bilateral  Mammogram ([facility])No suspicious abnormalities in Right or left breast.  1/19/23 Calcium ([address]) 10.7        PAST MEDICAL HISTORY:   Past Medical History:   Diagnosis Date    Carotid stenosis     Colonic polyp     DM (diabetes mellitus) ([facility] [address]     Essential hypertension     Gastric ulcer     duodenal    GERD (gastroesophageal reflux disease)     Hiatal hernia     Hyperlipidemia     Iron deficiency anemia secondary to blood loss (chronic)     OA (osteoarthritis)     Status post dilatation of esophageal stricture           PAST SURGICAL HISTORY:  Past Surgical History:   Procedure Laterality Date    ANKLE SURGERY Left     ORIF    COLONOSCOPY  01/2018    Dr Diane Thurston Right 7/21/2022    RIGHT KNEE EXPLANT & PLACEMENT OF ANTIBIOTIC SPACER performed by Marleen Cruz MD at [address]  06/11/2018    Dr Haley-[facility]        SOCIAL HISTORY:  Social History     Socioeconomic History    Marital status:    Tobacco Use    Smoking status: Never    Smokeless tobacco: Never   Substance and Sexual Activity    Alcohol use: Never       FAMILY HISTORY:  Family History   Problem Relation Age of Onset    Colon Cancer Mother     Liver Cancer Mother         mets    Dementia Father         Current Outpatient Medications   Medication Sig Dispense Refill    alendronate (FOSAMAX) 70 MG tablet Take 1 tablet by mouth every 7 days Take with a full glass of water upon arising for the day. Consume on an empty stomach at least 30 minutes before the first food, beverage, or medication. Stay upright (do not lie down) for at least 30 minutes. Do not crush or break. 4 tablet 3    doxycycline hyclate (VIBRAMYCIN) 100 MG capsule Take 100 mg by mouth 2 times daily      acetaminophen (TYLENOL) 325 MG tablet Take 650 mg by mouth every 8 hours as needed for Pain      vitamin B-12 (CYANOCOBALAMIN) 500 MCG tablet Take 500 mcg by mouth every 48 hours      aspirin EC 81 MG EC tablet Take 1 tablet by mouth in the morning and 1 tablet before bedtime. 60 tablet 0    estradiol (ESTRACE) 0.1 MG/GM vaginal cream Place 2 g vaginally daily Indications: Vaginitis      Probiotic Product (PROBIOTIC DAILY PO) Take 1 tablet by mouth daily Indications: Treatment for the Prevention of Constipation      atorvastatin (LIPITOR) 10 MG tablet Take 10 mg by mouth every evening Indications: High Amount of Fats in the Blood      cyclobenzaprine (FLEXERIL) 5 MG tablet Take 5 mg by mouth in the morning and 5 mg before bedtime. Indications: Restless Leg Syndrome. iron polysaccharides (NIFEREX) 150 MG capsule Take 150 mg by mouth in the morning. Indications: Anemia.      indapamide (LOZOL) 1.25 MG tablet Take 1.25 mg by mouth in the morning. Indications: Treatment with Diuretic Therapy. losartan (COZAAR) 100 MG tablet Take 100 mg by mouth daily      metFORMIN (GLUCOPHAGE) 500 MG tablet Take 500 mg by mouth every morning (before breakfast) Indications: Diabetes      rOPINIRole (REQUIP) 1 MG tablet Take 1 mg by mouth nightly      tolterodine (DETROL LA) 4 MG extended release capsule Take 4 mg by mouth nightly Indications: Bladder Spasm      levocetirizine (XYZAL) 5 MG tablet Take 5 mg by mouth daily as needed Indications:  Allergic Rhinitis      Coenzyme Q10 10 MG CAPS Take 200 mg by mouth daily Indications: Treatment for the Prevention of Constipation      HYDROcodone-acetaminophen ([facility]) 5-325 MG per tablet Take 1 tablet by mouth every 6 hours as needed for Pain. omeprazole (PRILOSEC) 20 MG delayed release capsule Take 20 mg by mouth in the morning. Indications: Gastroesophageal Reflux Disease. No current facility-administered medications for this visit. REVIEW OF SYSTEMS:   CONSTITUTIONAL: no fever, no night sweats, no fatigue;  HEENT: no blurring of vision, no double vision, no hearing difficulty, no tinnitus, no ulceration, no dysplasia, no epistaxis;   LUNGS: no cough, no hemoptysis, no wheeze,  no shortness of breath;  CARDIOVASCULAR: no palpitation, no chest pain, no shortness of breath;  GI: no abdominal pain, no nausea, no vomiting, no diarrhea, no constipation;  ELROY: no dysuria, no hematuria, no frequency or urgency, no nephrolithiasis;  MUSCULOSKELETAL: no joint pain, no swelling, no stiffness;  ENDOCRINE: no polyuria, no polydipsia, no cold or heat intolerance;  HEMATOLOGY: no easy bruising or bleeding, no history of clotting disorder;  DERMATOLOGY: no skin rash, no eczema, no pruritus;  PSYCHIATRY: no depression, no anxiety, no panic attacks, no suicidal ideation, no homicidal ideation;  NEUROLOGY: no syncope, no seizures, no numbness or tingling of hands, no numbness or tingling of feet, no paresis;     Vitals signs: There were no vitals taken for this visit. Pain scale:      PHYSICAL EXAM:  CONSTITUTIONAL: Alert, appropriate, no acute distress,   EYES: Non icteric, EOM intact, pupils equal round and reactive to light and accommodation. ENT: Oral mucus membranes moist, no oral pharyngeal lesions. External inspection of ears and nose are normal.   NECK: Supple, no masses. No palpable thyroid mass    CHEST/LUNGS: CTA bilaterally, normal respiratory effort   CARDIOVASCULAR: tchycardic, no murmurs. No lower extremity edema   ABDOMEN: soft non-tender, active bowel sounds, no hepatosplenomegaly. No palpable masses. EXTREMITIES: warm, Full ROM of all fours extremities. No focal weakness. SKIN: warm, dry with no rashes or lesions  LYMPH: No cervical, clavicular, axillary, or inguinal lymphadenopathy  NEUROLOGIC: follows commands, non focal.   PSYCH: mood and affect appropriate. Alert and oriented to time and place and person. Relevant Lab findings/reviewed by me:  1/19/23 CBC [facility])    1/19/23 CMP [facility])    Relevant Imaging studies/reviewed by me:  12/28/22 Bilateral  Mammogram ([facility])No suspicious abnormalities in Right or left breast.     ASSESSMENT    Orders Placed This Encounter   Procedures    CBC with Auto Differential     PLEASE FAX RESULTS TO DR Marisel Benavidez [phone]     Standing Status:   Future     Standing Expiration Date:   7/31/2024    Comprehensive Metabolic Panel     PLEASE FAX RESULTS TO DR Marisel Benavidez [phone]     Standing Status:   Future     Standing Expiration Date:   7/31/2024              Ewa Wills was seen today for follow-up. Diagnoses and all orders for this visit:    Iron deficiency anemia secondary to blood loss (chronic)  -     Cancel: CBC with Auto Differential; Future  -     Cancel: Comprehensive Metabolic Panel; Future  -     CBC with Auto Differential; Future  -     Comprehensive Metabolic Panel; Future    Hypercalcemia    Type 2 diabetes mellitus without complication, unspecified whether long term insulin use (HCC)    Anemia in stage 3 chronic kidney disease, unspecified whether stage 3a or 3b CKD ([facility] [address]    Care plan discussed with patient    Renal function impairment          History of iron deficiency anemia secondary to upper GI ulcers (healed): Anemia is multifactorial to include CKD stage IIIa, anemia of iron deficiency, anemia of chronic disease  CBC today reveals a hemoglobin 10.3  April 2022-ferritin 201, iron saturation 45%, TIBC 279  Creatinine 1.24, GFR 55  10/19/22 Iron Studies ([facility]):Iron 45, TIBC 279, Iron Sat 16, Ferritin 201    B12 deficiency-continue oral B12 replacement.     DM type II-last hemoglobin A1c 7    Hypertension-systolic blood pressure slightly higher  -Educated about keeping her blood pressure under control    Hypercalcemia-concern for hypercalcemia of malignancy  - Hospitalized calcium 14, [address] [facility]  -Zometa 4 mg IV was given in the hospital  -Calcium improved, normalized 8.9 on 10/21/2022  -SPEP and free light chains. Normal kappa/lambda ratio  -No calcium suppl. No Vit D intake  -Normal vitamin D levels  -PTH reportedly low, 13(L)  -PTH-r protein elevated 4.9 on 09/21  -10/26/2022 CT Chest W Contrast No acute process in CT scan of the chest with contrast enhancement. Small to moderate hiatal hernia. Cholelithiasis. This CT exam was performed using one or more of the following dose reduction techniques: automated exposure control, adjustment of the mA and/or kV according to patient size, and/or use of iterative reconstruction technique. 10/26/2022 CT Abd/Pelvis W IV Contrast (oral) No acute pathology seen in abdomen or pelvis. Cholelithiasis. Small hiatal hernia. 11/8/2022-CMP showed calcium 9.4, creatinine 1.21. Total protein 6.8. Normal LFTs. 10/26/2022-PTH-49 (normal)    At this point, no clear current evidence of malignancy. She denies any new GI symptomatology. She had a colonoscopy 2021  She denies any dysphagia. CT chest abdomen pelvis showed no evidence of lung or pancreatic malignancy. No evidence of lymphadenopathy. She has no complaints of bone pain. CT chest abdomen pelvis did not report any evidence of bone metastasis. I favor to follow-up with her conservatively at this time. We will address and perform further work-up if she has any localized symptoms. We will continue to monitor her calcium levels closely. CKD stage - cr 1.44 (previously 1.21). Patient advised to increase p.o. intake of fluids. Hypercalcemia-calcium 10.7.  -Repeat CMP every 2 weeks  -Agree with referral to endocrinology.   Patient will call PCP for referral.      Plan:  RTC with MD in [facility] January 2023  Repeat CMP  Discontinue OTC Ferrous Sulfate daily 325 mg due to poor oral tolerance  Follow-up with nephrology  Agree with referral to endocrinology    Follow Up:     Return in about 4 weeks (around 2/28/2023) for CBC CMP Appointment with Dr. Peri Godoy. CBC CMP 2 weeks at [facility]     Xuan MERCHANT am pre charting  as Medical Assistant for Guerita Garland MD. Electronically signed by Xuan Theodore MA on 1/31/2023 at 11:44 AM CST. Katt Kennedy am scribing as Medical Assistant for Guerita Garland MD. Electronically signed by Xuan Theodore MA on 1/31/2023 at 11:37 AM CST. SHONDA, Dr Shonna Hong, personally performed the services described in this documentation as scribed by Xuan Theodore MA in my presence and is both accurate and complete. Joaquina Phillips am scribing for Guerita Garland MD. Electronically signed by Gamaliel Flynn RN on 1/31/2023 at 11:37 AM CST. I, Dr Shonna Hong, personally performed the services described in this documentation as scribed by Gamaliel Flynn RN in my presence and is both accurate and complete. I have seen, examined and reviewed this patient medication list, appropriate labs and imaging studies. I reviewed relevant medical records and others physicians notes. I discussed the plans of care with the patient. I answered all the questions to the patients satisfaction. I have also reviewed the chief complaint (CC) and part of the history (History of Present Illness (HPI), Past Family Social History [facility]), or Review of Systems (ROS) and made changes when appropriated. (Please note that portions of this note were completed with a voice recognition program. Efforts were made to edit the dictations but occasionally words are mis-transcribed. )Electronically signed by Guerita Garland MD on 1/31/2023 at 11:57 AM         Reshma Rosario is a 68 y.o. female evaluated via telephone on 1/31/2023 for Follow-up  .         Documentation:  I communicated with the patient and/or health care decision maker about as above. Details of this discussion including any medical advice provided: as above    Total Time: minutes: 11-20 minutes    Gen Patton was evaluated through a synchronous (real-time) audio encounter. Patient identification was verified at the start of the visit. She (or guardian if applicable) is aware that this is a billable service, which includes applicable co-pays. This visit was conducted with the patient's (and/or legal guardian's) verbal consent. She has not had a related appointment within my department in the past 7 days or scheduled within the next 24 hours. The patient was located at Home: [facility]. The provider was located at [place] [address]): [address] [place] [address], [address] [place] [address],  [place] [address].     Note: not billable if this call serves to triage the patient into an appointment for the relevant concern    Perez Pineda MD

## 2023-01-31 ENCOUNTER — TELEMEDICINE (OUTPATIENT)
Dept: HEMATOLOGY | Age: 73
End: 2023-01-31
Payer: MEDICARE

## 2023-01-31 DIAGNOSIS — N18.30 ANEMIA IN STAGE 3 CHRONIC KIDNEY DISEASE, UNSPECIFIED WHETHER STAGE 3A OR 3B CKD (HCC): ICD-10-CM

## 2023-01-31 DIAGNOSIS — N28.9 RENAL FUNCTION IMPAIRMENT: ICD-10-CM

## 2023-01-31 DIAGNOSIS — D50.0 IRON DEFICIENCY ANEMIA SECONDARY TO BLOOD LOSS (CHRONIC): Primary | ICD-10-CM

## 2023-01-31 DIAGNOSIS — E11.9 TYPE 2 DIABETES MELLITUS WITHOUT COMPLICATION, UNSPECIFIED WHETHER LONG TERM INSULIN USE (HCC): ICD-10-CM

## 2023-01-31 DIAGNOSIS — E83.52 HYPERCALCEMIA: ICD-10-CM

## 2023-01-31 DIAGNOSIS — Z71.89 CARE PLAN DISCUSSED WITH PATIENT: ICD-10-CM

## 2023-01-31 DIAGNOSIS — D63.1 ANEMIA IN STAGE 3 CHRONIC KIDNEY DISEASE, UNSPECIFIED WHETHER STAGE 3A OR 3B CKD (HCC): ICD-10-CM

## 2023-01-31 PROCEDURE — 99442 PR PHYS/QHP TELEPHONE EVALUATION 11-20 MIN: CPT | Performed by: INTERNAL MEDICINE

## 2023-02-23 NOTE — PROGRESS NOTES
Maryann Ware   1950 2/28/2023     Chief Complaint   Patient presents with    Follow-up     Anemia in stage 3 chronic kidney disease, unspecified whether stage 3a or 3b CKD ([address]       INTERVAL HISTORY/HISTORY OF PRESENT ILLNESS:  The patient is a pleasant 68years old female who has been diagnosed with iron deficiency anemia. In addition, she has chronic kidney disease stage IIIa. She has a history hypertension/diabetes. He was hospitalized 3 times here at [facility] and also [facility] with hypercalcemia. PTH was slightly low and PTH related protein was high. SPEP was unremarkable. Vitamin B12 was normal.  The patient had a colonoscopy last year that was unremarkable. She denies any breast complaints. The breast exam was normal and performed by me. She denies any other symptoms. Specifically, no weight loss. She had Zometa 1 time in the hospital with normalization of her calcium levels last year. She had a repeat CMP at that showed calcium level 11.9. She has been experiencing more constipation. Denies any change in urine output. She has been referred to the endocrinology by her PCP. She is accompanied by her  today. Diagnosis   Iron deficiency secondary to upper GI ulcers   Normal colonoscopy January 2018. Chronic kidney disease stage IIIb  Hypercalcemia  Treatment summary  PO Niferex every other day   Sep 2018-Injectafer 1500mg      Interval history  Mrs. Leisa Hillman is a 68year-old  female with a history of iron deficiency anemia secondary to GI blood loss from gastric ulcers. . She continues to take PO iron replacement, Niferex, once daily and tolerating without significant difficulty. She was recently seen by her primary care provider. Her hemoglobin was 10.6. She was taking Niferex every other day and was instructed to take it daily. She has no new complaints today. Denies any  melena, hematochezia, change in the color of her stools. She denies any epigastric pain.   She denies any use of NSAIDs. She denies any hematuria. CBC today showed improvement of hemoglobin to 11.0. HEMATOLOGY HISTORY: Iron deficiency anemia 2/2 upper GI ulcers  Chris Teran was seen in initial hematology consultation on 1/20/6593 referred by Ana GONZALEZ for persistent iron deficiency anemia. Chris Teran reports that she was started on Niferex 150 mg daily on 11/13/2017. A CBC that is available from 11/28/2017 revealed a WBC of 6, Hgb 10.8, MCV 86, PLT of 254,000. Serum iron was 57. Most recent follow-up endoscopy was performed on 6/11/2018 which revealed 2 superficial gastric ulcers within the antrum of the stomach and healing of the former duodenal ulcers. Mild to moderate gastritis of the antrum of the stomach was noted. She did have a distal esophageal stricture that was dilated. 1/29/2018-normal colonoscopy. Upper EGD showed 7 gastric ulcers and 4 duodenal ulcers. CBC 2/13/2018 revealed a WBC of 6.7, Hgb 10.4 with MCV 85.3 and PLT of 222,000.   07/20/2018-WBC 8.2, hemoglobin 11.5, PLT of 213,000. Ferritin 52, iron saturation 14%, TIBC 258.   9/5/2018-recommended 2 doses of IV Injectafer 750 mg. September 2018- s/p 1500mg Injectafer IV.   10/23/2018- CBC Hb 11.8/MCV90. Normal WBC/Plts. 1/22/2019-CBC with hemoglobin 11.6.   7/23/2019- CBC with a hemoglobin of 11.9 and MCV 92.1, continues to take Niferex daily  01/21/2020- CBC Hb 11.4/91. WBC 5.2, Platelets 005P. Iron 46, TIBC 234, iron saturation 20%  4/6/2021-B12 461  4/27/2021-hemoglobin 11, WBC 5.9, platelets 512,818  7/91/6833-RYTMALFH Niferex 100 mg p.o. daily  4/22/2022- hemoglobin 0.8, creatinine 1.4, GFR 39, ferritin 255, TIBC 225, iron saturation 18%. 4/26/2022- resume iron sulfate 325 mg p.o. daily  September 2022-she was hospitalized at [facility] with hypercalcemia. PTH low, PTH related protein was high. She received Zometa. Normalization of her calcium levels.   09/09/22- PTH 13 (L)  10/19/22 Iron Studies ([facility]):Iron 45, TIBC 279, Iron Sat 16, Ferritin 201  10/26/2022 CT Chest W Contrast No acute process in CT scan of the chest with contrast enhancement. Small to moderate hiatal hernia. Cholelithiasis. This CT exam was performed using one or more of the following dose reduction techniques: automated exposure control, adjustment of the mA and/or kV according to patient size, and/or use of iterative reconstruction technique. 10/26/2022 CT Abd/Pelvis W IV Contrast (oral) No acute pathology seen in abdomen or pelvis. Cholelithiasis. Small hiatal hernia.    11/8/22 PTH ([facility] [address]) 49, normal  11/23/22-Calcium 9.1  12/28/22 Bilateral  Mammogram ([facility])No suspicious abnormalities in Right or left breast.  1/19/23 Calcium ([address] [place] [address]) 10.7          PAST MEDICAL HISTORY:   Past Medical History:   Diagnosis Date    Carotid stenosis     Colonic polyp     DM (diabetes mellitus) [address] [address]     Essential hypertension     Gastric ulcer     duodenal    GERD (gastroesophageal reflux disease)     Hiatal hernia     Hyperlipidemia     Iron deficiency anemia secondary to blood loss (chronic)     OA (osteoarthritis)     Status post dilatation of esophageal stricture           PAST SURGICAL HISTORY:  Past Surgical History:   Procedure Laterality Date    ANKLE SURGERY Left     ORIF    COLONOSCOPY  01/2018    Dr Janneth Walter Right 7/21/2022    RIGHT KNEE EXPLANT & PLACEMENT OF ANTIBIOTIC SPACER performed by Melissa Arevalo MD at [address] [place] [address] ENDOSCOPY  06/11/2018    Dr [facility]        SOCIAL HISTORY:  Social History     Socioeconomic History    Marital status:      Spouse name: None    Number of children: None    Years of education: None    Highest education level: None   Tobacco Use    Smoking status: Never    Smokeless tobacco: Never   Substance and Sexual Activity    Alcohol use: Never       FAMILY HISTORY:  Family History   Problem Relation Age of Onset    Colon Cancer Mother     Liver Cancer Mother         mets    Dementia Father         Current Outpatient Medications   Medication Sig Dispense Refill    alendronate (FOSAMAX) 70 MG tablet Take 1 tablet by mouth every 7 days Take with a full glass of water upon arising for the day. Consume on an empty stomach at least 30 minutes before the first food, beverage, or medication. Stay upright (do not lie down) for at least 30 minutes. Do not crush or break. 4 tablet 3    HYDROcodone-acetaminophen (NORCO) 5-325 MG per tablet Take 1 tablet by mouth every 6 hours as needed for Pain. acetaminophen (TYLENOL) 325 MG tablet Take 650 mg by mouth every 8 hours as needed for Pain      vitamin B-12 (CYANOCOBALAMIN) 500 MCG tablet Take 500 mcg by mouth every 48 hours      aspirin EC 81 MG EC tablet Take 1 tablet by mouth in the morning and 1 tablet before bedtime. 60 tablet 0    estradiol (ESTRACE) 0.1 MG/GM vaginal cream Place 2 g vaginally daily Indications: Vaginitis      Probiotic Product (PROBIOTIC DAILY PO) Take 1 tablet by mouth daily Indications: Treatment for the Prevention of Constipation      omeprazole (PRILOSEC) 20 MG delayed release capsule Take 20 mg by mouth in the morning. Indications: Gastroesophageal Reflux Disease. atorvastatin (LIPITOR) 10 MG tablet Take 10 mg by mouth every evening Indications: High Amount of Fats in the Blood      cyclobenzaprine (FLEXERIL) 5 MG tablet Take 5 mg by mouth in the morning and 5 mg before bedtime. Indications: Restless Leg Syndrome. iron polysaccharides (NIFEREX) 150 MG capsule Take 150 mg by mouth in the morning.  Indications: Anemia.      losartan (COZAAR) 100 MG tablet Take 100 mg by mouth daily      metFORMIN (GLUCOPHAGE) 500 MG tablet Take 500 mg by mouth every morning (before breakfast) Indications: Diabetes      rOPINIRole (REQUIP) 1 MG tablet Take 1 mg by mouth nightly      tolterodine (DETROL LA) 4 MG extended release capsule Take 4 mg by mouth nightly Indications: Bladder Spasm levocetirizine (XYZAL) 5 MG tablet Take 5 mg by mouth daily as needed Indications: Allergic Rhinitis      Coenzyme Q10 10 MG CAPS Take 200 mg by mouth daily Indications: Treatment for the Prevention of Constipation      indapamide (LOZOL) 1.25 MG tablet Take 1.25 mg by mouth in the morning. Indications: Treatment with Diuretic Therapy. (Patient not taking: Reported on 2/28/2023)       No current facility-administered medications for this visit. REVIEW OF SYSTEMS:   CONSTITUTIONAL: no fever, no night sweats, fatigue;  HEENT: no blurring of vision, no double vision, no hearing difficulty, no tinnitus, no ulceration, no dysplasia, no epistaxis;   LUNGS: no cough, no hemoptysis, no wheeze,  no shortness of breath;  CARDIOVASCULAR: no palpitation, no chest pain, no shortness of breath;  GI: no abdominal pain, no nausea, no vomiting, no diarrhea, no constipation;  ELROY: no dysuria, no hematuria, no frequency or urgency, no nephrolithiasis;  MUSCULOSKELETAL: no joint pain, no swelling, no stiffness;  ENDOCRINE: no polyuria, no polydipsia, no cold or heat intolerance;  HEMATOLOGY: no easy bruising or bleeding, no history of clotting disorder;  DERMATOLOGY: no skin rash, no eczema, no pruritus;  PSYCHIATRY: no depression, no anxiety, no panic attacks, no suicidal ideation, no homicidal ideation;  NEUROLOGY: no syncope, no seizures, no numbness or tingling of hands, no numbness or tingling of feet, no paresis;     Vitals signs:  BP (!) 146/64 (Site: Right Upper Arm, Position: Sitting, Cuff Size: Medium Adult)   Pulse 94   Temp 97.9 °F (36.6 °C) (Oral)   Ht 5' 6\" (1.676 m)   Wt 188 lb (85.3 kg)   SpO2 97%   BMI 30.34 kg/m²    Pain scale:  Pain Score:   7   PHYSICAL EXAM:  CONSTITUTIONAL: Alert, appropriate, no acute distress,   EYES: Non icteric, EOM intact, pupils equal round and reactive to light and accommodation. ENT: Oral mucus membranes moist, no oral pharyngeal lesions.  External inspection of ears and nose are normal.   NECK: Supple, no masses. No palpable thyroid mass    CHEST/LUNGS: CTA bilaterally, normal respiratory effort   CARDIOVASCULAR: tchycardic, no murmurs. No lower extremity edema   ABDOMEN: soft non-tender, active bowel sounds, no hepatosplenomegaly. No palpable masses. EXTREMITIES: warm, Full ROM of all fours extremities. No focal weakness. SKIN: warm, dry with no rashes or lesions  LYMPH: No cervical, clavicular, axillary, or inguinal lymphadenopathy  NEUROLOGIC: follows commands, non focal.   PSYCH: mood and affect appropriate. Alert and oriented to time and place and person. Relevant Lab findings/reviewed by me:  2/14/23 CBC [facility])        2/24/23 CMP [facility]) WNL except BUN/Creat 27,  Creat 2 1.48,Calcium 11. 9,Alk/ Phos 143, EGFR 37    Relevant Imaging studies/reviewed by me:  None     ASSESSMENT    Orders Placed This Encounter   Procedures    PTH, Intact     Standing Status:   Future     Standing Expiration Date:   2/28/2024    PTH-Related Peptide     Standing Status:   Future     Standing Expiration Date:   2/28/2024    Vitamin D 25 Hydroxy     Standing Status:   Future     Standing Expiration Date:   2/28/2024    Vitamin D 1,25 Dihydroxy     Standing Status:   Future     Standing Expiration Date:   2/28/2024    Comprehensive Metabolic Panel     Standing Status:   Standing     Number of Occurrences:   26     Standing Expiration Date:   2/28/2024    CBC with Auto Differential     Standing Status:   Standing     Number of Occurrences:   26     Standing Expiration Date:   2/28/2024        Rubina Powell was seen today for follow-up. Diagnoses and all orders for this visit:    Hypercalcemia  -     PTH, Intact; Future  -     PTH-Related Peptide; Future  -     Vitamin D 25 Hydroxy; Future  -     Vitamin D 1,25 Dihydroxy;  Future  -     Comprehensive Metabolic Panel; Standing  -     CBC with Auto Differential; Standing    Anemia in stage 3 chronic kidney disease, unspecified whether stage 3a or 3b CKD ([address]  - CBC with Auto Differential; Standing    Care plan discussed with patient    Renal function impairment      History of iron deficiency anemia secondary to upper GI ulcers (healed): Anemia is multifactorial to include CKD stage IIIa, anemia of iron deficiency, anemia of chronic disease  CBC today reveals a hemoglobin 10.3  April 2022-ferritin 201, iron saturation 45%, TIBC 279  Creatinine 1.24, GFR 55  10/19/22 Iron Studies ([facility]):Iron 45, TIBC 279, Iron Sat 16, Ferritin 201    B12 deficiency-continue oral B12 replacement. DM type II-last hemoglobin A1c 7    Hypertension-systolic blood pressure slightly higher  -Educated about keeping her blood pressure under control    Hypercalcemia-concern for hypercalcemia of malignancy  - [facility]  -Zometa 4 mg IV was given in the hospital  -Calcium improved, normalized 8.9 on 10/21/2022  -SPEP and free light chains. Normal kappa/lambda ratio  -No calcium suppl. No Vit D intake  -Normal vitamin D levels  -PTH reportedly low, 13(L)  -PTH-r protein elevated 4.9 on 09/21  -10/26/2022 CT Chest W Contrast No acute process in CT scan of the chest with contrast enhancement. Small to moderate hiatal hernia. Cholelithiasis. This CT exam was performed using one or more of the following dose reduction techniques: automated exposure control, adjustment of the mA and/or kV according to patient size, and/or use of iterative reconstruction technique. 10/26/2022 CT Abd/Pelvis W IV Contrast (oral) No acute pathology seen in abdomen or pelvis. Cholelithiasis. Small hiatal hernia. 11/8/2022-CMP showed calcium 9.4, creatinine 1.21. Total protein 6.8. Normal LFTs. 10/26/2022-PTH-49 (normal)  February 2023-calcium levels have increased to 11.9. The patient seems constipation. Zometa 4 mg IV ASAP. Working on endocrinology appointment. At this point, no clear current evidence of malignancy. She denies any new GI symptomatology.  She had a colonoscopy 2021  She denies any dysphagia. CT chest abdomen pelvis showed no evidence of lung or pancreatic malignancy. No evidence of lymphadenopathy. She has no complaints of bone pain. CT chest abdomen pelvis did not report any evidence of bone metastasis. I favor to follow-up with her conservatively at this time. We will address and perform further work-up if she has any localized symptoms. We will continue to monitor her calcium levels closely. CKD stage - cr 1.48 (previously 1.21). Patient advised to increase p.o. intake of fluids. Hypercalcemia-calcium 10.7->11.9.  -Repeat CMP 2 weeks after zometa  -Zometa 4 mg IV ASAP  -PTH, PTH related protein, Vit D 1,25 and Vit D 25 HO  -Agree with referral to endocrinology. Patient will call PCP for referral.      Plan:  RTC with MD in 4 weeks  CBC today  Zometa 4 mg IV ASAP for hypercalcemia  PTH, PTH related peptide, Vitamin D1, 25, Vitamin D 25 same day as Zometa  Repeat CMP 2 weeks after Zometa and prior to next MD visit-orders given  Follow-up with nephrology  Agree with referral to endocrinology    Follow Up:     Return in about 4 weeks (around 3/28/2023) for Appointment with Dr. Armani Seo in [place]. Zometa & labs ASAP in [place] office  CMP 2 weeks after Zometa & prior to next MD visit     Julian Morris am pre charting  as Medical Assistant for Rebecca Pepper MD. Electronically signed by Pepper Hoff MA on 2/28/2023 at 10:51 AM CST. Sai Lugo am scribing for Rebecca Pepper MD. Electronically signed by Lydia Bell RN on 2/28/2023 at 12:10 PM CST. I, Dr Alexander Gabriel, personally performed the services described in this documentation as scribed by Lydia Bell RN in my presence and is both accurate and complete. I have seen, examined and reviewed this patient medication list, appropriate labs and imaging studies. I reviewed relevant medical records and others physicians notes. I discussed the plans of care with the patient.  I answered all the questions to the patients satisfaction. I have also reviewed the chief complaint (CC) and part of the history (History of Present Illness (HPI), Past Family Social History [facility]), or Review of Systems (ROS) and made changes when appropriated. (Please note that portions of this note were completed with a voice recognition program. Efforts were made to edit the dictations but occasionally words are mis-transcribed. )Electronically signed by Felix Aldridge MD on 2/28/2023 at 12:21 PM

## 2023-02-28 ENCOUNTER — OFFICE VISIT (OUTPATIENT)
Dept: HEMATOLOGY | Age: 73
End: 2023-02-28
Payer: MEDICARE

## 2023-02-28 VITALS
HEIGHT: 66 IN | WEIGHT: 188 LBS | HEART RATE: 94 BPM | DIASTOLIC BLOOD PRESSURE: 64 MMHG | TEMPERATURE: 97.9 F | OXYGEN SATURATION: 97 % | SYSTOLIC BLOOD PRESSURE: 146 MMHG | BODY MASS INDEX: 30.22 KG/M2

## 2023-02-28 DIAGNOSIS — E83.52 HYPERCALCEMIA: Primary | ICD-10-CM

## 2023-02-28 DIAGNOSIS — Z71.89 CARE PLAN DISCUSSED WITH PATIENT: ICD-10-CM

## 2023-02-28 DIAGNOSIS — N18.30 ANEMIA IN STAGE 3 CHRONIC KIDNEY DISEASE, UNSPECIFIED WHETHER STAGE 3A OR 3B CKD (HCC): ICD-10-CM

## 2023-02-28 DIAGNOSIS — N28.9 RENAL FUNCTION IMPAIRMENT: ICD-10-CM

## 2023-02-28 DIAGNOSIS — D63.1 ANEMIA IN STAGE 3 CHRONIC KIDNEY DISEASE, UNSPECIFIED WHETHER STAGE 3A OR 3B CKD (HCC): ICD-10-CM

## 2023-02-28 PROCEDURE — 1090F PRES/ABSN URINE INCON ASSESS: CPT | Performed by: INTERNAL MEDICINE

## 2023-02-28 PROCEDURE — G8417 CALC BMI ABV UP PARAM F/U: HCPCS | Performed by: INTERNAL MEDICINE

## 2023-02-28 PROCEDURE — 3077F SYST BP >= 140 MM HG: CPT | Performed by: INTERNAL MEDICINE

## 2023-02-28 PROCEDURE — 1123F ACP DISCUSS/DSCN MKR DOCD: CPT | Performed by: INTERNAL MEDICINE

## 2023-02-28 PROCEDURE — 3017F COLORECTAL CA SCREEN DOC REV: CPT | Performed by: INTERNAL MEDICINE

## 2023-02-28 PROCEDURE — G8484 FLU IMMUNIZE NO ADMIN: HCPCS | Performed by: INTERNAL MEDICINE

## 2023-02-28 PROCEDURE — 99214 OFFICE O/P EST MOD 30 MIN: CPT | Performed by: INTERNAL MEDICINE

## 2023-02-28 PROCEDURE — 3078F DIAST BP <80 MM HG: CPT | Performed by: INTERNAL MEDICINE

## 2023-02-28 PROCEDURE — 1036F TOBACCO NON-USER: CPT | Performed by: INTERNAL MEDICINE

## 2023-02-28 PROCEDURE — G8400 PT W/DXA NO RESULTS DOC: HCPCS | Performed by: INTERNAL MEDICINE

## 2023-02-28 PROCEDURE — G8427 DOCREV CUR MEDS BY ELIG CLIN: HCPCS | Performed by: INTERNAL MEDICINE

## 2023-03-01 ENCOUNTER — TELEPHONE (OUTPATIENT)
Dept: HEMATOLOGY | Age: 73
End: 2023-03-01

## 2023-03-01 ENCOUNTER — HOSPITAL ENCOUNTER (OUTPATIENT)
Dept: INFUSION THERAPY | Age: 73
Discharge: HOME | End: 2023-03-01
Payer: MEDICARE

## 2023-03-01 VITALS
SYSTOLIC BLOOD PRESSURE: 154 MMHG | DIASTOLIC BLOOD PRESSURE: 78 MMHG | HEIGHT: 66 IN | BODY MASS INDEX: 30.2 KG/M2 | OXYGEN SATURATION: 99 % | HEART RATE: 94 BPM | RESPIRATION RATE: 18 BRPM | WEIGHT: 187.9 LBS | TEMPERATURE: 99.1 F

## 2023-03-01 DIAGNOSIS — D63.1 ANEMIA IN STAGE 3 CHRONIC KIDNEY DISEASE, UNSPECIFIED WHETHER STAGE 3A OR 3B CKD (HCC): ICD-10-CM

## 2023-03-01 DIAGNOSIS — E83.52 SERUM CALCIUM ELEVATED: Primary | ICD-10-CM

## 2023-03-01 DIAGNOSIS — N18.30 ANEMIA IN STAGE 3 CHRONIC KIDNEY DISEASE, UNSPECIFIED WHETHER STAGE 3A OR 3B CKD (HCC): ICD-10-CM

## 2023-03-01 DIAGNOSIS — E83.52 HYPERCALCEMIA: Primary | ICD-10-CM

## 2023-03-01 DIAGNOSIS — E83.52 HYPERCALCEMIA: ICD-10-CM

## 2023-03-01 LAB
ALBUMIN SERPL-MCNC: 3.9 G/DL (ref 3.5–5.2)
ALP BLD-CCNC: 137 U/L (ref 35–104)
ALT SERPL-CCNC: 23 U/L (ref 5–33)
ANION GAP SERPL CALCULATED.3IONS-SCNC: 14 MMOL/L (ref 7–19)
AST SERPL-CCNC: 25 U/L (ref 5–32)
BILIRUB SERPL-MCNC: <0.2 MG/DL (ref 0.2–1.2)
BUN BLDV-MCNC: 31 MG/DL (ref 8–23)
CALCIUM SERPL-MCNC: 12.8 MG/DL (ref 8.8–10.2)
CHLORIDE BLD-SCNC: 101 MMOL/L (ref 98–111)
CO2: 25 MMOL/L (ref 22–29)
CREAT SERPL-MCNC: 1.3 MG/DL (ref 0.5–0.9)
GFR SERPL CREATININE-BSD FRML MDRD: 44 ML/MIN/{1.73_M2}
GLUCOSE BLD-MCNC: 105 MG/DL (ref 74–109)
PARATHYROID HORMONE INTACT: 12.6 PG/ML (ref 15–65)
POTASSIUM SERPL-SCNC: 4.1 MMOL/L (ref 3.5–5)
SODIUM BLD-SCNC: 140 MMOL/L (ref 136–145)
TOTAL PROTEIN: 6.9 G/DL (ref 6.6–8.7)
VITAMIN D 25-HYDROXY: 39.1 NG/ML

## 2023-03-01 PROCEDURE — 2580000003 HC RX 258: Performed by: INTERNAL MEDICINE

## 2023-03-01 PROCEDURE — 36415 COLL VENOUS BLD VENIPUNCTURE: CPT

## 2023-03-01 PROCEDURE — 96367 TX/PROPH/DG ADDL SEQ IV INF: CPT

## 2023-03-01 PROCEDURE — 6360000002 HC RX W HCPCS: Performed by: INTERNAL MEDICINE

## 2023-03-01 PROCEDURE — 96365 THER/PROPH/DIAG IV INF INIT: CPT

## 2023-03-01 PROCEDURE — 85025 COMPLETE CBC W/AUTO DIFF WBC: CPT

## 2023-03-01 RX ORDER — SODIUM CHLORIDE 9 MG/ML
5-40 INJECTION INTRAVENOUS PRN
Status: CANCELLED | OUTPATIENT
Start: 2023-03-07

## 2023-03-01 RX ORDER — SODIUM CHLORIDE 9 MG/ML
5-40 INJECTION INTRAVENOUS PRN
Status: DISCONTINUED | OUTPATIENT
Start: 2023-03-01 | End: 2023-03-02 | Stop reason: HOSPADM

## 2023-03-01 RX ADMIN — ZOLEDRONIC ACID 4 MG: 4 INJECTION, SOLUTION, CONCENTRATE INTRAVENOUS at 16:02

## 2023-03-01 RX ADMIN — SODIUM CHLORIDE, PRESERVATIVE FREE 10 ML: 5 INJECTION INTRAVENOUS at 16:19

## 2023-03-01 NOTE — PROGRESS NOTES
Pt called stating [facility] can't see pt until July 2023. Discussed with Dr Kami Bo who stated this is unacceptable and recommends referral to [facility] Endocrinology for non-malignant hypercalcemia. Poss referral discussed with pt at 2/28/23 office visit. Call to pt to inform of [facility] referral and that Zometa is approved. Appt sched for infusion and blood draw today.

## 2023-03-01 NOTE — TELEPHONE ENCOUNTER
MRS. CHAIREZ CALLED TO LET US KNOW THAT DR MCPHERSON'S OFFICE PUT HER DOWN FOR July 7TH TO BE SEEN IN THEIR OFFICE. MESSAGE WAS SENT TO JA DUPREE AS WELL.

## 2023-03-02 ENCOUNTER — TELEPHONE (OUTPATIENT)
Dept: INFUSION THERAPY | Age: 73
End: 2023-03-02

## 2023-03-02 LAB
HCT VFR BLD CALC: 34.3 % (ref 34.1–44.9)
HEMOGLOBIN: 10.7 G/DL (ref 11.2–15.7)
LYMPHOCYTES ABSOLUTE: 1.08 K/UL (ref 1.18–3.74)
LYMPHOCYTES RELATIVE PERCENT: 20.3 % (ref 19.3–53.1)
MAGNESIUM: 2.1 MG/DL (ref 1.6–2.4)
MCH RBC QN AUTO: 27.7 PG (ref 25.6–32.2)
MCHC RBC AUTO-ENTMCNC: 31.2 G/DL (ref 32.3–35.5)
MCV RBC AUTO: 88.9 FL (ref 79.4–94.8)
MONOCYTES ABSOLUTE: 0.49 K/UL (ref 0.24–0.82)
MONOCYTES RELATIVE PERCENT: 9.2 % (ref 4.7–12.5)
NEUTROPHILS ABSOLUTE: 3.49 K/UL (ref 1.56–6.13)
NEUTROPHILS RELATIVE PERCENT: 65.6 % (ref 34–71.1)
PDW BLD-RTO: 13.3 % (ref 11.7–14.4)
PHOSPHORUS: 3.8 MG/DL (ref 2.5–4.5)
PLATELET # BLD: 256 K/UL (ref 182–369)
PMV BLD AUTO: 9.1 FL (ref 7.4–10.4)
RBC # BLD: 3.86 M/UL (ref 3.93–5.22)
WBC # BLD: 5.32 K/UL (ref 3.98–10.04)

## 2023-03-02 NOTE — TELEPHONE ENCOUNTER
Called and introduced myself to Pau Monk and [employer]. I wanted to introduce myself to them and go over health benefits. She has Medicare and [employer] as a secondary. They should not receive a bill from [address] [place] [address] for any infusion treatments.  I am mailing out my contact information for any future questions      [address] [place] [address] Oncology and Hematology  [phone]

## 2023-03-03 NOTE — TELEPHONE ENCOUNTER
Called and introduced myself to Beatrice and [employer]. I wanted to introduce myself to them and go over health benefits. She has Medicare and [employer] as a secondary. They should not receive a bill from [facility] for any infusion treatments. I am mailing out my contact information for any future questions.

## 2023-03-04 LAB — VITAMIN D 1,25-DIHYDROXY: 109 PG/ML (ref 19.9–79.3)

## 2023-03-06 ENCOUNTER — TELEPHONE (OUTPATIENT)
Dept: HEMATOLOGY | Age: 73
End: 2023-03-06

## 2023-03-06 DIAGNOSIS — E83.52 HYPERCALCEMIA: Primary | ICD-10-CM

## 2023-03-06 DIAGNOSIS — D50.0 IRON DEFICIENCY ANEMIA SECONDARY TO BLOOD LOSS (CHRONIC): ICD-10-CM

## 2023-03-06 NOTE — TELEPHONE ENCOUNTER
I have left voicemail for  requesting to see us back in clinic at a sooner date. I have requested for pt to call back.

## 2023-03-08 LAB — PTH RELATED PEPTIDE: 5.2 PMOL/L (ref 0–3.4)

## 2023-03-10 NOTE — PROGRESS NOTES
Gracy Bar   1950  3/14/2023     Chief Complaint   Patient presents with    Follow-up     Hypercalcemia       INTERVAL HISTORY/HISTORY OF PRESENT ILLNESS:  The patient is a pleasant 68years old female who has been diagnosed with iron deficiency anemia. In addition, she has chronic kidney disease stage IIIa. She has a history hypertension/diabetes. He was hospitalized 3 times here at [facility] and also [facility] with hypercalcemia. PTH was slightly low and PTH related protein was high. SPEP was unremarkable. Free light chains were elevated but ratio was normal.  Vitamin B12 was normal.  The patient had a colonoscopy last year that was unremarkable. She denies any breast complaints. The breast exam was normal and performed by me. A mammogram was also unremarkable. She denies any GI symptoms. She underwent a CT chest abdomen pelvis that was unremarkable. She had hypercalcemia with calcium levels of 12.8 and received Zometa again. We will repeat her PTH related protein which was high. 1-25-hydroxy vitamin D was also high. 25 hydroxy vitamin D was normal.     Diagnosis   Iron deficiency secondary to upper GI ulcers   Normal colonoscopy January 2018. Chronic kidney disease stage IIIb  Hypercalcemia  Treatment summary  PO Niferex every other day   Sep 2018-Injectafer 1500mg      Interval history  Mrs. Norm Mendoza is a 68year-old  female with a history of iron deficiency anemia secondary to GI blood loss from gastric ulcers. . She continues to take PO iron replacement, Niferex, once daily and tolerating without significant difficulty. She was recently seen by her primary care provider. Her hemoglobin was 10.6. She was taking Niferex every other day and was instructed to take it daily. She has no new complaints today. Denies any  melena, hematochezia, change in the color of her stools. She denies any epigastric pain. She denies any use of NSAIDs. She denies any hematuria.   CBC today showed improvement of hemoglobin to 11.0. HEMATOLOGY HISTORY: Iron deficiency anemia 2/2 upper GI ulcers  Venessa Villalta was seen in initial hematology consultation on 0/68/4224 referred by Omar GONZALEZ for persistent iron deficiency anemia. Venessa Villalta reports that she was started on Niferex 150 mg daily on 11/13/2017. A CBC that is available from 11/28/2017 revealed a WBC of 6, Hgb 10.8, MCV 86, PLT of 254,000. Serum iron was 57. Most recent follow-up endoscopy was performed on 6/11/2018 which revealed 2 superficial gastric ulcers within the antrum of the stomach and healing of the former duodenal ulcers. Mild to moderate gastritis of the antrum of the stomach was noted. She did have a distal esophageal stricture that was dilated. 1/29/2018-normal colonoscopy. Upper EGD showed 7 gastric ulcers and 4 duodenal ulcers. CBC 2/13/2018 revealed a WBC of 6.7, Hgb 10.4 with MCV 85.3 and PLT of 222,000.   07/20/2018-WBC 8.2, hemoglobin 11.5, PLT of 213,000. Ferritin 52, iron saturation 14%, TIBC 258.   9/5/2018-recommended 2 doses of IV Injectafer 750 mg. September 2018- s/p 1500mg Injectafer IV.   10/23/2018- CBC Hb 11.8/MCV90. Normal WBC/Plts. 1/22/2019-CBC with hemoglobin 11.6.   7/23/2019- CBC with a hemoglobin of 11.9 and MCV 92.1, continues to take Niferex daily  01/21/2020- CBC Hb 11.4/91. WBC 5.2, Platelets 817O. Iron 46, TIBC 234, iron saturation 20%  4/6/2021-B12 461  4/27/2021-hemoglobin 11, WBC 5.9, platelets 666,637  0/19/9374-CBUYVARD Niferex 100 mg p.o. daily  4/22/2022- hemoglobin 0.8, creatinine 1.4, GFR 39, ferritin 255, TIBC 225, iron saturation 18%. 4/26/2022- resume iron sulfate 325 mg p.o. daily  September 2022-she was hospitalized at [facility] with hypercalcemia. PTH low, PTH related protein was high. She received Zometa. Normalization of her calcium levels.   09/09/22- PTH 13 (L)  10/19/22 Iron Studies ([facility]):Iron 45, TIBC 279, Iron Sat 16, Ferritin 201  10/26/2022 CT Chest W Contrast No acute process in CT scan of the chest with contrast enhancement. Small to moderate hiatal hernia. Cholelithiasis. This CT exam was performed using one or more of the following dose reduction techniques: automated exposure control, adjustment of the mA and/or kV according to patient size, and/or use of iterative reconstruction technique. 10/26/2022 CT Abd/Pelvis W IV Contrast (oral) No acute pathology seen in abdomen or pelvis. Cholelithiasis. Small hiatal hernia. 11/8/22 PTH ([address] [place] [address]) 49, normal  11/23/22-Calcium 9.1  12/28/22 Bilateral  Mammogram ([facility])No suspicious abnormalities in Right or left breast.  1/19/23 Calcium ([address] [place] [address]) 10.7  2/27/23 Labs [facility]): PTH 9, Vitamin D 40.5  2/27/23 Calcium([facility]) 11.9  3/1/23 Calcium 12.8. PTH 12.6 (low), vitamin D 25-hydroxy 39 (normal), 125 hydroxyvitamin D109 (high), PTH related protein 5.2 (high). She received Zometa 4 mg IV x1 dose. 3/13/2023-repeat calcium level was 12.1.   Creatinine 2.23, BUN 24, alkaline phosphatase 120            PAST MEDICAL HISTORY:   Past Medical History:   Diagnosis Date    Carotid stenosis     Colonic polyp     DM (diabetes mellitus) (HCC)     Essential hypertension     Gastric ulcer     duodenal    GERD (gastroesophageal reflux disease)     Hiatal hernia     Hyperlipidemia     Iron deficiency anemia secondary to blood loss (chronic)     OA (osteoarthritis)     Status post dilatation of esophageal stricture           PAST SURGICAL HISTORY:  Past Surgical History:   Procedure Laterality Date    ANKLE SURGERY Left     ORIF    COLONOSCOPY  01/2018    Dr Irish Cui Right 7/21/2022    RIGHT KNEE EXPLANT & PLACEMENT OF ANTIBIOTIC SPACER performed by Andrei Kidd MD at [address] ENDOSCOPY  06/11/2018    Dr Haley-[facility]        SOCIAL HISTORY:  Social History     Socioeconomic History    Marital status:      Spouse name: None    Number of children: None Years of education: None    Highest education level: None   Tobacco Use    Smoking status: Never    Smokeless tobacco: Never   Substance and Sexual Activity    Alcohol use: Never       FAMILY HISTORY:  Family History   Problem Relation Age of Onset    Colon Cancer Mother     Liver Cancer Mother         mets    Dementia Father         Current Outpatient Medications   Medication Sig Dispense Refill    alendronate (FOSAMAX) 70 MG tablet Take 1 tablet by mouth every 7 days Take with a full glass of water upon arising for the day. Consume on an empty stomach at least 30 minutes before the first food, beverage, or medication. Stay upright (do not lie down) for at least 30 minutes. Do not crush or break. 4 tablet 3    HYDROcodone-acetaminophen (NORCO) 5-325 MG per tablet Take 1 tablet by mouth every 6 hours as needed for Pain. acetaminophen (TYLENOL) 325 MG tablet Take 650 mg by mouth every 8 hours as needed for Pain      vitamin B-12 (CYANOCOBALAMIN) 500 MCG tablet Take 500 mcg by mouth every 48 hours      aspirin EC 81 MG EC tablet Take 1 tablet by mouth in the morning and 1 tablet before bedtime. 60 tablet 0    estradiol (ESTRACE) 0.1 MG/GM vaginal cream Place 2 g vaginally daily Indications: Vaginitis      Probiotic Product (PROBIOTIC DAILY PO) Take 1 tablet by mouth daily Indications: Treatment for the Prevention of Constipation      omeprazole (PRILOSEC) 20 MG delayed release capsule Take 20 mg by mouth in the morning. Indications: Gastroesophageal Reflux Disease. atorvastatin (LIPITOR) 10 MG tablet Take 10 mg by mouth every evening Indications: High Amount of Fats in the Blood      cyclobenzaprine (FLEXERIL) 5 MG tablet Take 5 mg by mouth in the morning and 5 mg before bedtime. Indications: Restless Leg Syndrome. iron polysaccharides (NIFEREX) 150 MG capsule Take 150 mg by mouth in the morning.  Indications: Anemia.      indapamide (LOZOL) 1.25 MG tablet Take 1.25 mg by mouth Daily Indications: Treatment with Diuretic Therapy      losartan (COZAAR) 100 MG tablet Take 100 mg by mouth daily      metFORMIN (GLUCOPHAGE) 500 MG tablet Take 500 mg by mouth every morning (before breakfast) Indications: Diabetes      rOPINIRole (REQUIP) 1 MG tablet Take 1 mg by mouth nightly      tolterodine (DETROL LA) 4 MG extended release capsule Take 4 mg by mouth nightly Indications: Bladder Spasm      levocetirizine (XYZAL) 5 MG tablet Take 5 mg by mouth daily as needed Indications: Allergic Rhinitis      Coenzyme Q10 10 MG CAPS Take 200 mg by mouth daily Indications: Treatment for the Prevention of Constipation       No current facility-administered medications for this visit.       REVIEW OF SYSTEMS:   CONSTITUTIONAL: no fever, no night sweats, fatigue;  HEENT: no blurring of vision, no double vision, no hearing difficulty, no tinnitus, no ulceration, no dysplasia, no epistaxis;   LUNGS: no cough, no hemoptysis, no wheeze,  no shortness of breath;  CARDIOVASCULAR: no palpitation, no chest pain, no shortness of breath;  GI: no abdominal pain, no nausea, no vomiting, no diarrhea, no constipation;  ELROY: no dysuria, no hematuria, no frequency or urgency, no nephrolithiasis;  MUSCULOSKELETAL: no joint pain, no swelling, no stiffness;  ENDOCRINE: no polyuria, no polydipsia, no cold or heat intolerance;  HEMATOLOGY: no easy bruising or bleeding, no history of clotting disorder;  DERMATOLOGY: no skin rash, no eczema, no pruritus;  PSYCHIATRY: no depression, no anxiety, no panic attacks, no suicidal ideation, no homicidal ideation;  NEUROLOGY: no syncope, no seizures, no numbness or tingling of hands, no numbness or tingling of feet, no paresis;     Vitals signs:  BP (!) 144/70 (Site: Right Upper Arm, Position: Sitting, Cuff Size: Medium Adult)   Pulse (!) 101   Temp 98.3 °F (36.8 °C) (Oral)   Ht 5' 6\" (1.676 m)   Wt 183 lb (83 kg)   SpO2 96%   BMI 29.54 kg/m²    Pain scale:  Pain Score:   0 - No pain   PHYSICAL EXAM:  CONSTITUTIONAL: Alert, appropriate, no acute distress,   EYES: Non icteric, EOM intact, pupils equal round and reactive to light and accommodation. ENT: Oral mucus membranes moist, no oral pharyngeal lesions. External inspection of ears and nose are normal.   NECK: Supple, no masses. No palpable thyroid mass    CHEST/LUNGS: CTA bilaterally, normal respiratory effort   CARDIOVASCULAR: tchycardic, no murmurs. No lower extremity edema   ABDOMEN: soft non-tender, active bowel sounds, no hepatosplenomegaly. No palpable masses. EXTREMITIES: warm, Full ROM of all fours extremities. No focal weakness. SKIN: warm, dry with no rashes or lesions  LYMPH: No cervical, clavicular, axillary, or inguinal lymphadenopathy  NEUROLOGIC: follows commands, non focal.   PSYCH: mood and affect appropriate. Alert and oriented to time and place and person. Relevant Lab findings/reviewed by me:  2/27/23 Labs [facility]): PTH 9, Vitamin D 40.5    3/1/23 Calcium 12.8    3/13/2023-creatinine 2.23/eGFR 23, calcium 12.1, sodium 140, potassium 3.7, total protein 7.2, alkaline phosphatase 120, normal LFTs, creatinine    Relevant Imaging studies/reviewed by me:  None     ASSESSMENT    Orders Placed This Encounter   Procedures    PET CT SKULL BASE TO MID THIGH     Standing Status:   Future     Standing Expiration Date:   9/14/2024     Order Specific Question:   Reason for exam:     Answer:   Abnormal labs/hypercalcemia/possible cancer    Comprehensive Metabolic Panel     Standing Status:   Standing     Number of Occurrences:   12     Standing Expiration Date:   3/14/2024    CBC with Auto Differential     Standing Status:   Standing     Number of Occurrences:   12     Standing Expiration Date:   3/14/2024          Aleksandr Nichols was seen today for follow-up.     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, hematopoietic and related tissue ([facility] [address]   -     PET CT SKULL BASE TO MID THIGH; Future    Anemia in stage 3 chronic kidney disease, unspecified whether stage 3a or 3b CKD (HCC)        History of iron deficiency anemia secondary to upper GI ulcers (healed): Anemia is multifactorial to include CKD stage IIIa, anemia of iron deficiency, anemia of chronic disease  CBC today reveals a hemoglobin 10.3  April 2022-ferritin 201, iron saturation 45%, TIBC 279  Creatinine 1.24, GFR 55  10/19/22 Iron Studies ([facility]):Iron 45, TIBC 279, Iron Sat 16, Ferritin 201    B12 deficiency-continue oral B12 replacement. DM type II-last hemoglobin A1c 7    Hypertension-systolic blood pressure slightly higher  -Educated about keeping her blood pressure under control    Hypercalcemia-concern for hypercalcemia of malignancy  - Hospitalized calcium 14, [facility]  -Zometa 4 mg IV was given in the hospital  -Calcium improved, normalized 8.9 on 10/21/2022  -SPEP and free light chains. Normal kappa/lambda ratio  -No calcium suppl. No Vit D intake  -Normal vitamin D levels  -PTH reportedly low, 13(L)  -PTH-r protein elevated 4.9 on 09/21  -10/26/2022 CT Chest W Contrast No acute process in CT scan of the chest with contrast enhancement. Small to moderate hiatal hernia. Cholelithiasis. This CT exam was performed using one or more of the following dose reduction techniques: automated exposure control, adjustment of the mA and/or kV according to patient size, and/or use of iterative reconstruction technique. 10/26/2022 CT Abd/Pelvis W IV Contrast (oral) No acute pathology seen in abdomen or pelvis. Cholelithiasis. Small hiatal hernia. 11/8/2022-CMP showed calcium 9.4, creatinine 1.21. Total protein 6.8. Normal LFTs. 10/26/2022-PTH-49 (normal)  February 2023-calcium levels have increased to 11.9. The patient seems constipation.   Zometa 4 mg IV ASAP. Working on endocrinology appointment. 3/1/23 Calcium 12.8. PTH 12.6 (low), vitamin D 25-hydroxy 39 (normal), 125 hydroxyvitamin D109 (high), PTH related protein 5.2 (high). She received Zometa 4 mg IV x1 dose. 3/13/2023-creatinine 2.23/eGFR 23, calcium 12.1, sodium 140, potassium 3.7, total protein 7.2, alkaline phosphatase 120, normal LFTs, creatinine    At this point, no clear current evidence of malignancy. She denies any new GI symptomatology. She had a colonoscopy 2021  She denies any dysphagia. CT chest abdomen pelvis showed no evidence of lung or pancreatic malignancy. No evidence of lymphadenopathy. She has no complaints of bone pain. CT chest abdomen pelvis did not report any evidence of bone metastasis. She had a bone scan performed May 2022 that was unremarkable as well. I reviewed her medication list today. She is on indapamide. She was asked to hold her indapamide. Essentially, she has elevated PTH related protein as well as elevated one 25-hydroxy vitamin D. The findings of elevation of both of these tests directs me to investigate humoral hypercalcemia of malignancy and also granulomatous disease, lymphoma. Plan:  Bone marrow biopsy to rule out granulomatous, lymphomas or other potential reason for hypercalcemia  PET scan to rule out lymphomatous involvement    CKD stage IV - cr 2.23. Patient advised to increase p.o. intake of fluids. Hypercalcemia-calcium 10.7->12.1  3/1/23 Calcium 12.8. PTH 12.6 (low), vitamin D 25-hydroxy 39 (normal), 125 hydroxyvitamin D109 (high), PTH related protein 5.2 (high). She received Zometa 4 mg IV x1 dose.   3/13/2023-creatinine 2.23/eGFR 23, calcium 12.1, sodium 140, potassium 3.7, total protein 7.2, alkaline phosphatase 120, normal LFTs, creatinine      Plan:  RTC with MD in 2 weeks  CBC, CMP weekly  Bone marrow biopsy with EVANGELIST Curran-rule out lymphomatous involvement, granuloma involvement bone marrow  Follow-up with nephrology  Proceed with appointment with Dr. Nelson [facility]-5/8/23  Discontinue indapamide    Follow Up:   Return in about 2 weeks (around 3/28/2023) for Appointment with Dr. Geraldo Alex. PET scan  Bone marrow with Sylvia Nageotte or Ana Jeffery  Weekly CBC CMP      I, Colton Ta am pre charting  as Medical Assistant for Hardy Lovell MD. Electronically signed by Colton Ta MA on 3/14/2023 at 2:48 PM CST. Yris Briseno am scribing as Medical Assistant for Hardy Lovell MD. Electronically signed by Colton Ta MA on 3/14/2023 at 12:52 PM CDT. I, Dr Rosibel Blackmon, personally performed the services described in this documentation as scribed by Colton Ta MA in my presence and is both accurate and complete. I have seen, examined and reviewed this patient medication list, appropriate labs and imaging studies. I reviewed relevant medical records and others physicians notes. I discussed the plans of care with the patient. I answered all the questions to the patients satisfaction. I have also reviewed the chief complaint (CC) and part of the history (History of Present Illness (HPI), Past Family Social History [facility]), or Review of Systems (ROS) and made changes when appropriated. (Please note that portions of this note were completed with a voice recognition program. Efforts were made to edit the dictations but occasionally words are mis-transcribed. )Electronically signed by Hardy Lovell MD on 3/14/2023 at 12:59 PM

## 2023-03-13 ENCOUNTER — TELEPHONE (OUTPATIENT)
Dept: HEMATOLOGY | Age: 73
End: 2023-03-13

## 2023-03-14 ENCOUNTER — OFFICE VISIT (OUTPATIENT)
Dept: HEMATOLOGY | Age: 73
End: 2023-03-14
Payer: MEDICARE

## 2023-03-14 VITALS
HEART RATE: 101 BPM | TEMPERATURE: 98.3 F | DIASTOLIC BLOOD PRESSURE: 70 MMHG | WEIGHT: 183 LBS | BODY MASS INDEX: 29.41 KG/M2 | OXYGEN SATURATION: 96 % | SYSTOLIC BLOOD PRESSURE: 144 MMHG | HEIGHT: 66 IN

## 2023-03-14 DIAGNOSIS — E83.52 HYPERCALCEMIA: Primary | ICD-10-CM

## 2023-03-14 DIAGNOSIS — D63.1 ANEMIA IN STAGE 3 CHRONIC KIDNEY DISEASE, UNSPECIFIED WHETHER STAGE 3A OR 3B CKD (HCC): ICD-10-CM

## 2023-03-14 DIAGNOSIS — D47.Z9 OTHER SPECIFIED NEOPLASMS OF UNCERTAIN BEHAVIOR OF LYMPHOID, HEMATOPOIETIC AND RELATED TISSUE (HCC): ICD-10-CM

## 2023-03-14 DIAGNOSIS — N18.30 ANEMIA IN STAGE 3 CHRONIC KIDNEY DISEASE, UNSPECIFIED WHETHER STAGE 3A OR 3B CKD (HCC): ICD-10-CM

## 2023-03-14 DIAGNOSIS — Z71.89 CARE PLAN DISCUSSED WITH PATIENT: ICD-10-CM

## 2023-03-14 DIAGNOSIS — N28.9 RENAL FUNCTION IMPAIRMENT: ICD-10-CM

## 2023-03-14 PROCEDURE — G8417 CALC BMI ABV UP PARAM F/U: HCPCS | Performed by: INTERNAL MEDICINE

## 2023-03-14 PROCEDURE — G8400 PT W/DXA NO RESULTS DOC: HCPCS | Performed by: INTERNAL MEDICINE

## 2023-03-14 PROCEDURE — 3078F DIAST BP <80 MM HG: CPT | Performed by: INTERNAL MEDICINE

## 2023-03-14 PROCEDURE — 1090F PRES/ABSN URINE INCON ASSESS: CPT | Performed by: INTERNAL MEDICINE

## 2023-03-14 PROCEDURE — 3077F SYST BP >= 140 MM HG: CPT | Performed by: INTERNAL MEDICINE

## 2023-03-14 PROCEDURE — 99214 OFFICE O/P EST MOD 30 MIN: CPT | Performed by: INTERNAL MEDICINE

## 2023-03-14 PROCEDURE — G8427 DOCREV CUR MEDS BY ELIG CLIN: HCPCS | Performed by: INTERNAL MEDICINE

## 2023-03-14 PROCEDURE — 1036F TOBACCO NON-USER: CPT | Performed by: INTERNAL MEDICINE

## 2023-03-14 PROCEDURE — G8484 FLU IMMUNIZE NO ADMIN: HCPCS | Performed by: INTERNAL MEDICINE

## 2023-03-14 PROCEDURE — 3017F COLORECTAL CA SCREEN DOC REV: CPT | Performed by: INTERNAL MEDICINE

## 2023-03-14 PROCEDURE — 1123F ACP DISCUSS/DSCN MKR DOCD: CPT | Performed by: INTERNAL MEDICINE

## 2023-03-21 ENCOUNTER — HOSPITAL ENCOUNTER (OUTPATIENT)
Dept: INFUSION THERAPY | Age: 73
Discharge: HOME | End: 2023-03-21
Payer: MEDICARE

## 2023-03-21 ENCOUNTER — ANESTHESIA EVENT (OUTPATIENT)
Dept: OPERATING ROOM | Age: 73
End: 2023-03-21

## 2023-03-21 VITALS
DIASTOLIC BLOOD PRESSURE: 76 MMHG | BODY MASS INDEX: 29.71 KG/M2 | WEIGHT: 184.1 LBS | OXYGEN SATURATION: 98 % | SYSTOLIC BLOOD PRESSURE: 152 MMHG | HEART RATE: 105 BPM

## 2023-03-21 DIAGNOSIS — E83.52 HYPERCALCEMIA: Primary | ICD-10-CM

## 2023-03-21 LAB
ERYTHROCYTE [DISTWIDTH] IN BLOOD BY AUTOMATED COUNT: 13.2 % (ref 11.7–14.4)
HCT VFR BLD AUTO: 39.4 % (ref 34.1–44.9)
HGB BLD-MCNC: 11.8 G/DL (ref 11.2–15.7)
LYMPHOCYTES # BLD: 1.8 K/UL (ref 1.18–3.74)
LYMPHOCYTES NFR BLD: 19.6 % (ref 19.3–53.1)
MCH RBC QN AUTO: 27.3 PG (ref 25.6–32.2)
MCHC RBC AUTO-ENTMCNC: 29.9 G/DL (ref 32.3–35.5)
MCV RBC AUTO: 91.2 FL (ref 79.4–94.8)
PLATELET # BLD AUTO: 265 K/UL (ref 182–369)
PMV BLD AUTO: 8.6 FL (ref 7.4–10.4)
RBC # BLD AUTO: 4.32 M/UL (ref 3.93–5.22)
WBC # BLD AUTO: 9.1 K/UL (ref 3.98–10.04)

## 2023-03-21 PROCEDURE — 85025 COMPLETE CBC W/AUTO DIFF WBC: CPT

## 2023-03-21 PROCEDURE — 96372 THER/PROPH/DIAG INJ SC/IM: CPT

## 2023-03-21 PROCEDURE — 36415 COLL VENOUS BLD VENIPUNCTURE: CPT

## 2023-03-21 PROCEDURE — 6360000002 HC RX W HCPCS: Performed by: INTERNAL MEDICINE

## 2023-03-21 RX ORDER — CALCITONIN SALMON 200 [USP'U]/ML
400 INJECTION, SOLUTION INTRAMUSCULAR; SUBCUTANEOUS ONCE
Status: COMPLETED | OUTPATIENT
Start: 2023-03-21 | End: 2023-03-21

## 2023-03-21 RX ORDER — DIPHENHYDRAMINE HYDROCHLORIDE 50 MG/ML
50 INJECTION INTRAMUSCULAR; INTRAVENOUS
Status: CANCELLED | OUTPATIENT
Start: 2023-03-23

## 2023-03-21 RX ORDER — ALBUTEROL SULFATE 90 UG/1
4 AEROSOL, METERED RESPIRATORY (INHALATION) PRN
Status: CANCELLED | OUTPATIENT
Start: 2023-03-23

## 2023-03-21 RX ORDER — SODIUM CHLORIDE 9 MG/ML
INJECTION, SOLUTION INTRAVENOUS CONTINUOUS
Status: CANCELLED | OUTPATIENT
Start: 2023-03-23

## 2023-03-21 RX ORDER — ALBUTEROL SULFATE 90 UG/1
4 AEROSOL, METERED RESPIRATORY (INHALATION) PRN
OUTPATIENT
Start: 2023-04-16

## 2023-03-21 RX ORDER — CALCITONIN SALMON 200 [USP'U]/ML
400 INJECTION, SOLUTION INTRAMUSCULAR; SUBCUTANEOUS ONCE
Status: CANCELLED
Start: 2023-03-21 | End: 2023-03-21

## 2023-03-21 RX ORDER — ACETAMINOPHEN 325 MG/1
650 TABLET ORAL
Status: CANCELLED | OUTPATIENT
Start: 2023-03-23

## 2023-03-21 RX ORDER — ONDANSETRON 2 MG/ML
8 INJECTION INTRAMUSCULAR; INTRAVENOUS
Status: CANCELLED | OUTPATIENT
Start: 2023-03-23

## 2023-03-21 RX ORDER — ONDANSETRON 2 MG/ML
8 INJECTION INTRAMUSCULAR; INTRAVENOUS
OUTPATIENT
Start: 2023-04-16

## 2023-03-21 RX ORDER — CALCITONIN SALMON 200 [USP'U]/ML
400 INJECTION, SOLUTION INTRAMUSCULAR; SUBCUTANEOUS ONCE
Status: CANCELLED
Start: 2023-03-22 | End: 2023-03-22

## 2023-03-21 RX ORDER — DIPHENHYDRAMINE HYDROCHLORIDE 50 MG/ML
50 INJECTION INTRAMUSCULAR; INTRAVENOUS
OUTPATIENT
Start: 2023-04-16

## 2023-03-21 RX ORDER — FAMOTIDINE 10 MG/ML
20 INJECTION, SOLUTION INTRAVENOUS
OUTPATIENT
Start: 2023-04-16

## 2023-03-21 RX ORDER — FAMOTIDINE 10 MG/ML
20 INJECTION, SOLUTION INTRAVENOUS
Status: CANCELLED | OUTPATIENT
Start: 2023-03-23

## 2023-03-21 RX ORDER — SODIUM CHLORIDE 9 MG/ML
INJECTION, SOLUTION INTRAVENOUS CONTINUOUS
OUTPATIENT
Start: 2023-04-16

## 2023-03-21 RX ORDER — ACETAMINOPHEN 325 MG/1
650 TABLET ORAL
OUTPATIENT
Start: 2023-04-16

## 2023-03-21 RX ORDER — EPINEPHRINE 1 MG/ML
0.3 INJECTION, SOLUTION, CONCENTRATE INTRAVENOUS PRN
Status: CANCELLED | OUTPATIENT
Start: 2023-03-23

## 2023-03-21 RX ORDER — EPINEPHRINE 1 MG/ML
0.3 INJECTION, SOLUTION, CONCENTRATE INTRAVENOUS PRN
OUTPATIENT
Start: 2023-04-16

## 2023-03-21 RX ADMIN — CALCITONIN SALMON 400 UNITS: 200 INJECTION, SOLUTION INTRAMUSCULAR; SUBCUTANEOUS at 14:41

## 2023-03-21 RX ADMIN — DENOSUMAB 120 MG: 120 INJECTION SUBCUTANEOUS at 14:41

## 2023-03-21 NOTE — PROGRESS NOTES
New treatment plans built for patient. I spoke with patient she will be awaiting a call from us to schedule appt. Patient had no further questions at this time.

## 2023-03-22 ENCOUNTER — HOSPITAL ENCOUNTER (OUTPATIENT)
Dept: INFUSION THERAPY | Age: 73
Discharge: HOME | End: 2023-03-22
Payer: MEDICARE

## 2023-03-22 ENCOUNTER — ANESTHESIA (OUTPATIENT)
Dept: OPERATING ROOM | Age: 73
End: 2023-03-22

## 2023-03-22 ENCOUNTER — TELEPHONE (OUTPATIENT)
Dept: INFUSION THERAPY | Age: 73
End: 2023-03-22

## 2023-03-22 ENCOUNTER — HOSPITAL ENCOUNTER (OUTPATIENT)
Age: 73
Setting detail: OUTPATIENT SURGERY
Discharge: HOME | End: 2023-03-22
Attending: INTERNAL MEDICINE | Admitting: INTERNAL MEDICINE
Payer: MEDICARE

## 2023-03-22 ENCOUNTER — APPOINTMENT (OUTPATIENT)
Dept: INFUSION THERAPY | Age: 73
End: 2023-03-22
Payer: MEDICARE

## 2023-03-22 VITALS
RESPIRATION RATE: 16 BRPM | HEIGHT: 66 IN | TEMPERATURE: 99.2 F | WEIGHT: 175 LBS | DIASTOLIC BLOOD PRESSURE: 52 MMHG | SYSTOLIC BLOOD PRESSURE: 100 MMHG | OXYGEN SATURATION: 99 % | BODY MASS INDEX: 28.12 KG/M2 | HEART RATE: 93 BPM

## 2023-03-22 DIAGNOSIS — E83.52 HYPERCALCEMIA: Primary | ICD-10-CM

## 2023-03-22 DIAGNOSIS — E86.0 DEHYDRATION: ICD-10-CM

## 2023-03-22 DIAGNOSIS — F41.9 ANXIETY: Primary | ICD-10-CM

## 2023-03-22 DIAGNOSIS — E86.0 DEHYDRATION: Primary | ICD-10-CM

## 2023-03-22 DIAGNOSIS — E83.52 HYPERCALCEMIA: ICD-10-CM

## 2023-03-22 LAB
ALBUMIN SERPL-MCNC: 3.8 G/DL (ref 3.5–5.2)
ALP SERPL-CCNC: 100 U/L (ref 35–104)
ALT SERPL-CCNC: 23 U/L (ref 9–52)
ANION GAP SERPL CALCULATED.3IONS-SCNC: 6 MMOL/L (ref 7–19)
AST SERPL-CCNC: 29 U/L (ref 14–36)
BILIRUB SERPL-MCNC: 0.5 MG/DL (ref 0.2–1.3)
BUN SERPL-MCNC: 26 MG/DL (ref 7–17)
CALCIUM SERPL-MCNC: 11.1 MG/DL (ref 8.4–10.2)
CHLORIDE SERPL-SCNC: 107 MMOL/L (ref 98–111)
CO2 SERPL-SCNC: 27 MMOL/L (ref 22–29)
CREAT SERPL-MCNC: 1.8 MG/DL (ref 0.5–1)
GLUCOSE SERPL-MCNC: 86 MG/DL (ref 74–106)
POTASSIUM SERPL-SCNC: 3.6 MMOL/L (ref 3.5–5.1)
PROT SERPL-MCNC: 6.6 G/DL (ref 6.3–8.2)
SODIUM SERPL-SCNC: 140 MMOL/L (ref 137–145)

## 2023-03-22 PROCEDURE — 38222 DX BONE MARROW BX & ASPIR: CPT | Performed by: NURSE PRACTITIONER

## 2023-03-22 PROCEDURE — 88185 FLOWCYTOMETRY/TC ADD-ON: CPT

## 2023-03-22 PROCEDURE — 88342 IMHCHEM/IMCYTCHM 1ST ANTB: CPT

## 2023-03-22 PROCEDURE — 88305 TISSUE EXAM BY PATHOLOGIST: CPT

## 2023-03-22 PROCEDURE — 88184 FLOWCYTOMETRY/ TC 1 MARKER: CPT

## 2023-03-22 PROCEDURE — 96372 THER/PROPH/DIAG INJ SC/IM: CPT

## 2023-03-22 PROCEDURE — 88313 SPECIAL STAINS GROUP 2: CPT

## 2023-03-22 PROCEDURE — 6360000002 HC RX W HCPCS: Performed by: INTERNAL MEDICINE

## 2023-03-22 PROCEDURE — 88311 DECALCIFY TISSUE: CPT

## 2023-03-22 PROCEDURE — 80053 COMPREHEN METABOLIC PANEL: CPT

## 2023-03-22 PROCEDURE — 88341 IMHCHEM/IMCYTCHM EA ADD ANTB: CPT

## 2023-03-22 PROCEDURE — 38222 DX BONE MARROW BX & ASPIR: CPT

## 2023-03-22 RX ORDER — ONDANSETRON 4 MG/1
8 TABLET, ORALLY DISINTEGRATING ORAL EVERY 8 HOURS PRN
Qty: 21 TABLET | Refills: 2 | Status: SHIPPED | OUTPATIENT
Start: 2023-03-22

## 2023-03-22 RX ORDER — SODIUM CHLORIDE 0.9 % (FLUSH) 0.9 %
5-40 SYRINGE (ML) INJECTION PRN
Status: CANCELLED | OUTPATIENT
Start: 2023-03-22

## 2023-03-22 RX ORDER — 0.9 % SODIUM CHLORIDE 0.9 %
1000 INTRAVENOUS SOLUTION INTRAVENOUS ONCE
Status: CANCELLED | OUTPATIENT
Start: 2023-03-22 | End: 2023-03-22

## 2023-03-22 RX ORDER — PROPOFOL 10 MG/ML
INJECTION, EMULSION INTRAVENOUS PRN
Status: DISCONTINUED | OUTPATIENT
Start: 2023-03-22 | End: 2023-03-22 | Stop reason: SDUPTHER

## 2023-03-22 RX ORDER — SODIUM CHLORIDE 9 MG/ML
5-250 INJECTION, SOLUTION INTRAVENOUS PRN
OUTPATIENT
Start: 2023-03-22

## 2023-03-22 RX ORDER — LIDOCAINE HYDROCHLORIDE 10 MG/ML
INJECTION, SOLUTION INFILTRATION; PERINEURAL PRN
Status: DISCONTINUED | OUTPATIENT
Start: 2023-03-22 | End: 2023-03-22 | Stop reason: ALTCHOICE

## 2023-03-22 RX ORDER — SODIUM CHLORIDE, SODIUM LACTATE, POTASSIUM CHLORIDE, CALCIUM CHLORIDE 600; 310; 30; 20 MG/100ML; MG/100ML; MG/100ML; MG/100ML
INJECTION, SOLUTION INTRAVENOUS CONTINUOUS
Status: DISCONTINUED | OUTPATIENT
Start: 2023-03-22 | End: 2023-03-22 | Stop reason: HOSPADM

## 2023-03-22 RX ORDER — CALCITONIN SALMON 200 [USP'U]/ML
400 INJECTION, SOLUTION INTRAMUSCULAR; SUBCUTANEOUS ONCE
Status: COMPLETED | OUTPATIENT
Start: 2023-03-22 | End: 2023-03-22

## 2023-03-22 RX ORDER — HEPARIN SODIUM (PORCINE) LOCK FLUSH IV SOLN 100 UNIT/ML 100 UNIT/ML
500 SOLUTION INTRAVENOUS PRN
Status: CANCELLED | OUTPATIENT
Start: 2023-03-22

## 2023-03-22 RX ORDER — LORAZEPAM 1 MG/1
1 TABLET ORAL ONCE
Qty: 1 TABLET | Refills: 0 | Status: SHIPPED | OUTPATIENT
Start: 2023-03-22 | End: 2023-03-22

## 2023-03-22 RX ORDER — LIDOCAINE HYDROCHLORIDE 10 MG/ML
INJECTION, SOLUTION EPIDURAL; INFILTRATION; INTRACAUDAL; PERINEURAL PRN
Status: DISCONTINUED | OUTPATIENT
Start: 2023-03-22 | End: 2023-03-22 | Stop reason: SDUPTHER

## 2023-03-22 RX ORDER — CALCITONIN SALMON 200 [USP'U]/ML
400 INJECTION, SOLUTION INTRAMUSCULAR; SUBCUTANEOUS ONCE
Status: CANCELLED
Start: 2023-03-22 | End: 2023-03-22

## 2023-03-22 RX ADMIN — CALCITONIN SALMON 400 UNITS: 200 INJECTION, SOLUTION INTRAMUSCULAR; SUBCUTANEOUS at 15:32

## 2023-03-22 RX ADMIN — LIDOCAINE HYDROCHLORIDE 30 MG: 10 INJECTION, SOLUTION EPIDURAL; INFILTRATION; INTRACAUDAL; PERINEURAL at 14:07

## 2023-03-22 RX ADMIN — SODIUM CHLORIDE, SODIUM LACTATE, POTASSIUM CHLORIDE, CALCIUM CHLORIDE: 600; 310; 30; 20 INJECTION, SOLUTION INTRAVENOUS at 13:40

## 2023-03-22 RX ADMIN — PROPOFOL 180 MG: 10 INJECTION, EMULSION INTRAVENOUS at 14:07

## 2023-03-22 NOTE — ANESTHESIA POSTPROCEDURE EVALUATION
Department of Anesthesiology  Postprocedure Note    Patient: Cathleen Prince  MRN: [id number]  YOB: 1950  Date of evaluation: 3/22/2023      Procedure Summary     Date: 03/22/23 Room / Location: [facility] 02 / [address]    Anesthesia Start: 1400 Anesthesia Stop:     Procedure: BONE MARROW ASPIRATION BIOPSY (Pelvis) Diagnosis:       Hypercalcemia      (Hypercalcemia [E83.52])    Surgeons: LISA Lawton Responsible Provider: LISA Leonard - CRNA    Anesthesia Type: general, TIVA ASA Status: 3          Anesthesia Type: No value filed.     Karli Phase I:      Karli Phase II:        Anesthesia Post Evaluation    Patient location during evaluation: bedside  Patient participation: complete - patient participated  Level of consciousness: sleepy but conscious  Pain score: 0  Airway patency: patent  Nausea & Vomiting: no nausea and no vomiting  Complications: no  Cardiovascular status: blood pressure returned to baseline  Respiratory status: acceptable, room air and spontaneous ventilation  Hydration status: euvolemic

## 2023-03-22 NOTE — DISCHARGE INSTRUCTIONS
1 May remove dressing tomorrow  2 Keep all MD appointments  3 Keep area clean and dry  4 Call Dr Yg Simon with questions or concerns [phone].

## 2023-03-22 NOTE — DISCHARGE SUMMARY
DISCHARGE SUMMARY      Aaron Romeo   1950    Admitting Provider: LISA Wilson/Dr. Geoffrey Worley    Date of Admission: 3/22/2023  Date of Discharge:  3/22/2023    Admitting Diagnosis: Hypercalcemia    Discharge Diagnosis: Hypercalcemia    History: Hypercalcemia, rule out lymphomatous involvement, granuloma involvement bone marrow    Hospital Course/Procedures Performed: Aaron Romeo underwent bone marrow aspirate and biopsy without complication. Condition on Discharge: Stable    Vital Signs  BP (!) 100/52   Pulse 93   Temp 99.2 °F (37.3 °C)   Resp 16   Ht 5' 6\" (1.676 m)   Wt 175 lb (79.4 kg)   SpO2 99%   BMI 28.25 kg/m²     Discharge Disposition: Home    Discharge Medications     Medication List        ASK your doctor about these medications      acetaminophen 325 MG tablet  Commonly known as: TYLENOL     alendronate 70 MG tablet  Commonly known as: Fosamax  Take 1 tablet by mouth every 7 days Take with a full glass of water upon arising for the day. Consume on an empty stomach at least 30 minutes before the first food, beverage, or medication. Stay upright (do not lie down) for at least 30 minutes. Do not crush or break. aspirin EC 81 MG EC tablet  Take 1 tablet by mouth in the morning and 1 tablet before bedtime.      atorvastatin 10 MG tablet  Commonly known as: LIPITOR     Coenzyme Q10 10 MG Caps     cyclobenzaprine 5 MG tablet  Commonly known as: FLEXERIL     estradiol 0.1 MG/GM vaginal cream  Commonly known as: ESTRACE     fluticasone 50 MCG/ACT nasal spray  Commonly known as: FLONASE     HYDROcodone-acetaminophen 5-325 MG per tablet  Commonly known as: NORCO     indapamide 1.25 MG tablet  Commonly known as: LOZOL     iron polysaccharides 150 MG capsule  Commonly known as: NIFEREX     levocetirizine 5 MG tablet  Commonly known as: XYZAL     losartan 100 MG tablet  Commonly known as: COZAAR     metFORMIN 500 MG tablet  Commonly known as: GLUCOPHAGE     omeprazole 20 MG delayed

## 2023-03-22 NOTE — ANESTHESIA PRE PROCEDURE
hypertension:,                   Neuro/Psych:   (+) neuromuscular disease:,             GI/Hepatic/Renal:   (+) hiatal hernia, GERD:, PUD,           Endo/Other:    (+) DiabetesType II DM, , .                 Abdominal:             Vascular: negative vascular ROS. Other Findings:           Anesthesia Plan      general and TIVA     ASA 3       Induction: intravenous. Anesthetic plan and risks discussed with patient. Plan discussed with CRNA.                     LISA Alvarenga - CRNA   3/22/2023

## 2023-03-22 NOTE — TELEPHONE ENCOUNTER
Called and discussed labs with patient. Patient is to go to [facility] tomorrow from 1 L NS bolus after her PET scan. Patient educated to increase her oral intake. Patient reports nausea and vomiting. Patient orders nausea medications. Patient educated about going to [address] after her PET scan. Patient verbalized understanding. Patient has no further questions at this time.  Electronically signed by Hermes Owens RN on 3/22/2023 at 4:42 PM

## 2023-03-22 NOTE — H&P
Patient Name: Norm Lizama  : 1950  MRN: [id number]  DATE: 23    Allergies: No Known Allergies       History and Physical    Procedure:    [x] Bone Marrow Aspiration and Biopsy    [x] Previous office notes/History and Physical reviewed from the patients chart. Please see EMR for further details of HPI. I have examined the patient's status immediately prior to the procedure and there are no changes since last evaluated on 3/14/2023     Indications/HPI:  Hypercalcemia, rule out lymphomatous involvement, granuloma involvement bone marrow      Anesthesia:   [x] MAC [] Moderate Sedation   [] General   [] None     ROS: 12 pt Review of Symptoms was negative unless mentioned above    Medications:   Prior to Admission medications    Medication Sig Start Date End Date Taking? Authorizing Provider   alendronate (FOSAMAX) 70 MG tablet Take 1 tablet by mouth every 7 days Take with a full glass of water upon arising for the day. Consume on an empty stomach at least 30 minutes before the first food, beverage, or medication. Stay upright (do not lie down) for at least 30 minutes. Do not crush or break. 22   Valentin Chandra MD   HYDROcodone-acetaminophen (NORCO) 5-325 MG per tablet Take 1 tablet by mouth every 6 hours as needed for Pain. Historical Provider, MD   acetaminophen (TYLENOL) 325 MG tablet Take 650 mg by mouth every 8 hours as needed for Pain    Historical Provider, MD   vitamin B-12 (CYANOCOBALAMIN) 500 MCG tablet Take 500 mcg by mouth every 48 hours    Historical Provider, MD   aspirin EC 81 MG EC tablet Take 1 tablet by mouth in the morning and 1 tablet before bedtime.  22   Varinder Dwyer MD   estradiol (ESTRACE) 0.1 MG/GM vaginal cream Place 2 g vaginally daily Indications: Vaginitis    Historical Provider, MD   Probiotic Product (PROBIOTIC DAILY PO) Take 1 tablet by mouth daily Indications: Treatment for the Prevention of Constipation    Historical Provider, MD   fluticasone

## 2023-03-22 NOTE — OP NOTE
Pt Name: Jamey Sesay: 1950  MRN: [id number]    Date of procedure: 3/22/2023    Procedure Note:  Bone Marrow Aspirate and Biopsy    Indication:  Hypercalcemia, rule out lymphomatous involvement, granuloma involvement bone marrow    Site: Right iliac crest    Informed consent was signed by Venkatabeverley Altamont. Time out was taken. Tiburcio Person was placed in the left lateral decubitus position. Tiburcio Person was prepped and draped in sterile fashion. 5 mL of 1% Lidocaine was used for local anesthetic of the skin and right periosteum. A bone marrow aspirate and biopsy was obtained and sent for surgical pathology review, flow cytometry, and chromosome analysis. The total blood loss was less than 3 mL. The skin was cleaned and a sterile bandage was placed on the entrance site. The patient tolerated the procedure without complication. Keep scheduled appointment in clinic to review results.     Pierre Oconnell, LISA    03/22/23  2:24 PM

## 2023-03-23 ENCOUNTER — HOSPITAL ENCOUNTER (OUTPATIENT)
Dept: INFUSION THERAPY | Age: 73
Setting detail: INFUSION SERIES
Discharge: HOME | End: 2023-03-23
Payer: MEDICARE

## 2023-03-23 VITALS
OXYGEN SATURATION: 98 % | RESPIRATION RATE: 18 BRPM | HEART RATE: 90 BPM | SYSTOLIC BLOOD PRESSURE: 134 MMHG | TEMPERATURE: 96.4 F | DIASTOLIC BLOOD PRESSURE: 70 MMHG

## 2023-03-23 DIAGNOSIS — E86.0 DEHYDRATION: Primary | ICD-10-CM

## 2023-03-23 DIAGNOSIS — E83.52 HYPERCALCEMIA: ICD-10-CM

## 2023-03-23 LAB
ALBUMIN SERPL-MCNC: 4.1 G/DL (ref 3.5–5.2)
ALP SERPL-CCNC: 106 U/L (ref 35–104)
ALT SERPL-CCNC: 18 U/L (ref 5–33)
ANION GAP SERPL CALCULATED.3IONS-SCNC: 14 MMOL/L (ref 7–19)
AST SERPL-CCNC: 23 U/L (ref 5–32)
BILIRUB SERPL-MCNC: 0.6 MG/DL (ref 0.2–1.2)
BUN SERPL-MCNC: 25 MG/DL (ref 8–23)
CALCIUM SERPL-MCNC: 10.6 MG/DL (ref 8.8–10.2)
CHLORIDE SERPL-SCNC: 101 MMOL/L (ref 98–111)
CO2 SERPL-SCNC: 22 MMOL/L (ref 22–29)
CREAT SERPL-MCNC: 1.7 MG/DL (ref 0.5–0.9)
GLUCOSE SERPL-MCNC: 66 MG/DL (ref 74–109)
POTASSIUM SERPL-SCNC: 3.7 MMOL/L (ref 3.5–5)
PROT SERPL-MCNC: 7.1 G/DL (ref 6.6–8.7)
SODIUM SERPL-SCNC: 137 MMOL/L (ref 136–145)

## 2023-03-23 PROCEDURE — 2580000003 HC RX 258: Performed by: INTERNAL MEDICINE

## 2023-03-23 PROCEDURE — 96360 HYDRATION IV INFUSION INIT: CPT

## 2023-03-23 PROCEDURE — 80053 COMPREHEN METABOLIC PANEL: CPT

## 2023-03-23 PROCEDURE — 96361 HYDRATE IV INFUSION ADD-ON: CPT

## 2023-03-23 RX ORDER — 0.9 % SODIUM CHLORIDE 0.9 %
1000 INTRAVENOUS SOLUTION INTRAVENOUS ONCE
Status: COMPLETED | OUTPATIENT
Start: 2023-03-23 | End: 2023-03-23

## 2023-03-23 RX ORDER — SODIUM CHLORIDE 0.9 % (FLUSH) 0.9 %
5-40 SYRINGE (ML) INJECTION PRN
OUTPATIENT
Start: 2023-03-23

## 2023-03-23 RX ORDER — 0.9 % SODIUM CHLORIDE 0.9 %
1000 INTRAVENOUS SOLUTION INTRAVENOUS ONCE
OUTPATIENT
Start: 2023-03-23 | End: 2023-03-23

## 2023-03-23 RX ORDER — HEPARIN SODIUM (PORCINE) LOCK FLUSH IV SOLN 100 UNIT/ML 100 UNIT/ML
500 SOLUTION INTRAVENOUS PRN
OUTPATIENT
Start: 2023-03-23

## 2023-03-23 RX ORDER — SODIUM CHLORIDE 0.9 % (FLUSH) 0.9 %
5-40 SYRINGE (ML) INJECTION PRN
Status: DISCONTINUED | OUTPATIENT
Start: 2023-03-23 | End: 2023-03-24 | Stop reason: HOSPADM

## 2023-03-23 RX ORDER — HEPARIN SODIUM (PORCINE) LOCK FLUSH IV SOLN 100 UNIT/ML 100 UNIT/ML
500 SOLUTION INTRAVENOUS PRN
Status: DISCONTINUED | OUTPATIENT
Start: 2023-03-23 | End: 2023-03-24 | Stop reason: HOSPADM

## 2023-03-23 RX ORDER — SODIUM CHLORIDE 9 MG/ML
5-250 INJECTION, SOLUTION INTRAVENOUS PRN
OUTPATIENT
Start: 2023-03-23

## 2023-03-23 RX ADMIN — SODIUM CHLORIDE 1000 ML: 9 INJECTION, SOLUTION INTRAVENOUS at 15:41

## 2023-03-23 NOTE — DISCHARGE INSTRUCTIONS
heart or kidney disease. When should you call for help? Call 911 anytime you think you may need emergency care. For example, call if:    You passed out (lost consciousness). Call your doctor now or seek immediate medical care if:    You are confused and cannot think clearly. You are dizzy or lightheaded, or you feel like you may faint. You have signs of needing more fluids. You have sunken eyes, a dry mouth, and you pass only a little urine. You cannot keep fluids down. You have diarrhea that lasts for more than a few days. Watch closely for changes in your health, and be sure to contact your doctor if:    You are not making tears. Your skin is very dry and sags slowly back into place after you pinch it. Your mouth and eyes are very dry. Where can you learn more? Go to http://www.woods.com/ and enter Q814 to learn more about \"Dehydration: Care Instructions. \"  Current as of: November 9, 2022               Content Version: 13.6  © 2006-2023 [employer]. Care instructions adapted under license by [facility] ([facility]). If you have questions about a medical condition or this instruction, always ask your healthcare professional. [place] [address] any warranty or liability for your use of this information.

## 2023-03-28 ENCOUNTER — OFFICE VISIT (OUTPATIENT)
Dept: HEMATOLOGY | Age: 73
End: 2023-03-28
Payer: MEDICARE

## 2023-03-28 VITALS
OXYGEN SATURATION: 97 % | BODY MASS INDEX: 28.77 KG/M2 | DIASTOLIC BLOOD PRESSURE: 62 MMHG | WEIGHT: 179 LBS | TEMPERATURE: 98.2 F | HEIGHT: 66 IN | HEART RATE: 98 BPM | SYSTOLIC BLOOD PRESSURE: 130 MMHG

## 2023-03-28 DIAGNOSIS — D63.1 ANEMIA IN STAGE 3 CHRONIC KIDNEY DISEASE, UNSPECIFIED WHETHER STAGE 3A OR 3B CKD (HCC): ICD-10-CM

## 2023-03-28 DIAGNOSIS — N18.30 ANEMIA IN STAGE 3 CHRONIC KIDNEY DISEASE, UNSPECIFIED WHETHER STAGE 3A OR 3B CKD (HCC): ICD-10-CM

## 2023-03-28 DIAGNOSIS — R93.2 ABNORMAL PET SCAN OF LIVER: ICD-10-CM

## 2023-03-28 DIAGNOSIS — Z71.89 CARE PLAN DISCUSSED WITH PATIENT: ICD-10-CM

## 2023-03-28 DIAGNOSIS — E83.52 HYPERCALCEMIA: Primary | ICD-10-CM

## 2023-03-28 PROCEDURE — 3075F SYST BP GE 130 - 139MM HG: CPT | Performed by: INTERNAL MEDICINE

## 2023-03-28 PROCEDURE — G8428 CUR MEDS NOT DOCUMENT: HCPCS | Performed by: INTERNAL MEDICINE

## 2023-03-28 PROCEDURE — G8417 CALC BMI ABV UP PARAM F/U: HCPCS | Performed by: INTERNAL MEDICINE

## 2023-03-28 PROCEDURE — 1090F PRES/ABSN URINE INCON ASSESS: CPT | Performed by: INTERNAL MEDICINE

## 2023-03-28 PROCEDURE — G8484 FLU IMMUNIZE NO ADMIN: HCPCS | Performed by: INTERNAL MEDICINE

## 2023-03-28 PROCEDURE — 1123F ACP DISCUSS/DSCN MKR DOCD: CPT | Performed by: INTERNAL MEDICINE

## 2023-03-28 PROCEDURE — 99214 OFFICE O/P EST MOD 30 MIN: CPT | Performed by: INTERNAL MEDICINE

## 2023-03-28 PROCEDURE — 1036F TOBACCO NON-USER: CPT | Performed by: INTERNAL MEDICINE

## 2023-03-28 PROCEDURE — G8400 PT W/DXA NO RESULTS DOC: HCPCS | Performed by: INTERNAL MEDICINE

## 2023-03-28 PROCEDURE — 3078F DIAST BP <80 MM HG: CPT | Performed by: INTERNAL MEDICINE

## 2023-03-28 PROCEDURE — 3017F COLORECTAL CA SCREEN DOC REV: CPT | Performed by: INTERNAL MEDICINE

## 2023-03-28 NOTE — PROGRESS NOTES
MEDICAL ONCOLOGY PROGRESS NOTE      Anders Maloney   1950  3/28/2023     Chief Complaint   Patient presents with    Follow-up     Hypercalcemia  Anemia in stage 3 chronic kidney disease, unspecified whether stage 3a or 3b CKD (HCC)         INTERVAL HISTORY/HISTORY OF PRESENT ILLNESS:  The patient is a pleasant 68years old female who has been diagnosed with iron deficiency anemia. In addition, she has chronic kidney disease stage IIIa. She has a history hypertension/diabetes. He was hospitalized 3 times here at [facility] and also [facility] with hypercalcemia. PTH was slightly low and PTH related protein was high. SPEP was unremarkable. Vitamin B12 was normal.  The patient had a colonoscopy last year that was unremarkable. She denies any breast complaints. The breast exam was normal and performed by me. She denies any other symptoms. Specifically, no weight loss. She had Zometa 1 time in the hospital with normalization of her calcium levels last year. She had a repeat CMP at that showed calcium level 11.9. She has been experiencing more constipation. Denies any change in urine output. She has been referred to the endocrinology at [facility]. In addition, she received calcitonin and Xgeva last week for worsening hypercalcemia. She is accompanied by her  today. She had a PET scan  performed as well as a bone marrow biopsy. Diagnosis   Iron deficiency secondary to upper GI ulcers   Normal colonoscopy January 2018. Chronic kidney disease stage IIIb  Hypercalcemia of unknown etiology    Treatment summary  PO Niferex every other day   Sep 2018-Injectafer 1500mg  3/1/2023-Zometa x1 dose 4 mg  3/21/2023-Xgeva 120 mg x 1 dose. Calcitonin x2 doses      Interval history  Mrs. Ruma Fatima is a 68year-old  female with a history of iron deficiency anemia secondary to GI blood loss from gastric ulcers. . She continues to take PO iron replacement, Niferex, once daily and tolerating without

## 2023-03-29 ENCOUNTER — HOSPITAL ENCOUNTER (OUTPATIENT)
Dept: MRI IMAGING | Age: 73
Discharge: HOME | End: 2023-03-29
Payer: MEDICARE

## 2023-03-29 ENCOUNTER — TELEPHONE (OUTPATIENT)
Dept: HEMATOLOGY | Age: 73
End: 2023-03-29

## 2023-03-29 DIAGNOSIS — F41.9 ANXIETY IN CANCER PATIENT: Primary | ICD-10-CM

## 2023-03-29 DIAGNOSIS — R93.2 ABNORMAL PET SCAN OF LIVER: ICD-10-CM

## 2023-03-29 PROCEDURE — A9577 INJ MULTIHANCE: HCPCS | Performed by: INTERNAL MEDICINE

## 2023-03-29 PROCEDURE — 6360000004 HC RX CONTRAST MEDICATION: Performed by: INTERNAL MEDICINE

## 2023-03-29 RX ORDER — LORAZEPAM 1 MG/1
TABLET ORAL
Qty: 2 TABLET | Refills: 0 | Status: SHIPPED | OUTPATIENT
Start: 2023-03-29 | End: 2023-03-29

## 2023-03-29 RX ADMIN — GADOBENATE DIMEGLUMINE 17 ML: 529 INJECTION, SOLUTION INTRAVENOUS at 15:28

## 2023-03-29 NOTE — TELEPHONE ENCOUNTER
Pt arrived to [facility] for MRI abdomen and thought she would be having sedation. MRI staff informed patient that they don't do sedation. Pt called requesting something for anxiety so she can have MRI completed. Discussed with Dr Kera Middleton who recommended Ativan 1mg 1 hour prior to MRI and may repeat at time of MRI.  informed of above and voiced understanding. Script sent to pharmacy.

## 2023-03-29 NOTE — TELEPHONE ENCOUNTER
Called to let patient know that she is scheduled for MRI to day at 3:45 with 3:15 arrival. Patient aware of appt and time.

## 2023-03-30 ENCOUNTER — HOSPITAL ENCOUNTER (OUTPATIENT)
Dept: MRI IMAGING | Age: 73
Discharge: HOME | End: 2023-03-30
Payer: MEDICARE

## 2023-03-30 PROCEDURE — 74183 MRI ABD W/O CNTR FLWD CNTR: CPT

## 2023-03-30 PROCEDURE — 74183 MRI ABD W/O CNTR FLWD CNTR: CPT | Performed by: RADIOLOGY

## 2023-04-03 ENCOUNTER — TELEPHONE (OUTPATIENT)
Dept: HEMATOLOGY | Age: 73
End: 2023-04-03

## 2023-04-03 NOTE — TELEPHONE ENCOUNTER
Called patient to notify her of her concern of her results of her MRI Abdomen. I have voiced her to her that we are awaiting to speak to the radiologist for some clarification and we will reach back out to her when we get some clarification. Patient has voiced understanding.

## 2023-04-04 ENCOUNTER — HOSPITAL ENCOUNTER (OUTPATIENT)
Dept: INFUSION THERAPY | Age: 73
Discharge: HOME | End: 2023-04-04
Payer: MEDICARE

## 2023-04-04 DIAGNOSIS — C78.7 METASTASIS TO LIVER (HCC): ICD-10-CM

## 2023-04-04 DIAGNOSIS — R93.89 ABNORMAL FINDING ON CT SCAN: ICD-10-CM

## 2023-04-04 DIAGNOSIS — C22.1 CHOLANGIOCARCINOMA (HCC): Primary | ICD-10-CM

## 2023-04-04 DIAGNOSIS — K76.9 LIVER LESION: ICD-10-CM

## 2023-04-04 DIAGNOSIS — R77.8 OTHER SPECIFIED ABNORMALITIES OF PLASMA PROTEINS: ICD-10-CM

## 2023-04-04 DIAGNOSIS — C22.9 ADENOCARCINOMA OF LIVER (HCC): ICD-10-CM

## 2023-04-04 DIAGNOSIS — C22.1 CHOLANGIOCARCINOMA (HCC): ICD-10-CM

## 2023-04-04 DIAGNOSIS — R16.0 LIVER MASS: ICD-10-CM

## 2023-04-04 DIAGNOSIS — E83.52 HYPERCALCEMIA: Primary | ICD-10-CM

## 2023-04-04 LAB
AFP SERPL-MCNC: 1.9 NG/ML (ref 0–8.3)
CANCER AG19-9 SERPL-ACNC: 24 U/ML (ref 0–35)
CEA SERPL-MCNC: 1.5 NG/ML (ref 0–4.7)
ERYTHROCYTE [DISTWIDTH] IN BLOOD BY AUTOMATED COUNT: 13.8 % (ref 11.7–14.4)
HCT VFR BLD AUTO: 33.5 % (ref 34.1–44.9)
HGB BLD-MCNC: 10.6 G/DL (ref 11.2–15.7)
LYMPHOCYTES # BLD: 1.17 K/UL (ref 1.18–3.74)
LYMPHOCYTES NFR BLD: 22.5 % (ref 19.3–53.1)
MCH RBC QN AUTO: 27.9 PG (ref 25.6–32.2)
MCHC RBC AUTO-ENTMCNC: 31.6 G/DL (ref 32.3–35.5)
MCV RBC AUTO: 88.2 FL (ref 79.4–94.8)
MONOCYTES # BLD: 0.45 K/UL (ref 0.24–0.82)
MONOCYTES NFR BLD: 8.7 % (ref 4.7–12.5)
NEUTROPHILS # BLD: 3.21 K/UL (ref 1.56–6.13)
NEUTS SEG NFR BLD: 61.8 % (ref 34–71.1)
PLATELET # BLD AUTO: 224 K/UL (ref 182–369)
PMV BLD AUTO: 8.9 FL (ref 7.4–10.4)
RBC # BLD AUTO: 3.8 M/UL (ref 3.93–5.22)
WBC # BLD AUTO: 5.19 K/UL (ref 3.98–10.04)

## 2023-04-04 PROCEDURE — 96372 THER/PROPH/DIAG INJ SC/IM: CPT

## 2023-04-04 PROCEDURE — 6360000002 HC RX W HCPCS: Performed by: INTERNAL MEDICINE

## 2023-04-04 PROCEDURE — 85025 COMPLETE CBC W/AUTO DIFF WBC: CPT

## 2023-04-04 PROCEDURE — 36415 COLL VENOUS BLD VENIPUNCTURE: CPT

## 2023-04-04 RX ORDER — CALCITONIN SALMON 200 [USP'U]/ML
400 INJECTION, SOLUTION INTRAMUSCULAR; SUBCUTANEOUS ONCE
Status: COMPLETED | OUTPATIENT
Start: 2023-04-04 | End: 2023-04-04

## 2023-04-04 RX ORDER — CALCITONIN SALMON 200 [USP'U]/ML
400 INJECTION, SOLUTION INTRAMUSCULAR; SUBCUTANEOUS ONCE
Status: CANCELLED
Start: 2023-04-04 | End: 2023-04-04

## 2023-04-04 RX ADMIN — CALCITONIN SALMON 400 UNITS: 200 INJECTION, SOLUTION INTRAMUSCULAR; SUBCUTANEOUS at 14:12

## 2023-04-07 DIAGNOSIS — E83.52 HYPERCALCEMIA: Primary | ICD-10-CM

## 2023-04-17 ENCOUNTER — TELEPHONE (OUTPATIENT)
Dept: HEMATOLOGY | Age: 73
End: 2023-04-17

## 2023-04-17 NOTE — TELEPHONE ENCOUNTER
Patient called and notified that her Calcium 12.1. Patient is scheduled to come in the office tomorrow for calcitonin and zometa. Patient verbalized understanding.  Electronically signed by Oscar Loya RN on 4/17/2023 at 5:35 PM

## 2023-04-18 ENCOUNTER — HOSPITAL ENCOUNTER (OUTPATIENT)
Dept: INFUSION THERAPY | Age: 73
Discharge: HOME | End: 2023-04-18
Payer: MEDICARE

## 2023-04-18 VITALS
HEIGHT: 66 IN | SYSTOLIC BLOOD PRESSURE: 155 MMHG | OXYGEN SATURATION: 98 % | WEIGHT: 184.6 LBS | RESPIRATION RATE: 18 BRPM | TEMPERATURE: 97.9 F | BODY MASS INDEX: 29.67 KG/M2 | DIASTOLIC BLOOD PRESSURE: 77 MMHG | HEART RATE: 99 BPM

## 2023-04-18 DIAGNOSIS — E83.52 HYPERCALCEMIA: Primary | ICD-10-CM

## 2023-04-18 PROCEDURE — 96375 TX/PRO/DX INJ NEW DRUG ADDON: CPT

## 2023-04-18 PROCEDURE — 6360000002 HC RX W HCPCS: Performed by: INTERNAL MEDICINE

## 2023-04-18 PROCEDURE — 2580000003 HC RX 258: Performed by: INTERNAL MEDICINE

## 2023-04-18 PROCEDURE — 96374 THER/PROPH/DIAG INJ IV PUSH: CPT

## 2023-04-18 PROCEDURE — 96372 THER/PROPH/DIAG INJ SC/IM: CPT

## 2023-04-18 RX ORDER — SODIUM CHLORIDE 9 MG/ML
INJECTION, SOLUTION INTRAVENOUS CONTINUOUS
Status: CANCELLED | OUTPATIENT
Start: 2023-05-16

## 2023-04-18 RX ORDER — FAMOTIDINE 10 MG/ML
20 INJECTION, SOLUTION INTRAVENOUS
Status: CANCELLED | OUTPATIENT
Start: 2023-05-16

## 2023-04-18 RX ORDER — ALBUTEROL SULFATE 90 UG/1
4 AEROSOL, METERED RESPIRATORY (INHALATION) PRN
Status: CANCELLED | OUTPATIENT
Start: 2023-05-16

## 2023-04-18 RX ORDER — ONDANSETRON 2 MG/ML
8 INJECTION INTRAMUSCULAR; INTRAVENOUS
Status: CANCELLED | OUTPATIENT
Start: 2023-05-16

## 2023-04-18 RX ORDER — CALCITONIN SALMON 200 [USP'U]/ML
400 INJECTION, SOLUTION INTRAMUSCULAR; SUBCUTANEOUS ONCE
Status: CANCELLED
Start: 2023-04-18 | End: 2023-04-18

## 2023-04-18 RX ORDER — EPINEPHRINE 1 MG/ML
0.3 INJECTION, SOLUTION, CONCENTRATE INTRAVENOUS PRN
Status: CANCELLED | OUTPATIENT
Start: 2023-05-16

## 2023-04-18 RX ORDER — DIPHENHYDRAMINE HYDROCHLORIDE 50 MG/ML
50 INJECTION INTRAMUSCULAR; INTRAVENOUS
Status: CANCELLED | OUTPATIENT
Start: 2023-05-16

## 2023-04-18 RX ORDER — CALCITONIN SALMON 200 [USP'U]/ML
400 INJECTION, SOLUTION INTRAMUSCULAR; SUBCUTANEOUS ONCE
Status: COMPLETED | OUTPATIENT
Start: 2023-04-18 | End: 2023-04-18

## 2023-04-18 RX ORDER — ACETAMINOPHEN 325 MG/1
650 TABLET ORAL
Status: CANCELLED | OUTPATIENT
Start: 2023-05-16

## 2023-04-18 RX ADMIN — CALCITONIN SALMON 400 UNITS: 200 INJECTION, SOLUTION INTRAMUSCULAR; SUBCUTANEOUS at 15:35

## 2023-04-18 RX ADMIN — ZOLEDRONIC ACID 4 MG: 4 INJECTION, SOLUTION, CONCENTRATE INTRAVENOUS at 15:34

## 2023-05-18 DIAGNOSIS — D86.89 GRANULOMA OF LIVER ASSOCIATED WITH SARCOIDOSIS: Primary | ICD-10-CM

## 2023-05-19 PROBLEM — D86.89 GRANULOMA OF LIVER ASSOCIATED WITH SARCOIDOSIS: Status: ACTIVE | Noted: 2023-05-19

## 2023-05-22 NOTE — PROGRESS NOTES
MEDICAL ONCOLOGY PROGRESS NOTE      Dayne Dwyer   1950 5/23/2023     Chief Complaint   Patient presents with    Follow-up     Hypercalcemia       INTERVAL HISTORY/HISTORY OF PRESENT ILLNESS:  The patient is a pleasant 68years old female who has been diagnosed with iron deficiency anemia. In addition, she has chronic kidney disease stage IIIa. She has a history hypertension/diabetes. He was hospitalized 3 times here at [facility] and also [facility] with hypercalcemia. PTH was slightly low and PTH related protein was high. SPEP was unremarkable. Vitamin B12 was normal.  The patient had a colonoscopy last year that was unremarkable. She denies any breast complaints. The breast exam was normal and performed by me. She denies any other symptoms. Specifically, no weight loss. She had Zometa 1 time in the hospital with normalization of her calcium levels last year. She had a repeat CMP at that showed calcium level 11.9. She has been experiencing more constipation. Denies any change in urine output. She has been referred to the endocrinology at [facility]. In addition, she received calcitonin and Xgeva last week for worsening hypercalcemia. She is accompanied by her  today. She had a PET scan that showed liver uptake. Bone marrow biopsy was negative. She also had a liver MRI which was abnormal.  The patient had a liver biopsy performed at [facility] consistent with granulomas concerning for sarcoidosis. Her case will be presented to tumor board today. I have made a referral to pulmonary, sarcoidosis clinic at [facility]. Diagnosis   Iron deficiency secondary to upper GI ulcers   Normal colonoscopy January 2018. Chronic kidney disease stage IIIb  Hypercalcemia of unknown etiology  Liver sarcoidosis    Treatment summary  PO Niferex every other day   Sep 2018-Injectafer 1500mg  3/1/2023-Zometa x1 dose 4 mg  3/21/2023-Xgeva 120 mg x 1 dose.   Calcitonin x2 doses      Interval history  Mrs.

## 2023-05-23 ENCOUNTER — OFFICE VISIT (OUTPATIENT)
Dept: HEMATOLOGY | Age: 73
End: 2023-05-23
Payer: MEDICARE

## 2023-05-23 VITALS
DIASTOLIC BLOOD PRESSURE: 88 MMHG | WEIGHT: 181 LBS | SYSTOLIC BLOOD PRESSURE: 146 MMHG | HEART RATE: 102 BPM | HEIGHT: 66 IN | OXYGEN SATURATION: 97 % | BODY MASS INDEX: 29.09 KG/M2

## 2023-05-23 DIAGNOSIS — Z71.89 CARE PLAN DISCUSSED WITH PATIENT: ICD-10-CM

## 2023-05-23 DIAGNOSIS — N18.30 ANEMIA IN STAGE 3 CHRONIC KIDNEY DISEASE, UNSPECIFIED WHETHER STAGE 3A OR 3B CKD (HCC): ICD-10-CM

## 2023-05-23 DIAGNOSIS — D86.89 GRANULOMA OF LIVER ASSOCIATED WITH SARCOIDOSIS: Primary | ICD-10-CM

## 2023-05-23 DIAGNOSIS — E83.52 HYPERCALCEMIA: ICD-10-CM

## 2023-05-23 DIAGNOSIS — D63.1 ANEMIA IN STAGE 3 CHRONIC KIDNEY DISEASE, UNSPECIFIED WHETHER STAGE 3A OR 3B CKD (HCC): ICD-10-CM

## 2023-05-23 DIAGNOSIS — N18.32 STAGE 3B CHRONIC KIDNEY DISEASE (HCC): ICD-10-CM

## 2023-05-23 PROCEDURE — 3077F SYST BP >= 140 MM HG: CPT | Performed by: INTERNAL MEDICINE

## 2023-05-23 PROCEDURE — G8427 DOCREV CUR MEDS BY ELIG CLIN: HCPCS | Performed by: INTERNAL MEDICINE

## 2023-05-23 PROCEDURE — G8417 CALC BMI ABV UP PARAM F/U: HCPCS | Performed by: INTERNAL MEDICINE

## 2023-05-23 PROCEDURE — 1036F TOBACCO NON-USER: CPT | Performed by: INTERNAL MEDICINE

## 2023-05-23 PROCEDURE — 99213 OFFICE O/P EST LOW 20 MIN: CPT | Performed by: INTERNAL MEDICINE

## 2023-05-23 PROCEDURE — 3017F COLORECTAL CA SCREEN DOC REV: CPT | Performed by: INTERNAL MEDICINE

## 2023-05-23 PROCEDURE — 1090F PRES/ABSN URINE INCON ASSESS: CPT | Performed by: INTERNAL MEDICINE

## 2023-05-23 PROCEDURE — 1123F ACP DISCUSS/DSCN MKR DOCD: CPT | Performed by: INTERNAL MEDICINE

## 2023-05-23 PROCEDURE — 3079F DIAST BP 80-89 MM HG: CPT | Performed by: INTERNAL MEDICINE

## 2023-05-23 PROCEDURE — G8400 PT W/DXA NO RESULTS DOC: HCPCS | Performed by: INTERNAL MEDICINE

## 2023-07-18 ENCOUNTER — OFFICE VISIT (OUTPATIENT)
Dept: HEMATOLOGY | Age: 73
End: 2023-07-18
Payer: MEDICARE

## 2023-07-18 VITALS
HEIGHT: 66 IN | WEIGHT: 198 LBS | SYSTOLIC BLOOD PRESSURE: 180 MMHG | DIASTOLIC BLOOD PRESSURE: 92 MMHG | BODY MASS INDEX: 31.82 KG/M2 | OXYGEN SATURATION: 98 % | HEART RATE: 99 BPM

## 2023-07-18 DIAGNOSIS — Z71.89 CARE PLAN DISCUSSED WITH PATIENT: ICD-10-CM

## 2023-07-18 DIAGNOSIS — D86.89 GRANULOMA OF LIVER ASSOCIATED WITH SARCOIDOSIS: Primary | ICD-10-CM

## 2023-07-18 DIAGNOSIS — E83.52 HYPERCALCEMIA: ICD-10-CM

## 2023-07-18 DIAGNOSIS — F41.9 ANXIETY IN CANCER PATIENT: ICD-10-CM

## 2023-07-18 PROCEDURE — 3080F DIAST BP >= 90 MM HG: CPT | Performed by: INTERNAL MEDICINE

## 2023-07-18 PROCEDURE — 1036F TOBACCO NON-USER: CPT | Performed by: INTERNAL MEDICINE

## 2023-07-18 PROCEDURE — 1090F PRES/ABSN URINE INCON ASSESS: CPT | Performed by: INTERNAL MEDICINE

## 2023-07-18 PROCEDURE — 3017F COLORECTAL CA SCREEN DOC REV: CPT | Performed by: INTERNAL MEDICINE

## 2023-07-18 PROCEDURE — G8417 CALC BMI ABV UP PARAM F/U: HCPCS | Performed by: INTERNAL MEDICINE

## 2023-07-18 PROCEDURE — 99213 OFFICE O/P EST LOW 20 MIN: CPT | Performed by: INTERNAL MEDICINE

## 2023-07-18 PROCEDURE — 1123F ACP DISCUSS/DSCN MKR DOCD: CPT | Performed by: INTERNAL MEDICINE

## 2023-07-18 PROCEDURE — 3077F SYST BP >= 140 MM HG: CPT | Performed by: INTERNAL MEDICINE

## 2023-07-18 PROCEDURE — G8400 PT W/DXA NO RESULTS DOC: HCPCS | Performed by: INTERNAL MEDICINE

## 2023-07-18 PROCEDURE — G8427 DOCREV CUR MEDS BY ELIG CLIN: HCPCS | Performed by: INTERNAL MEDICINE

## 2023-07-18 RX ORDER — PREDNISONE 10 MG/1
10 TABLET ORAL DAILY
COMMUNITY
Start: 2023-06-20

## 2023-07-18 RX ORDER — CLORAZEPATE DIPOTASSIUM 3.75 MG/1
TABLET ORAL
COMMUNITY

## 2023-07-18 RX ORDER — BENZONATATE 100 MG/1
100 CAPSULE ORAL DAILY
COMMUNITY

## 2023-07-18 RX ORDER — AMOXICILLIN 500 MG/1
500 CAPSULE ORAL DAILY
COMMUNITY
Start: 2023-01-12

## 2023-07-18 RX ORDER — LORAZEPAM 0.5 MG/1
0.5 TABLET ORAL EVERY 8 HOURS PRN
Qty: 10 TABLET | Refills: 0 | Status: SHIPPED | OUTPATIENT
Start: 2023-07-18 | End: 2023-08-17

## 2023-08-25 NOTE — PROGRESS NOTES
Lucho Gerardleonidas reports that she was started on Niferex 150 mg daily on 11/13/2017. A CBC that is available from 11/28/2017 revealed a WBC of 6, Hgb 10.8, MCV 86, PLT of 254,000. Serum iron was 57. Most recent follow-up endoscopy was performed on 6/11/2018 which revealed 2 superficial gastric ulcers within the antrum of the stomach and healing of the former duodenal ulcers. Mild to moderate gastritis of the antrum of the stomach was noted. She did have a distal esophageal stricture that was dilated. 1/29/2018-normal colonoscopy. Upper EGD showed 7 gastric ulcers and 4 duodenal ulcers. CBC 2/13/2018 revealed a WBC of 6.7, Hgb 10.4 with MCV 85.3 and PLT of 222,000.   07/20/2018-WBC 8.2, hemoglobin 11.5, PLT of 213,000. Ferritin 52, iron saturation 14%, TIBC 258.   9/5/2018-recommended 2 doses of IV Injectafer 750 mg. September 2018- s/p 1500mg Injectafer IV. She is currently is she is currently at that  10/23/2018- CBC Hb 11.8/MCV90. Normal WBC/Plts. 1/22/2019-CBC with hemoglobin 11.6.   7/23/2019- CBC with a hemoglobin of 11.9 and MCV 92.1, continues to take Niferex daily  01/21/2020- CBC Hb 11.4/91. WBC 5.2, Platelets 046B. Iron 46, TIBC 234, iron saturation 20%  4/6/2021-B12 461  4/27/2021-hemoglobin 11, WBC 5.9, platelets 626,269  9/69/2919-BHTEWLXV Niferex 100 mg p.o. daily  4/22/2022- hemoglobin 0.8, creatinine 1.4, GFR 39, ferritin 255, TIBC 225, iron saturation 18%. 4/26/2022- resume iron sulfate 325 mg p.o. daily  September 2022-she was hospitalized at [facility] with hypercalcemia. PTH low, PTH related protein was high. She received Zometa. Normalization of her calcium levels. 09/09/22- PTH 13 (L)  10/19/22 Iron Studies ([facility]):Iron 45, TIBC 279, Iron Sat 16, Ferritin 201  10/26/2022 CT Chest W Contrast No acute process in CT scan of the chest with contrast enhancement. Small to moderate hiatal hernia. Cholelithiasis. This CT exam was performed using one or more of the following dose reduction

## 2023-08-29 ENCOUNTER — OFFICE VISIT (OUTPATIENT)
Dept: HEMATOLOGY | Age: 73
End: 2023-08-29
Payer: MEDICARE

## 2023-08-29 VITALS
BODY MASS INDEX: 31.34 KG/M2 | SYSTOLIC BLOOD PRESSURE: 160 MMHG | HEIGHT: 66 IN | DIASTOLIC BLOOD PRESSURE: 82 MMHG | WEIGHT: 195 LBS

## 2023-08-29 DIAGNOSIS — Z71.89 CARE PLAN DISCUSSED WITH PATIENT: ICD-10-CM

## 2023-08-29 DIAGNOSIS — D64.9 NORMOCYTIC ANEMIA: ICD-10-CM

## 2023-08-29 DIAGNOSIS — D63.1 ANEMIA IN STAGE 3 CHRONIC KIDNEY DISEASE, UNSPECIFIED WHETHER STAGE 3A OR 3B CKD (HCC): Primary | ICD-10-CM

## 2023-08-29 DIAGNOSIS — N18.30 ANEMIA IN STAGE 3 CHRONIC KIDNEY DISEASE, UNSPECIFIED WHETHER STAGE 3A OR 3B CKD (HCC): Primary | ICD-10-CM

## 2023-08-29 PROCEDURE — 1123F ACP DISCUSS/DSCN MKR DOCD: CPT | Performed by: INTERNAL MEDICINE

## 2023-08-29 PROCEDURE — 1036F TOBACCO NON-USER: CPT | Performed by: INTERNAL MEDICINE

## 2023-08-29 PROCEDURE — 1090F PRES/ABSN URINE INCON ASSESS: CPT | Performed by: INTERNAL MEDICINE

## 2023-08-29 PROCEDURE — G8400 PT W/DXA NO RESULTS DOC: HCPCS | Performed by: INTERNAL MEDICINE

## 2023-08-29 PROCEDURE — 3079F DIAST BP 80-89 MM HG: CPT | Performed by: INTERNAL MEDICINE

## 2023-08-29 PROCEDURE — G8427 DOCREV CUR MEDS BY ELIG CLIN: HCPCS | Performed by: INTERNAL MEDICINE

## 2023-08-29 PROCEDURE — 99213 OFFICE O/P EST LOW 20 MIN: CPT | Performed by: INTERNAL MEDICINE

## 2023-08-29 PROCEDURE — 3017F COLORECTAL CA SCREEN DOC REV: CPT | Performed by: INTERNAL MEDICINE

## 2023-08-29 PROCEDURE — G8417 CALC BMI ABV UP PARAM F/U: HCPCS | Performed by: INTERNAL MEDICINE

## 2023-08-29 PROCEDURE — 3077F SYST BP >= 140 MM HG: CPT | Performed by: INTERNAL MEDICINE

## 2023-08-29 RX ORDER — MYCOPHENOLATE MOFETIL 500 MG/1
500 TABLET ORAL DAILY
Qty: 30 TABLET | Refills: 2 | COMMUNITY
Start: 2023-08-28 | End: 2023-10-28

## 2023-10-23 ENCOUNTER — TELEPHONE (OUTPATIENT)
Dept: HEMATOLOGY | Age: 73
End: 2023-10-23

## 2023-10-23 NOTE — TELEPHONE ENCOUNTER
Rebecca Shell called patient to remind them of their appointment on 10/24/2023. Detailed voicemail was left with appointment date and time.

## 2023-10-23 NOTE — PROGRESS NOTES
None    Highest education level: None   Tobacco Use    Smoking status: Never    Smokeless tobacco: Never   Vaping Use    Vaping Use: Never used   Substance and Sexual Activity    Alcohol use: Never    Drug use: Never     FAMILY HISTORY:  Family History   Problem Relation Age of Onset    Colon Cancer Mother     Liver Cancer Mother         mets    Dementia Father       Current Outpatient Medications   Medication Sig Dispense Refill    mycophenolate (CELLCEPT) 500 MG tablet Take 1 tablet by mouth daily 30 tablet 2    benzonatate (TESSALON) 100 MG capsule Take 1 capsule by mouth daily      ondansetron (ZOFRAN-ODT) 4 MG disintegrating tablet Place 2 tablets under the tongue every 8 hours as needed for Nausea or Vomiting 21 tablet 2    HYDROcodone-acetaminophen (NORCO) 5-325 MG per tablet Take 1 tablet by mouth every 6 hours as needed for Pain. acetaminophen (TYLENOL) 325 MG tablet Take 2 tablets by mouth every 8 hours as needed for Pain      vitamin B-12 (CYANOCOBALAMIN) 500 MCG tablet Take 1 tablet by mouth every 48 hours      aspirin EC 81 MG EC tablet Take 1 tablet by mouth in the morning and 1 tablet before bedtime.  60 tablet 0    estradiol (ESTRACE) 0.1 MG/GM vaginal cream Place 2 g vaginally daily Indications: Vaginitis      Probiotic Product (PROBIOTIC DAILY PO) Take 1 tablet by mouth daily Indications: Treatment for the Prevention of Constipation      omeprazole (PRILOSEC) 20 MG delayed release capsule Take 1 capsule by mouth daily Indications: Gastroesophageal Reflux Disease      atorvastatin (LIPITOR) 10 MG tablet Take 1 tablet by mouth every evening Indications: High Amount of Fats in the Blood      cyclobenzaprine (FLEXERIL) 5 MG tablet Take 1 tablet by mouth 2 times daily Indications: Restless Leg Syndrome      losartan (COZAAR) 100 MG tablet Take 1 tablet by mouth daily      metFORMIN (GLUCOPHAGE) 500 MG tablet Take 1 tablet by mouth every morning (before breakfast) Indications: Diabetes

## 2023-10-24 ENCOUNTER — OFFICE VISIT (OUTPATIENT)
Dept: HEMATOLOGY | Age: 73
End: 2023-10-24
Payer: MEDICARE

## 2023-10-24 VITALS
HEART RATE: 88 BPM | HEIGHT: 66 IN | DIASTOLIC BLOOD PRESSURE: 72 MMHG | OXYGEN SATURATION: 99 % | WEIGHT: 200 LBS | BODY MASS INDEX: 32.14 KG/M2 | SYSTOLIC BLOOD PRESSURE: 130 MMHG

## 2023-10-24 DIAGNOSIS — Z71.89 CARE PLAN DISCUSSED WITH PATIENT: Primary | ICD-10-CM

## 2023-10-24 DIAGNOSIS — D63.1 ANEMIA IN STAGE 3 CHRONIC KIDNEY DISEASE, UNSPECIFIED WHETHER STAGE 3A OR 3B CKD (HCC): ICD-10-CM

## 2023-10-24 DIAGNOSIS — E83.52 HYPERCALCEMIA DUE TO GRANULOMATOUS DISEASE (HCC): ICD-10-CM

## 2023-10-24 DIAGNOSIS — D86.89 GRANULOMA OF LIVER ASSOCIATED WITH SARCOIDOSIS: ICD-10-CM

## 2023-10-24 DIAGNOSIS — D64.9 NORMOCYTIC ANEMIA: ICD-10-CM

## 2023-10-24 DIAGNOSIS — N18.30 ANEMIA IN STAGE 3 CHRONIC KIDNEY DISEASE, UNSPECIFIED WHETHER STAGE 3A OR 3B CKD (HCC): ICD-10-CM

## 2023-10-24 DIAGNOSIS — D71 HYPERCALCEMIA DUE TO GRANULOMATOUS DISEASE (HCC): ICD-10-CM

## 2023-10-24 PROCEDURE — 1036F TOBACCO NON-USER: CPT | Performed by: INTERNAL MEDICINE

## 2023-10-24 PROCEDURE — 3017F COLORECTAL CA SCREEN DOC REV: CPT | Performed by: INTERNAL MEDICINE

## 2023-10-24 PROCEDURE — 3075F SYST BP GE 130 - 139MM HG: CPT | Performed by: INTERNAL MEDICINE

## 2023-10-24 PROCEDURE — G8417 CALC BMI ABV UP PARAM F/U: HCPCS | Performed by: INTERNAL MEDICINE

## 2023-10-24 PROCEDURE — 1123F ACP DISCUSS/DSCN MKR DOCD: CPT | Performed by: INTERNAL MEDICINE

## 2023-10-24 PROCEDURE — 99213 OFFICE O/P EST LOW 20 MIN: CPT | Performed by: INTERNAL MEDICINE

## 2023-10-24 PROCEDURE — G8427 DOCREV CUR MEDS BY ELIG CLIN: HCPCS | Performed by: INTERNAL MEDICINE

## 2023-10-24 PROCEDURE — G8400 PT W/DXA NO RESULTS DOC: HCPCS | Performed by: INTERNAL MEDICINE

## 2023-10-24 PROCEDURE — 1090F PRES/ABSN URINE INCON ASSESS: CPT | Performed by: INTERNAL MEDICINE

## 2023-10-24 PROCEDURE — G8484 FLU IMMUNIZE NO ADMIN: HCPCS | Performed by: INTERNAL MEDICINE

## 2023-10-24 PROCEDURE — 3078F DIAST BP <80 MM HG: CPT | Performed by: INTERNAL MEDICINE

## 2023-10-31 ENCOUNTER — TELEPHONE (OUTPATIENT)
Dept: VASCULAR SURGERY | Facility: CLINIC | Age: 73
End: 2023-10-31
Payer: MEDICARE

## 2023-11-01 ENCOUNTER — HOSPITAL ENCOUNTER (OUTPATIENT)
Dept: ULTRASOUND IMAGING | Facility: HOSPITAL | Age: 73
Discharge: HOME | End: 2023-11-01
Payer: MEDICARE

## 2023-11-01 ENCOUNTER — OFFICE VISIT (OUTPATIENT)
Dept: VASCULAR SURGERY | Facility: CLINIC | Age: 73
End: 2023-11-01
Payer: MEDICARE

## 2023-11-01 VITALS
DIASTOLIC BLOOD PRESSURE: 82 MMHG | OXYGEN SATURATION: 98 % | BODY MASS INDEX: 32.14 KG/M2 | SYSTOLIC BLOOD PRESSURE: 164 MMHG | HEART RATE: 88 BPM | HEIGHT: 66 IN | WEIGHT: 200 LBS

## 2023-11-01 DIAGNOSIS — E78.5 HYPERLIPIDEMIA, UNSPECIFIED HYPERLIPIDEMIA TYPE: ICD-10-CM

## 2023-11-01 DIAGNOSIS — I10 ESSENTIAL HYPERTENSION: ICD-10-CM

## 2023-11-01 DIAGNOSIS — I65.23 BILATERAL CAROTID ARTERY STENOSIS: Primary | ICD-10-CM

## 2023-11-01 DIAGNOSIS — I65.23 BILATERAL CAROTID ARTERY STENOSIS: ICD-10-CM

## 2023-11-01 PROCEDURE — 99214 OFFICE O/P EST MOD 30 MIN: CPT | Performed by: NURSE PRACTITIONER

## 2023-11-01 PROCEDURE — 1160F RVW MEDS BY RX/DR IN RCRD: CPT | Performed by: NURSE PRACTITIONER

## 2023-11-01 PROCEDURE — 3079F DIAST BP 80-89 MM HG: CPT | Performed by: NURSE PRACTITIONER

## 2023-11-01 PROCEDURE — 93880 EXTRACRANIAL BILAT STUDY: CPT

## 2023-11-01 PROCEDURE — 1159F MED LIST DOCD IN RCRD: CPT | Performed by: NURSE PRACTITIONER

## 2023-11-01 PROCEDURE — 3077F SYST BP >= 140 MM HG: CPT | Performed by: NURSE PRACTITIONER

## 2023-11-01 RX ORDER — METOPROLOL SUCCINATE 25 MG/1
25 TABLET, EXTENDED RELEASE ORAL 2 TIMES DAILY
COMMUNITY
Start: 2023-07-24

## 2023-11-01 RX ORDER — BENZONATATE 100 MG/1
100 CAPSULE ORAL AS NEEDED
COMMUNITY

## 2023-11-01 RX ORDER — HYDROXYZINE PAMOATE 25 MG/1
25 CAPSULE ORAL AS NEEDED
COMMUNITY

## 2023-11-01 RX ORDER — MYCOPHENOLATE MOFETIL 500 MG/1
500 TABLET ORAL 4 TIMES DAILY
COMMUNITY
Start: 2023-08-28

## 2023-11-01 NOTE — LETTER
"November 1, 2023     Crow Landa MD  [address] [facility] [address]    Patient: Radha Rodriguez   YOB: 1950   Date of Visit: 11/1/2023     Dear Crow Landa MD:       Thank you for referring Radha Rodriguez to me for evaluation. Below are the relevant portions of my assessment and plan of care.    If you have questions, please do not hesitate to call me. I look forward to following Radha along with you.         Sincerely,        GUNNAR Damon        CC: No Recipients    Debbie Reeder APRN  11/01/23 1330  Sign when Signing Visit  11/1/2023       Crow Landa MD   [address] [place] [id number]    Radha Rodriguez  1950    Chief Complaint   Patient presents with   • Follow-up     1 year follow up w/ testing. Last seen 11/28/22. Patient denies any stroke type symptoms.        Dear Crow Landa MD       HPI  I had the pleasure of seeing your patient Radha Rodriguez in the office today.   As you recall, Radha Rodriguez is a 73 y.o.  female who we are following for asymptomatic carotid occlusive disease.  Currently she is doing well and denies any strokelike symptoms.  She is maintained on aspirin and Lipitor.  She did have noninvasive testing performed today, which I did review in office.       Review of Systems   Constitutional: Negative.    HENT: Negative.     Eyes: Negative.    Respiratory: Negative.     Cardiovascular:  Positive for leg swelling.   Gastrointestinal: Negative.    Endocrine: Negative.    Genitourinary: Negative.    Musculoskeletal:  Positive for arthralgias and gait problem.   Skin: Negative.    Allergic/Immunologic: Negative.    Hematological: Negative.    Psychiatric/Behavioral: Negative.     All other systems reviewed and are negative.       /82   Pulse 88   Ht 167.6 cm (66\")   Wt 90.7 kg (200 lb)   SpO2 98%   BMI 32.28 kg/m²   Physical Exam  Vitals and nursing note reviewed.   Constitutional:       General: She is " not in acute distress.     Appearance: Normal appearance. She is well-developed. She is obese. She is not diaphoretic.   HENT:      Head: Normocephalic and atraumatic.   Eyes:      General: No scleral icterus.     Pupils: Pupils are equal, round, and reactive to light.   Neck:      Thyroid: No thyromegaly.      Vascular: No carotid bruit or JVD.   Cardiovascular:      Rate and Rhythm: Normal rate and regular rhythm.      Pulses: Normal pulses.      Heart sounds: Normal heart sounds and S2 normal. No murmur heard.     No friction rub. No gallop.      Comments: Varicose veins to bilateral lower extremities.  Pulmonary:      Effort: Pulmonary effort is normal.      Breath sounds: Normal breath sounds.   Abdominal:      General: Bowel sounds are normal.      Palpations: Abdomen is soft.   Musculoskeletal:         General: Swelling present. Normal range of motion.      Cervical back: Normal range of motion and neck supple.   Skin:     General: Skin is warm and dry.   Neurological:      Mental Status: She is alert and oriented to person, place, and time.      Cranial Nerves: No cranial nerve deficit.   Psychiatric:         Mood and Affect: Mood normal.         Behavior: Behavior normal. Behavior is cooperative.         Thought Content: Thought content normal.         Judgment: Judgment normal.           Diagnostic Data:  Noninvasive testing including a carotid duplex shows 50 to 69% right carotid stenosis and less than 50% left carotid stenosis with bilateral antegrade vertebral flow.      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 Bilateral carotid artery stenosis  I65.23 433.10     433.30   2. Essential hypertension  I10 401.9   3. Hyperlipidemia, unspecified hyperlipidemia type  E78.5 272.4         Plan: After thoroughly evaluating  Radha Rodriguez, I believe the best course of action is to remain conservative from vascular surgery standpoint.  Currently she is doing well and denies any strokelike symptoms.  I did review her testing which shows 50 to 69% right carotid stenosis and less than 50% left carotid stenosis.  I will see her back in 1 year with repeat noninvasive testing for continued surveillance, including a carotid duplex.  I did discuss vascular risk factors as they pertain to the progression of vascular disease including controlling her hypertension and hyperlipidemia.  Her blood pressure is elevated at 164/82.  She should continue her aspirin 81 mg daily and Lipitor 10 mg daily in addition to her other medications.  Her diabetes is well controlled and most recent hemoglobin A1c on file is 5%.  She can continue taking her medications as previously discussed.  This was all discussed in full with complete understanding.     Thank you for allowing me to participate in the care of your patient.  Please do not hesitate with any questions or concerns.  I will keep you aware of any further encounters with Radha Rodriguez.        Sincerely yours,         GUNNAR Damon, Crow Hargrove MD

## 2023-11-01 NOTE — PROGRESS NOTES
"11/1/2023       Crow Landa MD   [address] [facility] [address]    Radha Rodriguez  1950    Chief Complaint   Patient presents with    Follow-up     1 year follow up w/ testing. Last seen 11/28/22. Patient denies any stroke type symptoms.        Dear Crow Landa MD       HPI  I had the pleasure of seeing your patient Radha Rodriguez in the office today.   As you recall, Radha Rodriguez is a 73 y.o.  female who we are following for asymptomatic carotid occlusive disease.  Currently she is doing well and denies any strokelike symptoms.  She is maintained on aspirin and Lipitor.  She did have noninvasive testing performed today, which I did review in office.       Review of Systems   Constitutional: Negative.    HENT: Negative.     Eyes: Negative.    Respiratory: Negative.     Cardiovascular:  Positive for leg swelling.   Gastrointestinal: Negative.    Endocrine: Negative.    Genitourinary: Negative.    Musculoskeletal:  Positive for arthralgias and gait problem.   Skin: Negative.    Allergic/Immunologic: Negative.    Hematological: Negative.    Psychiatric/Behavioral: Negative.     All other systems reviewed and are negative.       /82   Pulse 88   Ht 167.6 cm (66\")   Wt 90.7 kg (200 lb)   SpO2 98%   BMI 32.28 kg/m²   Physical Exam  Vitals and nursing note reviewed.   Constitutional:       General: She is not in acute distress.     Appearance: Normal appearance. She is well-developed. She is obese. She is not diaphoretic.   HENT:      Head: Normocephalic and atraumatic.   Eyes:      General: No scleral icterus.     Pupils: Pupils are equal, round, and reactive to light.   Neck:      Thyroid: No thyromegaly.      Vascular: No carotid bruit or JVD.   Cardiovascular:      Rate and Rhythm: Normal rate and regular rhythm.      Pulses: Normal pulses.      Heart sounds: Normal heart sounds and S2 normal. No murmur heard.     No friction rub. No gallop.      Comments: Varicose veins to " bilateral lower extremities.  Pulmonary:      Effort: Pulmonary effort is normal.      Breath sounds: Normal breath sounds.   Abdominal:      General: Bowel sounds are normal.      Palpations: Abdomen is soft.   Musculoskeletal:         General: Swelling present. Normal range of motion.      Cervical back: Normal range of motion and neck supple.   Skin:     General: Skin is warm and dry.   Neurological:      Mental Status: She is alert and oriented to person, place, and time.      Cranial Nerves: No cranial nerve deficit.   Psychiatric:         Mood and Affect: Mood normal.         Behavior: Behavior normal. Behavior is cooperative.         Thought Content: Thought content normal.         Judgment: Judgment normal.           Diagnostic Data:  Noninvasive testing including a carotid duplex shows 50 to 69% right carotid stenosis and less than 50% left carotid stenosis with bilateral antegrade vertebral flow.      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 Bilateral carotid artery stenosis  I65.23 433.10     433.30   2. Essential hypertension  I10 401.9   3. Hyperlipidemia, unspecified hyperlipidemia type  E78.5 272.4         Plan: After thoroughly evaluating Radha Rodriguez, I believe the best course of action is to remain conservative from vascular surgery standpoint.  Currently she is doing well and denies any strokelike symptoms.  I did review her testing which shows 50 to 69% right carotid stenosis and less than 50% left carotid stenosis.  I will see her back in 1 year with repeat noninvasive testing for continued surveillance, including a carotid duplex.  I did discuss vascular risk factors as they pertain to the progression of vascular disease including controlling her hypertension and hyperlipidemia.  Her blood pressure is elevated at  164/82.  She should continue her aspirin 81 mg daily and Lipitor 10 mg daily in addition to her other medications.  Her diabetes is well controlled and most recent hemoglobin A1c on file is 5%.  She can continue taking her medications as previously discussed.  This was all discussed in full with complete understanding.     Thank you for allowing me to participate in the care of your patient.  Please do not hesitate with any questions or concerns.  I will keep you aware of any further encounters with Radha Rodriguez.        Sincerely yours,         GUNNAR Damon, Crow Hargrove MD

## 2024-02-08 NOTE — PROGRESS NOTES
following dose reduction techniques: automated exposure control, adjustment of the mA and/or kV according to patient size, and/or use of iterative reconstruction technique.  10/26/2022 CT Abd/Pelvis W IV Contrast (oral) No acute pathology seen in abdomen or pelvis. Cholelithiasis. Small hiatal hernia.   11/8/22 PTH ([facility]) 49, normal  11/23/22-Calcium 9.1  12/28/22 Bilateral  Mammogram ([facility])No suspicious abnormalities in Right or left breast.  1/19/23 Calcium ([facility]) 10.7  3/1/2023-calcium 12.8.  Creatinine 1.8.  PTH 12.6 (L), vitamin D 25-hydroxy 39, vitamin D 125 di-hydroxy 109 (H), PTH related protein 5.2 (H). Recommended Zometa.  3/1/2023-Zometa 4 mg IV x1 dose  3/14/23 PET scan: Head and Neck: There is uptake in the salivary glands and pharyngeal lymphatic tissue, likely physiologic. There is no hypermetabolic cervical adenopathy. The thyroid is unremarkable. Chest: There is no hypermetabolic axillary, mediastinal, or hilar adenopathy. The heart is normal in size.There is no pericardial effusion. No pleural effusion. There are no suspicious pulmonary nodules. Abdomen and Pelvis: Hypermetabolic uptake involving the medial aspect of the left upper lobe superiorly and overlapping with the heart measures a max SUV of 11.0 corresponding to uptake measuring 6.8 x 5.7 cm of unclear etiology.No obvious abnormalities appreciated on the CT component of the exam.Is unclear if this is related to cardiac activity with some missed registration. No suspicious uptake in the liver, gallbladder, spleen, pancreas and adrenal glands.Cholelithiasis. There is no hydronephrosis.FDG avid urine in the renal collecting systems and bladder.Activity within the bladder limits assessment of adjacent pelvic structures. There is uptake in the bowel, likely physiologic. There is no hypermetabolic abdominal or pelvic adenopathy.Musculoskeletal: Some mild uptake in the right iliac bone max SUV 2.5 compared to the left foot has a max SUV of

## 2024-02-12 ENCOUNTER — TELEPHONE (OUTPATIENT)
Dept: HEMATOLOGY | Age: 74
End: 2024-02-12

## 2024-02-12 NOTE — TELEPHONE ENCOUNTER
Called patient and reminded patient of their appointment on 02/13/2024 and patient confirmed they would be here. ([facility])

## 2024-02-13 ENCOUNTER — OFFICE VISIT (OUTPATIENT)
Dept: HEMATOLOGY | Age: 74
End: 2024-02-13

## 2024-02-13 VITALS
HEIGHT: 66 IN | SYSTOLIC BLOOD PRESSURE: 156 MMHG | OXYGEN SATURATION: 96 % | HEART RATE: 93 BPM | WEIGHT: 196 LBS | DIASTOLIC BLOOD PRESSURE: 82 MMHG | BODY MASS INDEX: 31.5 KG/M2

## 2024-02-13 DIAGNOSIS — Z71.89 CARE PLAN DISCUSSED WITH PATIENT: Primary | ICD-10-CM

## 2024-02-13 DIAGNOSIS — N18.30 ANEMIA IN STAGE 3 CHRONIC KIDNEY DISEASE, UNSPECIFIED WHETHER STAGE 3A OR 3B CKD (HCC): ICD-10-CM

## 2024-02-13 DIAGNOSIS — D63.1 ANEMIA IN STAGE 3 CHRONIC KIDNEY DISEASE, UNSPECIFIED WHETHER STAGE 3A OR 3B CKD (HCC): ICD-10-CM

## 2024-04-02 NOTE — PROGRESS NOTES
[facility] - PODIATRY    Today's Date: 04/09/2024     Patient Name: Radha Rodriguez  MRN: [id number]  CSN: [id number]  PCP: Crow Landa MD  Referring Provider: Crow Landa*    SUBJECTIVE     Chief Complaint   Patient presents with    [facility] Care     Crow Landa 10/16/2023 PAIN IN TOE-pt states she is here today for hammer toes bunions and a toe nail that was removed but some of the nail was left behind-pt presents with walker    Diabetes     113 mg/dl BG     HPI: Radha Rodriguez, a 74 y.o.female, comes to clinic as a(n) new patient presenting for diabetic foot exam, complaining of foot pain, and complaining of toenail/callus issues. Patient has h/o anxiety, arthritis, carotid bruit, CKD, claustrophobia, DM2, GERD, HLD, HTN, sarcoidosis . Patient is NIDDM with last stated BG level of 113mg/dl.  Relates minimal numbness and tingling in her feet.  Denies open wounds or sores.  Relates a problem with the remaining spicule of her right great toenail.  Notes that he was probably removed several years ago but a small amount remains and is tender to touch.  She also notes calluses on the inside of her right foot that are occasionally bothersome.  Notes a bunion and hammertoe of the right foot that has slowly worsened over time.  She has previously seen podiatry in [place] but ultimately not follow-up for further evaluation.  She is currently undergoing recovery from a right knee replacement and utilizing a walker.  Admits pain at 3/10 level and described as aching, nagging, and dull. Relates previous treatment(s) including nail removal . Denies any constitutional symptoms. No other pedal complaints at this time.    Past Medical History:   Diagnosis Date    Anemia     Anxiety 2022    Arthritis 2010    Knees and hips    Carotid bruit     Chronic kidney disease 2016    See kidney specialist yearly    Claustrophobia 2001    Diabetes mellitus     GERD (gastroesophageal reflux  disease)     Hyperlipidemia     Hypertension     Sarcoidosis      Past Surgical History:   Procedure Laterality Date    ANKLE SURGERY Left     fracture repair w/ two screws    COLONOSCOPY      COLONOSCOPY N/A 03/23/2021    Procedure: COLONOSCOPY WITH ANESTHESIA;  Surgeon: Francis Elmore DO;  Location: [facility] ENDOSCOPY;  Service: Gastroenterology;  Laterality: N/A;  pre: hx of colon polyps  post: normal  Joceline Murguia H, APRN    TOTAL KNEE ARTHROPLASTY Right      Family History   Problem Relation Age of Onset    Cancer Mother         colon    Colon cancer Mother     Diabetes Brother     Atrial fibrillation Brother     Esophageal cancer Neg Hx      Social History     Socioeconomic History    Marital status:    Tobacco Use    Smoking status: Never     Passive exposure: Never    Smokeless tobacco: Never   Vaping Use    Vaping status: Never Used   Substance and Sexual Activity    Alcohol use: No    Drug use: No    Sexual activity: Never     No Known Allergies  Current Outpatient Medications   Medication Sig Dispense Refill    acetaminophen (TYLENOL) 650 MG 8 hr tablet Take 350 mg by mouth Every 8 (Eight) Hours As Needed for Mild Pain. Rapid release PRn      aspirin 81 MG EC tablet       atorvastatin (LIPITOR) 10 MG tablet Take 1 tablet by mouth Daily.      citalopram (CeleXA) 20 MG tablet Take 1 tablet by mouth Daily.      Coenzyme Q10 (Co Q 10) 100 MG capsule Take 200 mg by mouth Daily.      cyclobenzaprine (FLEXERIL) 5 MG tablet Take 1 tablet by mouth 2 (Two) Times a Day As Needed.      estradiol (ESTRACE) 0.1 MG/GM vaginal cream estradiol 0.01% (0.1 mg/gram) vaginal cream      HYDROcodone-acetaminophen (NORCO) 5-325 MG per tablet Take 1 tablet by mouth As Needed.      levocetirizine (XYZAL) 5 MG tablet Take 1 tablet by mouth As Needed for Allergies.      losartan (COZAAR) 100 MG tablet Take 1 tablet by mouth Daily.      metFORMIN (GLUCOPHAGE) 500 MG tablet Take 1 tablet by mouth 2 (Two) Times a Day With  Meals.      metoprolol succinate XL (TOPROL-XL) 25 MG 24 hr tablet Take 1 tablet by mouth 2 (Two) Times a Day.      mycophenolate (CELLCEPT) 500 MG tablet Take 1 tablet by mouth 4 (Four) Times a Day.      omeprazole (priLOSEC) 20 MG capsule Take 1 capsule by mouth Daily.      ondansetron (ZOFRAN) 4 MG tablet Take 1 tablet by mouth Every 8 (Eight) Hours As Needed for Nausea or Vomiting.      rOPINIRole (REQUIP) 1 MG tablet Take 1 tablet by mouth Every Night.      tolterodine LA (DETROL LA) 4 MG 24 hr capsule Take 1 capsule by mouth Every Night.      vitamin B-12 (CYANOCOBALAMIN) 500 MCG tablet Take 1 tablet by mouth Every Other Day.      hydrOXYzine pamoate (VISTARIL) 25 MG capsule Take 1 capsule by mouth As Needed. (Patient not taking: Reported on 2024)       No current facility-administered medications for this visit.     Review of Systems   Constitutional:  Negative for chills and fever.   HENT:  Negative for congestion.    Respiratory:  Negative for shortness of breath.    Cardiovascular:  Positive for leg swelling. Negative for chest pain.   Gastrointestinal:  Negative for constipation, diarrhea, nausea and vomiting.   Musculoskeletal:  Positive for arthralgias and gait problem.        Foot pain   Skin:  Negative for wound.   Psychiatric/Behavioral:  Negative for agitation.        OBJECTIVE     Vitals:    24 1013   BP: 116/62   Pulse: 85   SpO2: 96%       PHYSICAL EXAM  GEN:   Accompanied by .     Foot/Ankle Exam    GENERAL  Appearance:  appears stated age  Orientation:  AAOx3  Affect:  appropriate  Gait:  antalgic  Assistance:  walker  Right shoe gear: casual shoe  Left shoe gear: casual shoe    VASCULAR     Right Foot Vascularity   Dorsalis pedis:  2+  Posterior tibial:  2+  Skin temperature:  warm  Edema gradin+ and pitting  CFT:  3  Pedal hair growth:  Present  Varicosities:  spider veins     Left Foot Vascularity   Dorsalis pedis:  2+  Posterior tibial:  2+  Skin temperature:   warm  Edema gradin+ and pitting  CFT:  3  Pedal hair growth:  Present  Varicosities:  spider veins     NEUROLOGIC     Right Foot Neurologic   Light touch sensation: diminished  Vibratory sensation: diminished  Hot/Cold sensation: diminished  Protective Sensation using [place]-Valente Monofilament:   Sites intact: 10  Sites tested: 10     Left Foot Neurologic   Light touch sensation: diminished  Vibratory sensation: diminished  Hot/Cold sensation:  diminished  Protective Sensation using [place]-Val[place] Monofilament:   Sites intact: 10  Sites tested: 10    MUSCULOSKELETAL     Right Foot Musculoskeletal   Ecchymosis:  none  Tenderness:  MTP 1 dorsal tenderness and toe 2 tenderness    Arch:  Normal  Hammertoe:  Second toe  Hallux valgus: Yes    Hallux limitus: Yes       Left Foot Musculoskeletal   Ecchymosis:  none  Tenderness:  none  Arch:  Normal    MUSCLE STRENGTH     Right Foot Muscle Strength   Foot dorsiflexion:  5  Foot plantar flexion:  5  Foot inversion:  5  Foot eversion:  5     Left Foot Muscle Strength   Foot dorsiflexion:  5  Foot plantar flexion:  5  Foot inversion:  5  Foot eversion:  5    RANGE OF MOTION     Right Foot Range of Motion   Foot and ankle ROM within normal limits       Left Foot Range of Motion   Foot and ankle ROM within normal limits      DERMATOLOGIC      Right Foot Dermatologic   Skin  Positive for corn.   Nails  1.  Positive for ingrown toenail (medial spicule remains).     Left Foot Dermatologic   Skin  Left foot skin is intact.     Image:       RADIOLOGY/NUCLEAR:  No results found.    LABORATORY/CULTURE RESULTS:      PATHOLOGY RESULTS:       ASSESSMENT/PLAN     Diagnoses and all orders for this visit:    1. Ingrown toenail (Primary)  -     Nail Removal  -     lidocaine (XYLOCAINE) 2% injection 5 mL    2. Foot callus    3. Foot pain, right  -     XR Foot 3+ View Right; Future    4. Hallux valgus, right    5. Hammer toe of right foot    6. Plantar plate injury, right, initial  encounter    7. Type 2 diabetes mellitus with diabetic neuropathy, without long-term current use of insulin      Comprehensive lower extremity examination and evaluation was performed.  Discussed findings and treatment plan including risks, benefits, and treatment options with patient in detail. Patient agreed with treatment plan.  After verbal consent obtained, calluses x2 pared utilizing dermal curette and/or scalpel without incidence  Patient may maintain nails and calluses at home utilizing emery board or pumice stone between visits as needed  Reviewed at home diabetic foot care including daily foot checks  After written consent obtained, total/partial nail avulsion(s) performed as documented in procedure note. Post-procedure instructions given.    Patient also has findings of hallux valgus, hammertoe, and plantar plate issues of the second MTPJ.    Discussed conservative therapies including padding and toe splint versus surgical intervention.  Will reevaluate at follow-up.  Updated x-rays were ordered for further evaluation.  An After Visit Summary was printed and given to the patient at discharge, including (if requested) any available informative/educational handouts regarding diagnosis, treatment, or medications. All questions were answered to patient/family satisfaction. Should symptoms fail to improve or worsen they agree to call or return to clinic or to go to the Emergency Department. Discussed the importance of following up with any needed screening tests/labs/specialist appointments and any requested follow-up recommended by me today. Importance of maintaining follow-up discussed and patient accepts that missed appointments can delay diagnosis and potentially lead to worsening of conditions.  Return in about 3 weeks (around 4/30/2024) for Recheck, Follow-up with Dr. Vasquez., or sooner if acute issues arise.      Nail Removal    Date/Time: 4/9/2024 10:42 AM    Performed by: Dakota Vasquez,  RENEE  Authorized by: Dakota Vasquez DPM  Location: right foot  Location details: right big toe  Anesthesia: digital block    Anesthesia:  Local Anesthetic: lidocaine 2% without epinephrine  Anesthetic total: 5 mL    Sedation:  Patient sedated: no    Preparation: skin prepped with providone-iodine  Amount removed: partial  Side: medial  Wedge excision of skin of nail fold: no  Nail bed sutured: no  Nail matrix removed: partial (phenol)  Removed nail replaced and anchored: no  Dressing: antibiotic ointment and dressing applied  Patient tolerance: patient tolerated the procedure well with no immediate complications  Comments: Flushed with alcohol. Applied silvadene.           This document has been electronically signed by Dakota Vasquez DPM on April 9, 2024 13:00 CDT

## 2024-04-08 ENCOUNTER — TELEPHONE (OUTPATIENT)
Dept: PODIATRY | Facility: CLINIC | Age: 74
End: 2024-04-08
Payer: MEDICARE

## 2024-04-09 ENCOUNTER — OFFICE VISIT (OUTPATIENT)
Dept: PODIATRY | Facility: CLINIC | Age: 74
End: 2024-04-09
Payer: MEDICARE

## 2024-04-09 VITALS
DIASTOLIC BLOOD PRESSURE: 62 MMHG | WEIGHT: 197 LBS | OXYGEN SATURATION: 96 % | HEIGHT: 66 IN | HEART RATE: 85 BPM | SYSTOLIC BLOOD PRESSURE: 116 MMHG | BODY MASS INDEX: 31.66 KG/M2

## 2024-04-09 DIAGNOSIS — M79.671 FOOT PAIN, RIGHT: ICD-10-CM

## 2024-04-09 DIAGNOSIS — M20.41 HAMMER TOE OF RIGHT FOOT: ICD-10-CM

## 2024-04-09 DIAGNOSIS — E11.40 TYPE 2 DIABETES MELLITUS WITH DIABETIC NEUROPATHY, WITHOUT LONG-TERM CURRENT USE OF INSULIN: ICD-10-CM

## 2024-04-09 DIAGNOSIS — M20.11 HALLUX VALGUS, RIGHT: ICD-10-CM

## 2024-04-09 DIAGNOSIS — L84 FOOT CALLUS: ICD-10-CM

## 2024-04-09 DIAGNOSIS — L60.0 INGROWN TOENAIL: Primary | ICD-10-CM

## 2024-04-09 DIAGNOSIS — S99.921A PLANTAR PLATE INJURY, RIGHT, INITIAL ENCOUNTER: ICD-10-CM

## 2024-04-09 PROBLEM — E66.9 OBESITY: Status: ACTIVE | Noted: 2023-04-19

## 2024-04-09 PROBLEM — R20.0 NUMBNESS AND TINGLING IN BOTH HANDS: Status: ACTIVE | Noted: 2023-04-19

## 2024-04-09 PROBLEM — M51.36 DEGENERATION OF LUMBAR INTERVERTEBRAL DISC: Status: ACTIVE | Noted: 2023-04-19

## 2024-04-09 PROBLEM — F41.9 ANXIETY: Status: ACTIVE | Noted: 2024-02-21

## 2024-04-09 PROBLEM — E55.9 VITAMIN D DEFICIENCY: Status: ACTIVE | Noted: 2024-02-21

## 2024-04-09 PROBLEM — C22.1 CHOLANGIOCARCINOMA: Status: ACTIVE | Noted: 2023-04-27

## 2024-04-09 PROBLEM — N18.9 CHRONIC KIDNEY DISEASE: Status: ACTIVE | Noted: 2024-02-21

## 2024-04-09 PROBLEM — R20.2 NUMBNESS AND TINGLING IN BOTH HANDS: Status: ACTIVE | Noted: 2023-04-19

## 2024-04-09 PROBLEM — Z89.529: Status: ACTIVE | Noted: 2022-07-21

## 2024-04-09 PROBLEM — M19.90 ARTHRITIS: Status: ACTIVE | Noted: 2024-04-09

## 2024-04-09 PROBLEM — I65.29 CAROTID STENOSIS: Status: ACTIVE | Noted: 2022-09-20

## 2024-04-09 PROBLEM — K21.9 GERD (GASTROESOPHAGEAL REFLUX DISEASE): Status: ACTIVE | Noted: 2022-09-20

## 2024-04-09 PROBLEM — D50.0 IRON DEFICIENCY ANEMIA DUE TO CHRONIC BLOOD LOSS: Status: ACTIVE | Noted: 2023-04-19

## 2024-04-09 PROBLEM — D86.9 SARCOIDOSIS: Status: ACTIVE | Noted: 2024-02-21

## 2024-04-09 PROBLEM — M51.369 DEGENERATION OF LUMBAR INTERVERTEBRAL DISC: Status: ACTIVE | Noted: 2023-04-19

## 2024-04-09 PROBLEM — M48.061 LUMBAR SPINAL STENOSIS: Status: ACTIVE | Noted: 2023-04-19

## 2024-04-09 PROBLEM — E78.5 DYSLIPIDEMIA: Status: ACTIVE | Noted: 2023-11-16

## 2024-04-09 PROBLEM — G25.81 RESTLESS LEGS SYNDROME: Status: ACTIVE | Noted: 2024-02-21

## 2024-04-09 RX ORDER — ONDANSETRON 4 MG/1
4 TABLET, FILM COATED ORAL EVERY 8 HOURS PRN
COMMUNITY
Start: 2024-03-05

## 2024-04-09 RX ORDER — LIDOCAINE HYDROCHLORIDE 20 MG/ML
5 INJECTION, SOLUTION INFILTRATION; PERINEURAL ONCE
Status: COMPLETED | OUTPATIENT
Start: 2024-04-09 | End: 2024-04-09

## 2024-04-09 RX ORDER — CITALOPRAM 20 MG/1
20 TABLET ORAL DAILY
COMMUNITY

## 2024-04-09 RX ADMIN — LIDOCAINE HYDROCHLORIDE 5 ML: 20 INJECTION, SOLUTION INFILTRATION; PERINEURAL at 10:44

## 2024-04-24 NOTE — PROGRESS NOTES
[facility] - PODIATRY    Today's Date: 04/30/2024     Patient Name: Radha Rodriguez  MRN: [id number]  CSN: [id number]  PCP: Crow Landa MD  Referring Provider: No ref. provider found    SUBJECTIVE     Chief Complaint   Patient presents with    Follow-up     Crow Landa 10/16/2023 3 WK RECHECK- pt states here for right foot toenail.     Diabetes     Last office visit pcp 02/2024     HPI: Radha Rodriguez, a 74 y.o.female, comes to clinic as a(n) established patient presenting for diabetic foot exam and complaining of foot pain. Patient has h/o anxiety, arthritis, carotid bruit, CKD, claustrophobia, DM2, GERD, HLD, HTN, sarcoidosis . Patient is NIDDM and unsure of last BG level.  Relates minimal numbness and tingling in her feet.  Denies open wounds or sores.  States that her nail avulsion site has healed uneventfully.  Notes a bunion and hammertoe of the right foot that has slowly worsened over time.  She has previously seen podiatry in [place] but ultimately not follow-up for further evaluation.  She is currently undergoing recovery from a right knee replacement and utilizing a walker.  States that she has attempted several different courses of conservative treatment for her bunion with minimal to no relief.  She is now interested in surgical intervention but after she has recovered from her knee surgery.   Admits pain at 3/10 level and described as aching, nagging, and dull. Relates previous treatment(s) including wider shoes, padding . Denies any constitutional symptoms. No other pedal complaints at this time.    Past Medical History:   Diagnosis Date    Anemia     Anxiety 2022    Arthritis 2010    Knees and hips    Carotid bruit     Chronic kidney disease 2016    See kidney specialist yearly    Claustrophobia 2001    Diabetes mellitus     GERD (gastroesophageal reflux disease)     Hyperlipidemia     Hypertension     Sarcoidosis      Past Surgical History:   Procedure Laterality  Date    ANKLE SURGERY Left     fracture repair w/ two screws    COLONOSCOPY      COLONOSCOPY N/A 03/23/2021    Procedure: COLONOSCOPY WITH ANESTHESIA;  Surgeon: Francis Elmore DO;  Location: [facility] ENDOSCOPY;  Service: Gastroenterology;  Laterality: N/A;  pre: hx of colon polyps  post: normal  Joceline Murguia H, APRN    TOTAL KNEE ARTHROPLASTY Right      Family History   Problem Relation Age of Onset    Cancer Mother         colon    Colon cancer Mother     Diabetes Brother     Atrial fibrillation Brother     Esophageal cancer Neg Hx      Social History     Socioeconomic History    Marital status:    Tobacco Use    Smoking status: Never     Passive exposure: Never    Smokeless tobacco: Never   Vaping Use    Vaping status: Never Used   Substance and Sexual Activity    Alcohol use: No    Drug use: No    Sexual activity: Never     No Known Allergies  Current Outpatient Medications   Medication Sig Dispense Refill    acetaminophen (TYLENOL) 650 MG 8 hr tablet Take 350 mg by mouth Every 8 (Eight) Hours As Needed for Mild Pain. Rapid release PRn      aspirin 81 MG EC tablet       atorvastatin (LIPITOR) 10 MG tablet Take 1 tablet by mouth Daily.      Coenzyme Q10 (Co Q 10) 100 MG capsule Take 200 mg by mouth Daily.      cyclobenzaprine (FLEXERIL) 5 MG tablet Take 1 tablet by mouth 2 (Two) Times a Day As Needed.      estradiol (ESTRACE) 0.1 MG/GM vaginal cream estradiol 0.01% (0.1 mg/gram) vaginal cream      levocetirizine (XYZAL) 5 MG tablet Take 1 tablet by mouth As Needed for Allergies.      losartan (COZAAR) 100 MG tablet Take 1 tablet by mouth Daily.      metFORMIN (GLUCOPHAGE) 500 MG tablet Take 1 tablet by mouth 2 (Two) Times a Day With Meals.      metoprolol succinate XL (TOPROL-XL) 25 MG 24 hr tablet Take 1 tablet by mouth 2 (Two) Times a Day.      mycophenolate (CELLCEPT) 500 MG tablet Take 1 tablet by mouth 4 (Four) Times a Day.      omeprazole (priLOSEC) 20 MG capsule Take 1 capsule by mouth Daily.       ondansetron (ZOFRAN) 4 MG tablet Take 1 tablet by mouth Every 8 (Eight) Hours As Needed for Nausea or Vomiting.      rOPINIRole (REQUIP) 1 MG tablet Take 1 tablet by mouth Every Night.      tolterodine LA (DETROL LA) 4 MG 24 hr capsule Take 1 capsule by mouth Every Night.      vitamin B-12 (CYANOCOBALAMIN) 500 MCG tablet Take 1 tablet by mouth Every Other Day.      citalopram (CeleXA) 20 MG tablet Take 1 tablet by mouth Daily.      HYDROcodone-acetaminophen (NORCO) 5-325 MG per tablet Take 1 tablet by mouth As Needed.      hydrOXYzine pamoate (VISTARIL) 25 MG capsule Take 1 capsule by mouth As Needed. (Patient not taking: Reported on 4/9/2024)       No current facility-administered medications for this visit.     Review of Systems   Constitutional:  Negative for chills and fever.   HENT:  Negative for congestion.    Respiratory:  Negative for shortness of breath.    Cardiovascular:  Positive for leg swelling. Negative for chest pain.   Gastrointestinal:  Negative for constipation, diarrhea, nausea and vomiting.   Musculoskeletal:  Positive for arthralgias and gait problem.        Foot pain   Skin:  Negative for wound.   Psychiatric/Behavioral:  Negative for agitation.        OBJECTIVE     Vitals:    04/30/24 1044   BP: 138/82   Pulse: 90   Resp: 19   SpO2: 98%         PHYSICAL EXAM  GEN:   Accompanied by .     Physical Exam  Vitals reviewed.   Constitutional:       Appearance: Normal appearance. She is well-developed.   HENT:      Head: Normocephalic and atraumatic.      Right Ear: Tympanic membrane normal.      Left Ear: Tympanic membrane normal.      Nose: Nose normal.      Mouth/Throat:      Pharynx: Oropharynx is clear.   Eyes:      Extraocular Movements: Extraocular movements intact.      Pupils: Pupils are equal, round, and reactive to light.   Cardiovascular:      Rate and Rhythm: Normal rate and regular rhythm.      Pulses: Normal pulses.           Dorsalis pedis pulses are 2+ on the right side and 2+  on the left side.        Posterior tibial pulses are 2+ on the right side and 2+ on the left side.      Heart sounds: Normal heart sounds.   Pulmonary:      Effort: Pulmonary effort is normal.      Breath sounds: Normal breath sounds.   Abdominal:      General: Bowel sounds are normal.      Palpations: Abdomen is soft.   Musculoskeletal:      Cervical back: Normal range of motion and neck supple.      Right foot: Bunion present.   Feet:      Right foot:      Protective Sensation: 10 sites tested.        Skin integrity: Warmth present.      Left foot:      Protective Sensation: 10 sites tested.        Skin integrity: Warmth present.   Neurological:      General: No focal deficit present.      Mental Status: She is alert and oriented to person, place, and time. Mental status is at baseline.   Psychiatric:         Mood and Affect: Mood normal.         Behavior: Behavior normal.         Thought Content: Thought content normal.         Judgment: Judgment normal.          Foot/Ankle Exam    GENERAL  Appearance:  appears stated age  Orientation:  AAOx3  Affect:  appropriate  Gait:  antalgic  Assistance:  cane use  Right shoe gear: casual shoe  Left shoe gear: casual shoe    VASCULAR     Right Foot Vascularity   Dorsalis pedis:  2+  Posterior tibial:  2+  Skin temperature:  warm  Edema gradin+ and pitting  CFT:  3  Pedal hair growth:  Present  Varicosities:  spider veins     Left Foot Vascularity   Dorsalis pedis:  2+  Posterior tibial:  2+  Skin temperature:  warm  Edema gradin+ and pitting  CFT:  3  Pedal hair growth:  Present  Varicosities:  spider veins     NEUROLOGIC     Right Foot Neurologic   Light touch sensation: diminished  Vibratory sensation: diminished  Hot/Cold sensation: diminished  Protective Sensation using [place]-Valente Monofilament:   Sites intact: 10  Sites tested: 10     Left Foot Neurologic   Light touch sensation: diminished  Vibratory sensation: diminished  Hot/Cold sensation:   diminished  Protective Sensation using [place]-Valente Monofilament:   Sites intact: 10  Sites tested: 10    MUSCULOSKELETAL     Right Foot Musculoskeletal   Ecchymosis:  none  Tenderness:  MTP 1 dorsal tenderness, MTP 2 dorsal tenderness and toe 2 tenderness    Arch:  Normal  Hammertoe:  Second toe  Hallux valgus: Yes    Hallux limitus: Yes       Left Foot Musculoskeletal   Ecchymosis:  none  Tenderness:  none  Arch:  Normal    MUSCLE STRENGTH     Right Foot Muscle Strength   Foot dorsiflexion:  5  Foot plantar flexion:  5  Foot inversion:  5  Foot eversion:  5     Left Foot Muscle Strength   Foot dorsiflexion:  5  Foot plantar flexion:  5  Foot inversion:  5  Foot eversion:  5    RANGE OF MOTION     Right Foot Range of Motion   Foot and ankle ROM within normal limits       Left Foot Range of Motion   Foot and ankle ROM within normal limits      DERMATOLOGIC      Right Foot Dermatologic   Skin  Positive for corn.      Left Foot Dermatologic   Skin  Left foot skin is intact.     Image:       RADIOLOGY/NUCLEAR:  XR Foot 3+ View Right    Result Date: 4/30/2024  Narrative: XR FOOT 3+ VW RIGHT- 4/30/2024 11:06 AM  HISTORY: bunion; M79.671-Pain in right foot  COMPARISON: None available  FINDINGS: Frontal, lateral and oblique radiographs of the foot were provided for review.  There is a moderate calcaneal spur and enthesophyte at the posterior calcaneus. No ankle joint effusion is seen. There is a moderate hallux valgus deformity. There is osteopenia. No acute fracture or cortical irregularity is seen. There is degenerative change of the first MTP joint.      Impression:  1. Osteopenia with moderate calcaneal spur and enthesophyte at the calcaneus. 2. Moderate hallux valgus deformity and mild degenerative change at the first MTP joint.  This report was signed and finalized on 4/30/2024 12:38 PM by Fam Jin.       LABORATORY/CULTURE RESULTS:      PATHOLOGY RESULTS:       ASSESSMENT/PLAN     Diagnoses and all  orders for this visit:    1. Hallux valgus, right (Primary)  -     Case Request; Standing  -     CBC & Differential; Future  -     Basic Metabolic Panel; Future  -     ECG 12 Lead; Future  -     XR chest 2 vw; Future  -     ceFAZolin (ANCEF) 2,000 mg in sodium chloride 0.9 % 100 mL IVPB  -     Case Request    2. Hammer toe of right foot  -     Case Request; Standing  -     CBC & Differential; Future  -     Basic Metabolic Panel; Future  -     ECG 12 Lead; Future  -     XR chest 2 vw; Future  -     ceFAZolin (ANCEF) 2,000 mg in sodium chloride 0.9 % 100 mL IVPB  -     Case Request    3. Plantar plate injury, right, initial encounter  -     Case Request; Standing  -     CBC & Differential; Future  -     Basic Metabolic Panel; Future  -     ECG 12 Lead; Future  -     XR chest 2 vw; Future  -     ceFAZolin (ANCEF) 2,000 mg in sodium chloride 0.9 % 100 mL IVPB  -     Case Request    4. Foot pain, right    5. Type 2 diabetes mellitus with diabetic neuropathy, without long-term current use of insulin    Other orders  -     Follow Anesthesia Guidelines / Protocol; Future  -     Follow Anesthesia Guidelines / Protocol; Standing  -     Obtain Informed Consent; Future  -     Provide Instructions to Patient Regarding NPO Status; Future  -     Chlorhexidine Skin Prep - Educate and Review With Patient; Future  -     Verify NPO Status; Standing  -     Obtain Informed Consent (If Not Done Inpatient or PAT); Standing  -     Instructions on coughing, deep breathing, and incentive spirometry.; Standing  -     Notify Provider - Standard; Standing  -     Provide NPO Instructions to Patient        Comprehensive lower extremity examination and evaluation was performed.  Discussed findings and treatment plan including risks, benefits, and treatment options with patient in detail. Patient agreed with treatment plan.  Reviewed at home diabetic foot care including daily foot checks '  Avulsion site has healed uneventfully.  Reviewed imaging  to confirm clinical diagnoses.  Discussed ongoing conservative treatments versus surgical intervention.  Patient feels that she has exhausted conservative therapy and request surgical intervention.  I believe the best course of action would be to proceed with 1st Metatarsophalangeal Joint Arthrodesis, Bone Graft Rosebud, 2nd Hammertoe Repair, 2nd Plantar Plate Repair - Right Foot.  Patient is in agreement.  All options, benefits, and risks associate with surgery have been discussed with the patient including but not limited to: Standard risk of anesthesia, pain, bleeding, infection, nonhealing/dehiscence, nonunion, malunion, deformity, loss of limb or life.  Pre and postoperative course were discussed in detail.  No guarantees were inferred.  An After Visit Summary was printed and given to the patient at discharge, including (if requested) any available informative/educational handouts regarding diagnosis, treatment, or medications. All questions were answered to patient/family satisfaction. Should symptoms fail to improve or worsen they agree to call or return to clinic or to go to the Emergency Department. Discussed the importance of following up with any needed screening tests/labs/specialist appointments and any requested follow-up recommended by me today. Importance of maintaining follow-up discussed and patient accepts that missed appointments can delay diagnosis and potentially lead to worsening of conditions.  Return for Post-Op appointment., or sooner if acute issues arise.      Procedures     This document has been electronically signed by Dakota Vasquez DPM on May 1, 2024 16:22 CDT

## 2024-04-29 ENCOUNTER — TELEPHONE (OUTPATIENT)
Dept: PODIATRY | Facility: CLINIC | Age: 74
End: 2024-04-29
Payer: MEDICARE

## 2024-04-29 NOTE — TELEPHONE ENCOUNTER
Hub to relay  Spoke with patient regarding appt on 04/30/2024. Patient confirmed date and time off appt.

## 2024-04-30 ENCOUNTER — HOSPITAL ENCOUNTER (OUTPATIENT)
Dept: GENERAL RADIOLOGY | Facility: HOSPITAL | Age: 74
Discharge: HOME | End: 2024-04-30
Admitting: PODIATRIST
Payer: MEDICARE

## 2024-04-30 ENCOUNTER — OFFICE VISIT (OUTPATIENT)
Dept: PODIATRY | Facility: CLINIC | Age: 74
End: 2024-04-30
Payer: MEDICARE

## 2024-04-30 VITALS
BODY MASS INDEX: 31.95 KG/M2 | HEART RATE: 90 BPM | WEIGHT: 198.8 LBS | SYSTOLIC BLOOD PRESSURE: 138 MMHG | HEIGHT: 66 IN | OXYGEN SATURATION: 98 % | RESPIRATION RATE: 19 BRPM | DIASTOLIC BLOOD PRESSURE: 82 MMHG

## 2024-04-30 DIAGNOSIS — S99.921A PLANTAR PLATE INJURY, RIGHT, INITIAL ENCOUNTER: ICD-10-CM

## 2024-04-30 DIAGNOSIS — M79.671 FOOT PAIN, RIGHT: ICD-10-CM

## 2024-04-30 DIAGNOSIS — M20.11 HALLUX VALGUS, RIGHT: Primary | ICD-10-CM

## 2024-04-30 DIAGNOSIS — E11.40 TYPE 2 DIABETES MELLITUS WITH DIABETIC NEUROPATHY, WITHOUT LONG-TERM CURRENT USE OF INSULIN: ICD-10-CM

## 2024-04-30 DIAGNOSIS — M20.41 HAMMER TOE OF RIGHT FOOT: ICD-10-CM

## 2024-04-30 PROCEDURE — 99214 OFFICE O/P EST MOD 30 MIN: CPT | Performed by: PODIATRIST

## 2024-04-30 PROCEDURE — 3079F DIAST BP 80-89 MM HG: CPT | Performed by: PODIATRIST

## 2024-04-30 PROCEDURE — 3075F SYST BP GE 130 - 139MM HG: CPT | Performed by: PODIATRIST

## 2024-04-30 PROCEDURE — 73630 X-RAY EXAM OF FOOT: CPT

## 2024-05-01 NOTE — H&P
[facility] - PODIATRY    Today's Date: 04/30/2024     Patient Name: Radha Rodriguez  MRN: [id number]  CSN: [id number]  PCP: Crow Landa MD  Referring Provider: No ref. provider found    SUBJECTIVE     Chief Complaint   Patient presents with    Follow-up     Crow Landa 10/16/2023 3 WK RECHECK- pt states here for right foot toenail.     Diabetes     Last office visit pcp 02/2024     HPI: Radha Rodriguez, a 74 y.o.female, comes to clinic as a(n) established patient presenting for diabetic foot exam and complaining of foot pain. Patient has h/o anxiety, arthritis, carotid bruit, CKD, claustrophobia, DM2, GERD, HLD, HTN, sarcoidosis . Patient is NIDDM and unsure of last BG level.  Relates minimal numbness and tingling in her feet.  Denies open wounds or sores.  States that her nail avulsion site has healed uneventfully.  Notes a bunion and hammertoe of the right foot that has slowly worsened over time.  She has previously seen podiatry in [place] but ultimately not follow-up for further evaluation.  She is currently undergoing recovery from a right knee replacement and utilizing a walker.  States that she has attempted several different courses of conservative treatment for her bunion with minimal to no relief.  She is now interested in surgical intervention but after she has recovered from her knee surgery.   Admits pain at 3/10 level and described as aching, nagging, and dull. Relates previous treatment(s) including wider shoes, padding . Denies any constitutional symptoms. No other pedal complaints at this time.    Past Medical History:   Diagnosis Date    Anemia     Anxiety 2022    Arthritis 2010    Knees and hips    Carotid bruit     Chronic kidney disease 2016    See kidney specialist yearly    Claustrophobia 2001    Diabetes mellitus     GERD (gastroesophageal reflux disease)     Hyperlipidemia     Hypertension     Sarcoidosis      Past Surgical History:   Procedure Laterality  Date    ANKLE SURGERY Left     fracture repair w/ two screws    COLONOSCOPY      COLONOSCOPY N/A 03/23/2021    Procedure: COLONOSCOPY WITH ANESTHESIA;  Surgeon: Francis Elmore DO;  Location: [facility] ENDOSCOPY;  Service: Gastroenterology;  Laterality: N/A;  pre: hx of colon polyps  post: normal  Joceline Murguia H, APRN    TOTAL KNEE ARTHROPLASTY Right      Family History   Problem Relation Age of Onset    Cancer Mother         colon    Colon cancer Mother     Diabetes Brother     Atrial fibrillation Brother     Esophageal cancer Neg Hx      Social History     Socioeconomic History    Marital status:    Tobacco Use    Smoking status: Never     Passive exposure: Never    Smokeless tobacco: Never   Vaping Use    Vaping status: Never Used   Substance and Sexual Activity    Alcohol use: No    Drug use: No    Sexual activity: Never     No Known Allergies  Current Outpatient Medications   Medication Sig Dispense Refill    acetaminophen (TYLENOL) 650 MG 8 hr tablet Take 350 mg by mouth Every 8 (Eight) Hours As Needed for Mild Pain. Rapid release PRn      aspirin 81 MG EC tablet       atorvastatin (LIPITOR) 10 MG tablet Take 1 tablet by mouth Daily.      Coenzyme Q10 (Co Q 10) 100 MG capsule Take 200 mg by mouth Daily.      cyclobenzaprine (FLEXERIL) 5 MG tablet Take 1 tablet by mouth 2 (Two) Times a Day As Needed.      estradiol (ESTRACE) 0.1 MG/GM vaginal cream estradiol 0.01% (0.1 mg/gram) vaginal cream      levocetirizine (XYZAL) 5 MG tablet Take 1 tablet by mouth As Needed for Allergies.      losartan (COZAAR) 100 MG tablet Take 1 tablet by mouth Daily.      metFORMIN (GLUCOPHAGE) 500 MG tablet Take 1 tablet by mouth 2 (Two) Times a Day With Meals.      metoprolol succinate XL (TOPROL-XL) 25 MG 24 hr tablet Take 1 tablet by mouth 2 (Two) Times a Day.      mycophenolate (CELLCEPT) 500 MG tablet Take 1 tablet by mouth 4 (Four) Times a Day.      omeprazole (priLOSEC) 20 MG capsule Take 1 capsule by mouth Daily.       ondansetron (ZOFRAN) 4 MG tablet Take 1 tablet by mouth Every 8 (Eight) Hours As Needed for Nausea or Vomiting.      rOPINIRole (REQUIP) 1 MG tablet Take 1 tablet by mouth Every Night.      tolterodine LA (DETROL LA) 4 MG 24 hr capsule Take 1 capsule by mouth Every Night.      vitamin B-12 (CYANOCOBALAMIN) 500 MCG tablet Take 1 tablet by mouth Every Other Day.      citalopram (CeleXA) 20 MG tablet Take 1 tablet by mouth Daily.      HYDROcodone-acetaminophen (NORCO) 5-325 MG per tablet Take 1 tablet by mouth As Needed.      hydrOXYzine pamoate (VISTARIL) 25 MG capsule Take 1 capsule by mouth As Needed. (Patient not taking: Reported on 4/9/2024)       No current facility-administered medications for this visit.     Review of Systems   Constitutional:  Negative for chills and fever.   HENT:  Negative for congestion.    Respiratory:  Negative for shortness of breath.    Cardiovascular:  Positive for leg swelling. Negative for chest pain.   Gastrointestinal:  Negative for constipation, diarrhea, nausea and vomiting.   Musculoskeletal:  Positive for arthralgias and gait problem.        Foot pain   Skin:  Negative for wound.   Psychiatric/Behavioral:  Negative for agitation.        OBJECTIVE     Vitals:    04/30/24 1044   BP: 138/82   Pulse: 90   Resp: 19   SpO2: 98%         PHYSICAL EXAM  GEN:   Accompanied by .     Physical Exam  Vitals reviewed.   Constitutional:       Appearance: Normal appearance. She is well-developed.   HENT:      Head: Normocephalic and atraumatic.      Right Ear: Tympanic membrane normal.      Left Ear: Tympanic membrane normal.      Nose: Nose normal.      Mouth/Throat:      Pharynx: Oropharynx is clear.   Eyes:      Extraocular Movements: Extraocular movements intact.      Pupils: Pupils are equal, round, and reactive to light.   Cardiovascular:      Rate and Rhythm: Normal rate and regular rhythm.      Pulses: Normal pulses.           Dorsalis pedis pulses are 2+ on the right side and 2+  on the left side.        Posterior tibial pulses are 2+ on the right side and 2+ on the left side.      Heart sounds: Normal heart sounds.   Pulmonary:      Effort: Pulmonary effort is normal.      Breath sounds: Normal breath sounds.   Abdominal:      General: Bowel sounds are normal.      Palpations: Abdomen is soft.   Musculoskeletal:      Cervical back: Normal range of motion and neck supple.      Right foot: Bunion present.   Feet:      Right foot:      Protective Sensation: 10 sites tested.        Skin integrity: Warmth present.      Left foot:      Protective Sensation: 10 sites tested.        Skin integrity: Warmth present.   Neurological:      General: No focal deficit present.      Mental Status: She is alert and oriented to person, place, and time. Mental status is at baseline.   Psychiatric:         Mood and Affect: Mood normal.         Behavior: Behavior normal.         Thought Content: Thought content normal.         Judgment: Judgment normal.          Foot/Ankle Exam    GENERAL  Appearance:  appears stated age  Orientation:  AAOx3  Affect:  appropriate  Gait:  antalgic  Assistance:  cane use  Right shoe gear: casual shoe  Left shoe gear: casual shoe    VASCULAR     Right Foot Vascularity   Dorsalis pedis:  2+  Posterior tibial:  2+  Skin temperature:  warm  Edema gradin+ and pitting  CFT:  3  Pedal hair growth:  Present  Varicosities:  spider veins     Left Foot Vascularity   Dorsalis pedis:  2+  Posterior tibial:  2+  Skin temperature:  warm  Edema gradin+ and pitting  CFT:  3  Pedal hair growth:  Present  Varicosities:  spider veins     NEUROLOGIC     Right Foot Neurologic   Light touch sensation: diminished  Vibratory sensation: diminished  Hot/Cold sensation: diminished  Protective Sensation using [place] Monofilament:   Sites intact: 10  Sites tested: 10     Left Foot Neurologic   Light touch sensation: diminished  Vibratory sensation: diminished  Hot/Cold sensation:   diminished  Protective Sensation using [place] Monofilament:   Sites intact: 10  Sites tested: 10    MUSCULOSKELETAL     Right Foot Musculoskeletal   Ecchymosis:  none  Tenderness:  MTP 1 dorsal tenderness, MTP 2 dorsal tenderness and toe 2 tenderness    Arch:  Normal  Hammertoe:  Second toe  Hallux valgus: Yes    Hallux limitus: Yes       Left Foot Musculoskeletal   Ecchymosis:  none  Tenderness:  none  Arch:  Normal    MUSCLE STRENGTH     Right Foot Muscle Strength   Foot dorsiflexion:  5  Foot plantar flexion:  5  Foot inversion:  5  Foot eversion:  5     Left Foot Muscle Strength   Foot dorsiflexion:  5  Foot plantar flexion:  5  Foot inversion:  5  Foot eversion:  5    RANGE OF MOTION     Right Foot Range of Motion   Foot and ankle ROM within normal limits       Left Foot Range of Motion   Foot and ankle ROM within normal limits      DERMATOLOGIC      Right Foot Dermatologic   Skin  Positive for corn.      Left Foot Dermatologic   Skin  Left foot skin is intact.     Image:       RADIOLOGY/NUCLEAR:  XR Foot 3+ View Right    Result Date: 4/30/2024  Narrative: XR FOOT 3+ VW RIGHT- 4/30/2024 11:06 AM  HISTORY: bunion; M79.671-Pain in right foot  COMPARISON: None available  FINDINGS: Frontal, lateral and oblique radiographs of the foot were provided for review.  There is a moderate calcaneal spur and enthesophyte at the posterior calcaneus. No ankle joint effusion is seen. There is a moderate hallux valgus deformity. There is osteopenia. No acute fracture or cortical irregularity is seen. There is degenerative change of the first MTP joint.      Impression:  1. Osteopenia with moderate calcaneal spur and enthesophyte at the calcaneus. 2. Moderate hallux valgus deformity and mild degenerative change at the first MTP joint.  This report was signed and finalized on 4/30/2024 12:38 PM by Fam Jin.       LABORATORY/CULTURE RESULTS:      PATHOLOGY RESULTS:       ASSESSMENT/PLAN     Diagnoses and all  orders for this visit:    1. Hallux valgus, right (Primary)  -     Case Request; Standing  -     CBC & Differential; Future  -     Basic Metabolic Panel; Future  -     ECG 12 Lead; Future  -     XR chest 2 vw; Future  -     ceFAZolin (ANCEF) 2,000 mg in sodium chloride 0.9 % 100 mL IVPB  -     Case Request    2. Hammer toe of right foot  -     Case Request; Standing  -     CBC & Differential; Future  -     Basic Metabolic Panel; Future  -     ECG 12 Lead; Future  -     XR chest 2 vw; Future  -     ceFAZolin (ANCEF) 2,000 mg in sodium chloride 0.9 % 100 mL IVPB  -     Case Request    3. Plantar plate injury, right, initial encounter  -     Case Request; Standing  -     CBC & Differential; Future  -     Basic Metabolic Panel; Future  -     ECG 12 Lead; Future  -     XR chest 2 vw; Future  -     ceFAZolin (ANCEF) 2,000 mg in sodium chloride 0.9 % 100 mL IVPB  -     Case Request    4. Foot pain, right    5. Type 2 diabetes mellitus with diabetic neuropathy, without long-term current use of insulin    Other orders  -     Follow Anesthesia Guidelines / Protocol; Future  -     Follow Anesthesia Guidelines / Protocol; Standing  -     Obtain Informed Consent; Future  -     Provide Instructions to Patient Regarding NPO Status; Future  -     Chlorhexidine Skin Prep - Educate and Review With Patient; Future  -     Verify NPO Status; Standing  -     Obtain Informed Consent (If Not Done Inpatient or PAT); Standing  -     Instructions on coughing, deep breathing, and incentive spirometry.; Standing  -     Notify Provider - Standard; Standing  -     Provide NPO Instructions to Patient        Comprehensive lower extremity examination and evaluation was performed.  Discussed findings and treatment plan including risks, benefits, and treatment options with patient in detail. Patient agreed with treatment plan.  Reviewed at home diabetic foot care including daily foot checks '  Avulsion site has healed uneventfully.  Reviewed imaging  to confirm clinical diagnoses.  Discussed ongoing conservative treatments versus surgical intervention.  Patient feels that she has exhausted conservative therapy and request surgical intervention.  I believe the best course of action would be to proceed with 1st Metatarsophalangeal Joint Arthrodesis, Bone Graft [place], 2nd Hammertoe Repair, 2nd Plantar Plate Repair - Right Foot.  Patient is in agreement.  All options, benefits, and risks associate with surgery have been discussed with the patient including but not limited to: Standard risk of anesthesia, pain, bleeding, infection, nonhealing/dehiscence, nonunion, malunion, deformity, loss of limb or life.  Pre and postoperative course were discussed in detail.  No guarantees were inferred.  An After Visit Summary was printed and given to the patient at discharge, including (if requested) any available informative/educational handouts regarding diagnosis, treatment, or medications. All questions were answered to patient/family satisfaction. Should symptoms fail to improve or worsen they agree to call or return to clinic or to go to the Emergency Department. Discussed the importance of following up with any needed screening tests/labs/specialist appointments and any requested follow-up recommended by me today. Importance of maintaining follow-up discussed and patient accepts that missed appointments can delay diagnosis and potentially lead to worsening of conditions.  Return for Post-Op appointment., or sooner if acute issues arise.      Procedures     This document has been electronically signed by Dakota Vasquez DPM on May 1, 2024 16:22 CDT

## 2024-05-02 ENCOUNTER — TELEPHONE (OUTPATIENT)
Dept: PODIATRY | Facility: CLINIC | Age: 74
End: 2024-05-02
Payer: MEDICARE

## 2024-05-02 PROBLEM — M20.11 HALLUX VALGUS, RIGHT: Status: ACTIVE | Noted: 2024-04-30

## 2024-05-02 PROBLEM — S99.921A PLANTAR PLATE INJURY, RIGHT, INITIAL ENCOUNTER: Status: ACTIVE | Noted: 2024-04-30

## 2024-05-02 PROBLEM — M20.41 HAMMER TOE OF RIGHT FOOT: Status: ACTIVE | Noted: 2024-04-30

## 2024-05-02 NOTE — TELEPHONE ENCOUNTER
Called patient and went over surgery information date time and instructions pt verbalized understanding.

## 2024-07-03 ENCOUNTER — PRE-ADMISSION TESTING (OUTPATIENT)
Dept: PREADMISSION TESTING | Facility: HOSPITAL | Age: 74
End: 2024-07-03
Payer: MEDICARE

## 2024-07-03 ENCOUNTER — HOSPITAL ENCOUNTER (OUTPATIENT)
Dept: GENERAL RADIOLOGY | Facility: HOSPITAL | Age: 74
Discharge: HOME | End: 2024-07-03
Payer: MEDICARE

## 2024-07-03 VITALS
RESPIRATION RATE: 20 BRPM | HEART RATE: 79 BPM | HEIGHT: 65 IN | OXYGEN SATURATION: 99 % | DIASTOLIC BLOOD PRESSURE: 83 MMHG | BODY MASS INDEX: 33.17 KG/M2 | WEIGHT: 199.08 LBS | SYSTOLIC BLOOD PRESSURE: 179 MMHG

## 2024-07-03 DIAGNOSIS — S99.921A PLANTAR PLATE INJURY, RIGHT, INITIAL ENCOUNTER: ICD-10-CM

## 2024-07-03 DIAGNOSIS — M20.41 HAMMER TOE OF RIGHT FOOT: ICD-10-CM

## 2024-07-03 DIAGNOSIS — M20.11 HALLUX VALGUS, RIGHT: ICD-10-CM

## 2024-07-03 LAB
ANION GAP SERPL CALCULATED.3IONS-SCNC: 11 MMOL/L (ref 5–15)
BASOPHILS # BLD AUTO: 0.02 10*3/MM3 (ref 0–0.2)
BASOPHILS NFR BLD AUTO: 0.4 % (ref 0–1.5)
BUN SERPL-MCNC: 24 MG/DL (ref 8–23)
BUN/CREAT SERPL: 26.4 (ref 7–25)
CALCIUM SPEC-SCNC: 9.4 MG/DL (ref 8.6–10.5)
CHLORIDE SERPL-SCNC: 106 MMOL/L (ref 98–107)
CO2 SERPL-SCNC: 23 MMOL/L (ref 22–29)
CREAT SERPL-MCNC: 0.91 MG/DL (ref 0.57–1)
DEPRECATED RDW RBC AUTO: 44.2 FL (ref 37–54)
EGFRCR SERPLBLD CKD-EPI 2021: 66.8 ML/MIN/1.73
EOSINOPHIL # BLD AUTO: 0.13 10*3/MM3 (ref 0–0.4)
EOSINOPHIL NFR BLD AUTO: 2.6 % (ref 0.3–6.2)
ERYTHROCYTE [DISTWIDTH] IN BLOOD BY AUTOMATED COUNT: 14.6 % (ref 12.3–15.4)
GLUCOSE SERPL-MCNC: 94 MG/DL (ref 65–99)
HCT VFR BLD AUTO: 34.9 % (ref 34–46.6)
HGB BLD-MCNC: 10.4 G/DL (ref 12–15.9)
IMM GRANULOCYTES # BLD AUTO: 0.01 10*3/MM3 (ref 0–0.05)
IMM GRANULOCYTES NFR BLD AUTO: 0.2 % (ref 0–0.5)
LYMPHOCYTES # BLD AUTO: 1.12 10*3/MM3 (ref 0.7–3.1)
LYMPHOCYTES NFR BLD AUTO: 22.7 % (ref 19.6–45.3)
MCH RBC QN AUTO: 24.8 PG (ref 26.6–33)
MCHC RBC AUTO-ENTMCNC: 29.8 G/DL (ref 31.5–35.7)
MCV RBC AUTO: 83.1 FL (ref 79–97)
MONOCYTES # BLD AUTO: 0.52 10*3/MM3 (ref 0.1–0.9)
MONOCYTES NFR BLD AUTO: 10.5 % (ref 5–12)
NEUTROPHILS NFR BLD AUTO: 3.13 10*3/MM3 (ref 1.7–7)
NEUTROPHILS NFR BLD AUTO: 63.6 % (ref 42.7–76)
NRBC BLD AUTO-RTO: 0 /100 WBC (ref 0–0.2)
PLATELET # BLD AUTO: 217 10*3/MM3 (ref 140–450)
PMV BLD AUTO: 9.2 FL (ref 6–12)
POTASSIUM SERPL-SCNC: 4.7 MMOL/L (ref 3.5–5.2)
RBC # BLD AUTO: 4.2 10*6/MM3 (ref 3.77–5.28)
SODIUM SERPL-SCNC: 140 MMOL/L (ref 136–145)
WBC NRBC COR # BLD AUTO: 4.93 10*3/MM3 (ref 3.4–10.8)

## 2024-07-03 PROCEDURE — 93005 ELECTROCARDIOGRAM TRACING: CPT

## 2024-07-03 PROCEDURE — 71046 X-RAY EXAM CHEST 2 VIEWS: CPT

## 2024-07-03 PROCEDURE — 85025 COMPLETE CBC W/AUTO DIFF WBC: CPT

## 2024-07-03 PROCEDURE — 80048 BASIC METABOLIC PNL TOTAL CA: CPT

## 2024-07-03 PROCEDURE — 36415 COLL VENOUS BLD VENIPUNCTURE: CPT

## 2024-07-03 RX ORDER — OXYBUTYNIN CHLORIDE 10 MG/1
10 TABLET, EXTENDED RELEASE ORAL DAILY
COMMUNITY

## 2024-07-03 RX ORDER — FERROUS SULFATE 324(65)MG
324 TABLET, DELAYED RELEASE (ENTERIC COATED) ORAL EVERY OTHER DAY
COMMUNITY

## 2024-07-03 RX ORDER — NITROFURANTOIN MACROCRYSTALS 100 MG/1
100 CAPSULE ORAL 2 TIMES DAILY
COMMUNITY

## 2024-07-03 NOTE — DISCHARGE INSTRUCTIONS
How to Use Chlorhexidine Before Surgery  Chlorhexidine gluconate (CHG) is a germ-killing (antiseptic) solution that is used to clean the skin. It can get rid of the bacteria that normally live on the skin and can keep them away for about 24 hours. To clean your skin with CHG, you may be given:  A CHG solution to use in the shower or as part of a sponge bath.  A prepackaged cloth that contains CHG.  Cleaning your skin with CHG may help lower the risk for infection:  While you are staying in the intensive care unit of the hospital.  If you have a vascular access, such as a central line, to provide short-term or long-term access to your veins.  If you have a catheter to drain urine from your bladder.  If you are on a ventilator. A ventilator is a machine that helps you breathe by moving air in and out of your lungs.  After surgery.  What are the risks?  Risks of using CHG include:  A skin reaction.  Hearing loss, if CHG gets in your ears and you have a perforated eardrum.  Eye injury, if CHG gets in your eyes and is not rinsed out.  The CHG product catching fire.  Make sure that you avoid smoking and flames after applying CHG to your skin.  Do not use CHG:  If you have a chlorhexidine allergy or have previously reacted to chlorhexidine.  On babies younger than 2 months of age.  How to use CHG solution  Use CHG only as told by your health care provider, and follow the instructions on the label.  Use the full amount of CHG as directed. Usually, this is one bottle.  During a shower    Follow these steps when using CHG solution during a shower (unless your health care provider gives you different instructions):  Start the shower.  Use your normal soap and shampoo to wash your face and hair.  Turn off the shower or move out of the shower stream.  Pour the CHG onto a clean washcloth. Do not use any type of brush or rough-edged sponge.  Starting at your neck, lather your body down to your toes. Make sure you follow these  instructions:  If you will be having surgery, pay special attention to the part of your body where you will be having surgery. Scrub this area for at least 1 minute.  Do not use CHG on your head or face. If the solution gets into your ears or eyes, rinse them well with water.  Avoid your genital area.  Avoid any areas of skin that have broken skin, cuts, or scrapes.  Scrub your back and under your arms. Make sure to wash skin folds.  Let the lather sit on your skin for 1-2 minutes or as long as told by your health care provider.  Thoroughly rinse your entire body in the shower. Make sure that all body creases and crevices are rinsed well.  Dry off with a clean towel. Do not put any substances on your body afterward--such as powder, lotion, or perfume--unless you are told to do so by your health care provider. Only use lotions that are recommended by the .  Put on clean clothes or pajamas.  If it is the night before your surgery, sleep in clean sheets.     During a sponge bath  Follow these steps when using CHG solution during a sponge bath (unless your health care provider gives you different instructions):  Use your normal soap and shampoo to wash your face and hair.  Pour the CHG onto a clean washcloth.  Starting at your neck, lather your body down to your toes. Make sure you follow these instructions:  If you will be having surgery, pay special attention to the part of your body where you will be having surgery. Scrub this area for at least 1 minute.  Do not use CHG on your head or face. If the solution gets into your ears or eyes, rinse them well with water.  Avoid your genital area.  Avoid any areas of skin that have broken skin, cuts, or scrapes.  Scrub your back and under your arms. Make sure to wash skin folds.  Let the lather sit on your skin for 1-2 minutes or as long as told by your health care provider.  Using a different clean, wet washcloth, thoroughly rinse your entire body. Make sure that  all body creases and crevices are rinsed well.  Dry off with a clean towel. Do not put any substances on your body afterward--such as powder, lotion, or perfume--unless you are told to do so by your health care provider. Only use lotions that are recommended by the .  Put on clean clothes or pajamas.  If it is the night before your surgery, sleep in clean sheets.  How to use CHG prepackaged cloths  Only use CHG cloths as told by your health care provider, and follow the instructions on the label.  Use the CHG cloth on clean, dry skin.  Do not use the CHG cloth on your head or face unless your health care provider tells you to.  When washing with the CHG cloth:  Avoid your genital area.  Avoid any areas of skin that have broken skin, cuts, or scrapes.  Before surgery    Follow these steps when using a CHG cloth to clean before surgery (unless your health care provider gives you different instructions):  Using the CHG cloth, vigorously scrub the part of your body where you will be having surgery. Scrub using a back-and-forth motion for 3 minutes. The area on your body should be completely wet with CHG when you are done scrubbing.  Do not rinse. Discard the cloth and let the area air-dry. Do not put any substances on the area afterward, such as powder, lotion, or perfume.  Put on clean clothes or pajamas.  If it is the night before your surgery, sleep in clean sheets.     For general bathing  Follow these steps when using CHG cloths for general bathing (unless your health care provider gives you different instructions).  Use a separate CHG cloth for each area of your body. Make sure you wash between any folds of skin and between your fingers and toes. Wash your body in the following order, switching to a new cloth after each step:  The front of your neck, shoulders, and chest.  Both of your arms, under your arms, and your hands.  Your stomach and groin area, avoiding the genitals.  Your right leg and  foot.  Your left leg and foot.  The back of your neck, your back, and your buttocks.  Do not rinse. Discard the cloth and let the area air-dry. Do not put any substances on your body afterward--such as powder, lotion, or perfume--unless you are told to do so by your health care provider. Only use lotions that are recommended by the .  Put on clean clothes or pajamas.  Contact a health care provider if:  Your skin gets irritated after scrubbing.  You have questions about using your solution or cloth.  You swallow any chlorhexidine. Call your local poison control center (1-720.185.4186 in the [place]  Get help right away if:  Your eyes itch badly, or they become very red or swollen.  Your skin itches badly and is red or swollen.  Your hearing changes.  You have trouble seeing.  You have swelling or tingling in your mouth or throat.  You have trouble breathing.  These symptoms may represent a serious problem that is an emergency. Do not wait to see if the symptoms will go away. Get medical help right away. Call your local emergency services (701 in the [place] Do not drive yourself to the hospital.  Summary  Chlorhexidine gluconate (CHG) is a germ-killing (antiseptic) solution that is used to clean the skin. Cleaning your skin with CHG may help to lower your risk for infection.  You may be given CHG to use for bathing. It may be in a bottle or in a prepackaged cloth to use on your skin. Carefully follow your health care provider's instructions and the instructions on the product label.  Do not use CHG if you have a chlorhexidine allergy.  Contact your health care provider if your skin gets irritated after scrubbing.  This information is not intended to replace advice given to you by your health care provider. Make sure you discuss any questions you have with your health care provider.  Document Revised: 04/17/2023 Document Reviewed: 02/28/2022  Elsevier Patient Education © 2023 [employer]      Preparing  for Surgery  Follow these instructions before the procedure:  Several days or weeks before your procedure  Medication(s) you need to stop   _______ days/week prior to surgery ***      Ask your health care provider about:  Changing or stopping your regular medicines. This is especially important if you are taking diabetes medicines or blood thinners.  Taking medicines such as aspirin and ibuprofen. These medicines can thin your blood. Do not take these medicines unless your health care provider tells you to take them.  Taking over-the-counter medicines, vitamins, herbs, and supplements.    Contact your surgeon if you:  Develop a fever of more than 100.4°F (38°C) or other feelings of illness during the 48 hours before your surgery.  Have symptoms that get worse.  Have questions or concerns about your surgery.  If you are going home the same day of your surgery you will need to arrange for a responsible adult, age 18 years old or older, to drive you home from the hospital and stay with you for 24 hours. Verification of the  will be made prior to any procedure requiring sedation. You may not go home in a taxi or any form of public transportation by yourself.     Day before your procedure  Medication(s) you need to stop the day before your surgery: losartan    24 hours before your procedure DO NOT drink alcoholic beverages or smoke.  24 hours before your procedure STOP taking Erectile Dysfunction medication (i.e.,Cialis, Viagra)   You may be asked to shower with a germ-killing soap.  Day of your procedure   You may take the following medication(s) the morning of surgery with a sip of water: tylenol, metoprolol, omeprazole      8 hours before your procedure STOP all food, any dairy products, and full liquids. This includes hard candy, chewing gum or mints. This is extremely important to prevent serious complications.     Up to 2 hours before your scheduled arrival time, you may have clear liquids no cream, powder,  or pulp of any kind. Safe options are water, black coffee, plain tea, soda, Gatorade/Powerade, clear broth, apple juice.    2 hours before your scheduled arrival time, STOP drinking clear liquids.    You may need to take another shower with a germ-killing soap before you leave home in the morning. Do not use perfumes, colognes, or body lotions.  Wear comfortable loose-fitting clothing.  Remove all jewelry including body piercing and rings, dark colored nail polish, and make up prior to arrival at the hospital. Leave all valuables at home.   Bring your hearing aids if you rely on them.  Do not wear contact lenses. If you wear eyeglasses remember to bring a case to store them in while you are in surgery.  Do not use denture adhesives since you will be asked to remove them during your surgery.    You do not need to bring your home medications into the hospital.   Bring your sleep apnea device with you on the day of your surgery (if this applies to you).  If you wear portable oxygen, bring it with you.   If you are staying overnight, you may bring a bag of items you may need such as slippers, robe and a change of clothes for your discharge. You may want to leave these items in the car until you are ready for them since your family will take your belongings when you leave the pre-operative area.  Arrive at the hospital as scheduled by the office. You will be asked to arrive 2 hours prior to your surgery time in order to prepare for your procedure.  When you arrive at the hospital  Go to the registration desk located at the main entrance of the hospital.  After registration is completed, you will be given a beeper and a sticker sheet. Take the stickers to Outpatient Surgery and place in the tray at the end of the desk to notify the staff that you have arrived and registered.   Return to the lobby to wait. You are not always called back according to the time of arrival but rather the time your doctor will be ready.  When  your beeper lights up and vibrates proceed through the double doors, under the stairs, and a member of the Outpatient Surgery staff will escort you to your preoperative room.

## 2024-07-06 LAB
QT INTERVAL: 408 MS
QTC INTERVAL: 417 MS

## 2024-07-16 NOTE — PROGRESS NOTES
Subjective    Ms. Rodriguez is 74 y.o. female    Chief Complaint: Urge incontinence    History of Present Illness    74-year-old female new patient referred for urinary incontinence.  Reports inability to make it to the bathroom in time.  Currently requiring 5-6 depends daily.  Previously tried and failed Detrol LA, Myrbetriq and as of most recent oxybutynin XL 10 mg.  Also with a new history over the last 2 years of recurrent urinary tract infections for which just finished off a round of Macrobid over the last couple weeks.    The following portions of the patient's history were reviewed and updated as appropriate: allergies, current medications, past family history, past medical history, past social history, past surgical history and problem list.    Review of Systems   Constitutional:  Negative for chills and fever.   Gastrointestinal:  Negative for abdominal pain, anal bleeding and blood in stool.   Genitourinary:  Positive for frequency and urgency. Negative for dysuria and hematuria.         Current Outpatient Medications:     acetaminophen (TYLENOL) 650 MG 8 hr tablet, Take 350 mg by mouth Every 8 (Eight) Hours As Needed for Mild Pain. Rapid release PRn, Disp: , Rfl:     aspirin 81 MG EC tablet, 1 tablet Daily., Disp: , Rfl:     atorvastatin (LIPITOR) 10 MG tablet, Take 1 tablet by mouth Daily., Disp: , Rfl:     Coenzyme Q10 (Co Q 10) 100 MG capsule, Take 200 mg by mouth Daily., Disp: , Rfl:     cyclobenzaprine (FLEXERIL) 5 MG tablet, Take 1 tablet by mouth 2 (Two) Times a Day As Needed., Disp: , Rfl:     estradiol (ESTRACE) 0.1 MG/GM vaginal cream, estradiol 0.01% (0.1 mg/gram) vaginal cream, Disp: , Rfl:     ferrous sulfate 324 (65 Fe) MG tablet delayed-release EC tablet, Take 1 tablet by mouth Every Other Day., Disp: , Rfl:     HYDROcodone-acetaminophen (NORCO) 5-325 MG per tablet, Take 1 tablet by mouth Every 12 (Twelve) Hours As Needed (states takes every now and then)., Disp: , Rfl:     levocetirizine  (XYZAL) 5 MG tablet, Take 1 tablet by mouth As Needed for Allergies., Disp: , Rfl:     losartan (COZAAR) 100 MG tablet, Take 1 tablet by mouth Daily., Disp: , Rfl:     metFORMIN (GLUCOPHAGE) 500 MG tablet, Take 1 tablet by mouth Daily With Breakfast., Disp: , Rfl:     metoprolol succinate XL (TOPROL-XL) 25 MG 24 hr tablet, Take 2 tablets by mouth 2 (Two) Times a Day., Disp: , Rfl:     mycophenolate (CELLCEPT) 500 MG tablet, Take 1 tablet by mouth 4 (Four) Times a Day., Disp: , Rfl:     omeprazole (priLOSEC) 20 MG capsule, Take 1 capsule by mouth Daily., Disp: , Rfl:     ondansetron (ZOFRAN) 4 MG tablet, Take 1 tablet by mouth Every 8 (Eight) Hours As Needed for Nausea or Vomiting., Disp: , Rfl:     rOPINIRole (REQUIP) 1 MG tablet, Take 1 tablet by mouth Every Night., Disp: , Rfl:     vitamin B-12 (CYANOCOBALAMIN) 500 MCG tablet, Take 1 tablet by mouth Every Other Day., Disp: , Rfl:     Past Medical History:   Diagnosis Date    Anemia     Anxiety 2022    Arthritis 2010    Knees and hips    Carotid bruit     Chronic kidney disease 2016    See kidney specialist yearly    Claustrophobia 2001    Diabetes mellitus     GERD (gastroesophageal reflux disease)     Hyperlipidemia     Hypertension     Sarcoidosis        Past Surgical History:   Procedure Laterality Date    ANKLE SURGERY Left     fracture repair w/ two screws    COLONOSCOPY      COLONOSCOPY N/A 03/23/2021    Procedure: COLONOSCOPY WITH ANESTHESIA;  Surgeon: Francis Elmore DO;  Location: [facility];  Service: Gastroenterology;  Laterality: N/A;  pre: hx of colon polyps  post: normal  Joceline Murguia, APRN    TOTAL KNEE ARTHROPLASTY Right     x3       Social History     Socioeconomic History    Marital status:    Tobacco Use    Smoking status: Never     Passive exposure: Never    Smokeless tobacco: Never   Vaping Use    Vaping status: Never Used   Substance and Sexual Activity    Alcohol use: No    Drug use: No    Sexual activity: Never        Family History   Problem Relation Age of Onset    Cancer Mother         colon    Colon cancer Mother     Diabetes Brother     Atrial fibrillation Brother     Esophageal cancer Neg Hx        Objective    There were no vitals taken for this visit.    Estimation of residual urine via abdominal ultrasound  Residual Urine: 197 ml  Indication: OAB  Position: Supine  Examination: Incremental scanning of the suprapubic area using 3 MHz transducer using copious amounts of acoustic gel.   Findings: An anechoic area was demonstrated which represented the bladder, with measurement of residual urine as noted. I inspected this myself. In that the residual urine was stable or insignificant, no treatment will be necessary at this time.      Assessment and Plan    Diagnoses and all orders for this visit:    1. Overactive bladder (Primary)  -     POC Urinalysis Dipstick, Multipro      Reports significant urge incontinence requiring 6 depends daily currently on oxybutynin XL 10 mg has seen no improvement in leakage    Based on postvoid residual today showing 197 mL patient now appears to be holding more urine than would like likely due to the oxybutynin usage    Will stop the oxybutynin and try patient on 6 weeks sample Marco.  Have also provided patient information regarding Botox, PTNS and sacral neurostimulator placement    Follow-up 6 weeks

## 2024-07-22 ENCOUNTER — OFFICE VISIT (OUTPATIENT)
Dept: UROLOGY | Facility: CLINIC | Age: 74
End: 2024-07-22
Payer: MEDICARE

## 2024-07-22 DIAGNOSIS — N32.81 OVERACTIVE BLADDER: Primary | ICD-10-CM

## 2024-07-22 LAB
BILIRUB BLD-MCNC: NEGATIVE MG/DL
CLARITY, POC: CLEAR
COLOR UR: YELLOW
GLUCOSE UR STRIP-MCNC: NEGATIVE MG/DL
KETONES UR QL: NEGATIVE
LEUKOCYTE EST, POC: NEGATIVE
NITRITE UR-MCNC: NEGATIVE MG/ML
PH UR: 5.5 [PH] (ref 5–8)
PROT UR STRIP-MCNC: NEGATIVE MG/DL
RBC # UR STRIP: NEGATIVE /UL
SP GR UR: 1.01 (ref 1–1.03)
UROBILINOGEN UR QL: NORMAL

## 2024-07-22 PROCEDURE — 1159F MED LIST DOCD IN RCRD: CPT

## 2024-07-22 PROCEDURE — 1160F RVW MEDS BY RX/DR IN RCRD: CPT

## 2024-07-22 PROCEDURE — 99214 OFFICE O/P EST MOD 30 MIN: CPT

## 2024-07-22 PROCEDURE — 81001 URINALYSIS AUTO W/SCOPE: CPT

## 2024-07-22 PROCEDURE — 51798 US URINE CAPACITY MEASURE: CPT

## 2024-08-08 ENCOUNTER — TELEPHONE (OUTPATIENT)
Dept: HEMATOLOGY | Age: 74
End: 2024-08-08

## 2024-08-08 NOTE — TELEPHONE ENCOUNTER
Called Patient and reminded patient of their appointment on 08/13/2024 and patient confirmed they would be here. Reminded patient to just come at appointment time, and to not come at the lab appointment time. Reminded patient that we will not check them in any more than 30 minutes before appointment time.  We have now moved to the [facility] that is located between our old office and the ER at the [facility]

## 2024-08-12 DIAGNOSIS — D86.89 GRANULOMA OF LIVER ASSOCIATED WITH SARCOIDOSIS: ICD-10-CM

## 2024-08-12 DIAGNOSIS — D63.1 ANEMIA IN STAGE 3 CHRONIC KIDNEY DISEASE, UNSPECIFIED WHETHER STAGE 3A OR 3B CKD (HCC): Primary | ICD-10-CM

## 2024-08-12 DIAGNOSIS — N18.30 ANEMIA IN STAGE 3 CHRONIC KIDNEY DISEASE, UNSPECIFIED WHETHER STAGE 3A OR 3B CKD (HCC): Primary | ICD-10-CM

## 2024-08-12 NOTE — PROGRESS NOTES
capsule Take 1 capsule by mouth daily      ondansetron (ZOFRAN-ODT) 4 MG disintegrating tablet Place 2 tablets under the tongue every 8 hours as needed for Nausea or Vomiting 21 tablet 2    HYDROcodone-acetaminophen (NORCO) 5-325 MG per tablet Take 1 tablet by mouth every 6 hours as needed for Pain.      acetaminophen (TYLENOL) 325 MG tablet Take 2 tablets by mouth every 8 hours as needed for Pain      vitamin B-12 (CYANOCOBALAMIN) 500 MCG tablet Take 1 tablet by mouth every 48 hours      aspirin EC 81 MG EC tablet Take 1 tablet by mouth in the morning and 1 tablet before bedtime. (Patient taking differently: Take 1 tablet by mouth daily) 60 tablet 0    estradiol (ESTRACE) 0.1 MG/GM vaginal cream Place 2 g vaginally daily Indications: Vaginitis      Probiotic Product (PROBIOTIC DAILY PO) Take 1 tablet by mouth daily Indications: Treatment for the Prevention of Constipation      omeprazole (PRILOSEC) 20 MG delayed release capsule Take 1 capsule by mouth daily Indications: Gastroesophageal Reflux Disease      atorvastatin (LIPITOR) 10 MG tablet Take 1 tablet by mouth every evening Indications: High Amount of Fats in the Blood      cyclobenzaprine (FLEXERIL) 5 MG tablet Take 1 tablet by mouth 2 times daily Indications: Restless Leg Syndrome      iron polysaccharides (NIFEREX) 150 MG capsule Take 150 mg by mouth in the morning. Indications: Anemia.      indapamide (LOZOL) 1.25 MG tablet Take 1.25 mg by mouth Daily Indications: Treatment with Diuretic Therapy      losartan (COZAAR) 100 MG tablet Take 1 tablet by mouth daily      metFORMIN (GLUCOPHAGE) 500 MG tablet Take 1 tablet by mouth every morning (before breakfast) Indications: Diabetes      rOPINIRole (REQUIP) 1 MG tablet Take 1 tablet by mouth nightly      tolterodine (DETROL LA) 4 MG extended release capsule Take 1 capsule by mouth nightly Indications: Bladder Spasm      levocetirizine (XYZAL) 5 MG tablet Take 1 tablet by mouth daily as needed Indications:

## 2024-08-13 ENCOUNTER — OFFICE VISIT (OUTPATIENT)
Dept: HEMATOLOGY | Age: 74
End: 2024-08-13
Payer: MEDICARE

## 2024-08-13 VITALS
HEART RATE: 100 BPM | BODY MASS INDEX: 32.28 KG/M2 | WEIGHT: 200 LBS | OXYGEN SATURATION: 100 % | SYSTOLIC BLOOD PRESSURE: 200 MMHG | DIASTOLIC BLOOD PRESSURE: 100 MMHG | RESPIRATION RATE: 16 BRPM

## 2024-08-13 DIAGNOSIS — D63.1 ANEMIA IN STAGE 3 CHRONIC KIDNEY DISEASE, UNSPECIFIED WHETHER STAGE 3A OR 3B CKD (HCC): Primary | ICD-10-CM

## 2024-08-13 DIAGNOSIS — E83.52 HYPERCALCEMIA DUE TO GRANULOMATOUS DISEASE (HCC): ICD-10-CM

## 2024-08-13 DIAGNOSIS — Z71.89 CARE PLAN DISCUSSED WITH PATIENT: ICD-10-CM

## 2024-08-13 DIAGNOSIS — N18.30 ANEMIA IN STAGE 3 CHRONIC KIDNEY DISEASE, UNSPECIFIED WHETHER STAGE 3A OR 3B CKD (HCC): Primary | ICD-10-CM

## 2024-08-13 DIAGNOSIS — D71 HYPERCALCEMIA DUE TO GRANULOMATOUS DISEASE (HCC): ICD-10-CM

## 2024-08-13 PROCEDURE — 3077F SYST BP >= 140 MM HG: CPT | Performed by: INTERNAL MEDICINE

## 2024-08-13 PROCEDURE — 3080F DIAST BP >= 90 MM HG: CPT | Performed by: INTERNAL MEDICINE

## 2024-08-13 PROCEDURE — 1036F TOBACCO NON-USER: CPT | Performed by: INTERNAL MEDICINE

## 2024-08-13 PROCEDURE — G8400 PT W/DXA NO RESULTS DOC: HCPCS | Performed by: INTERNAL MEDICINE

## 2024-08-13 PROCEDURE — G8417 CALC BMI ABV UP PARAM F/U: HCPCS | Performed by: INTERNAL MEDICINE

## 2024-08-13 PROCEDURE — 99213 OFFICE O/P EST LOW 20 MIN: CPT | Performed by: INTERNAL MEDICINE

## 2024-08-13 PROCEDURE — G8428 CUR MEDS NOT DOCUMENT: HCPCS | Performed by: INTERNAL MEDICINE

## 2024-08-13 PROCEDURE — 3017F COLORECTAL CA SCREEN DOC REV: CPT | Performed by: INTERNAL MEDICINE

## 2024-08-13 PROCEDURE — 1090F PRES/ABSN URINE INCON ASSESS: CPT | Performed by: INTERNAL MEDICINE

## 2024-08-13 PROCEDURE — 1123F ACP DISCUSS/DSCN MKR DOCD: CPT | Performed by: INTERNAL MEDICINE

## 2024-08-26 NOTE — PROGRESS NOTES
Subjective    Ms. Rodriguez is 74 y.o. female    Chief Complaint: Follow-up overactive bladder urge incontinence    History of Present Illness    74-year-old female established patient in for 6-week follow-up urge incontinence after starting on Gemtesa.  Previously tried and failed Detrol LA, Myrbetriq and oxybutynin.  Reports is down from 5-6 depends daily to 3.  Is also being treated for return again of urinary tract infection over this weekend where patient presented to outlying facility microscopic evaluation revealing 3+ bacteria.  No culture results available.  Currently on nitrofurantoin.      The following portions of the patient's history were reviewed and updated as appropriate: allergies, current medications, past family history, past medical history, past social history, past surgical history and problem list.    Review of Systems   Constitutional:  Negative for chills and fever.   Gastrointestinal:  Negative for abdominal pain, anal bleeding, blood in stool, nausea and vomiting.   Genitourinary:  Positive for dysuria, flank pain and urgency. Negative for hematuria.         Current Outpatient Medications:     acetaminophen (TYLENOL) 650 MG 8 hr tablet, Take 350 mg by mouth Every 8 (Eight) Hours As Needed for Mild Pain. Rapid release PRn, Disp: , Rfl:     aspirin 81 MG EC tablet, 1 tablet Daily., Disp: , Rfl:     atorvastatin (LIPITOR) 10 MG tablet, Take 1 tablet by mouth Daily., Disp: , Rfl:     Coenzyme Q10 (Co Q 10) 100 MG capsule, Take 200 mg by mouth Daily., Disp: , Rfl:     cyclobenzaprine (FLEXERIL) 5 MG tablet, Take 1 tablet by mouth 2 (Two) Times a Day As Needed., Disp: , Rfl:     estradiol (ESTRACE) 0.1 MG/GM vaginal cream, estradiol 0.01% (0.1 mg/gram) vaginal cream, Disp: , Rfl:     ferrous sulfate 324 (65 Fe) MG tablet delayed-release EC tablet, Take 1 tablet by mouth Every Other Day., Disp: , Rfl:     fluconazole (DIFLUCAN) 150 MG tablet, , Disp: , Rfl:     hydroCHLOROthiazide (MICROZIDE) 12.5 MG  capsule, , Disp: , Rfl:     HYDROcodone-acetaminophen (NORCO) 5-325 MG per tablet, Take 1 tablet by mouth Every 12 (Twelve) Hours As Needed (states takes every now and then)., Disp: , Rfl:     levocetirizine (XYZAL) 5 MG tablet, Take 1 tablet by mouth As Needed for Allergies., Disp: , Rfl:     losartan (COZAAR) 100 MG tablet, Take 1 tablet by mouth Daily., Disp: , Rfl:     metFORMIN (GLUCOPHAGE) 500 MG tablet, Take 1 tablet by mouth Daily With Breakfast., Disp: , Rfl:     metoprolol succinate XL (TOPROL-XL) 25 MG 24 hr tablet, Take 2 tablets by mouth 2 (Two) Times a Day., Disp: , Rfl:     mycophenolate (CELLCEPT) 500 MG tablet, Take 1 tablet by mouth 4 (Four) Times a Day., Disp: , Rfl:     nitrofurantoin, macrocrystal-monohydrate, (MACROBID) 100 MG capsule, , Disp: , Rfl:     omeprazole (priLOSEC) 20 MG capsule, Take 1 capsule by mouth Daily., Disp: , Rfl:     ondansetron (ZOFRAN) 4 MG tablet, Take 1 tablet by mouth Every 8 (Eight) Hours As Needed for Nausea or Vomiting., Disp: , Rfl:     rOPINIRole (REQUIP) 1 MG tablet, Take 1 tablet by mouth Every Night., Disp: , Rfl:     Vibegron 75 MG tablet, Take 1 tablet by mouth Daily., Disp: , Rfl:     vitamin B-12 (CYANOCOBALAMIN) 500 MCG tablet, Take 1 tablet by mouth Every Other Day., Disp: , Rfl:     Past Medical History:   Diagnosis Date    Anemia     Anxiety 2022    Arthritis 2010    Knees and hips    Carotid bruit     Chronic kidney disease 2016    See kidney specialist yearly    Claustrophobia 2001    Diabetes mellitus     GERD (gastroesophageal reflux disease)     Hyperlipidemia     Hypertension     Sarcoidosis        Past Surgical History:   Procedure Laterality Date    ANKLE SURGERY Left     fracture repair w/ two screws    COLONOSCOPY      COLONOSCOPY N/A 03/23/2021    Procedure: COLONOSCOPY WITH ANESTHESIA;  Surgeon: Francis Elmore DO;  Location: [facility] ENDOSCOPY;  Service: Gastroenterology;  Laterality: N/A;  pre: hx of colon polyps  post: normal  Blade  "GUNNAR Aguiar    TOTAL KNEE ARTHROPLASTY Right     x3       Social History     Socioeconomic History    Marital status:    Tobacco Use    Smoking status: Never     Passive exposure: Never    Smokeless tobacco: Never   Vaping Use    Vaping status: Never Used   Substance and Sexual Activity    Alcohol use: No    Drug use: No    Sexual activity: Never       Family History   Problem Relation Age of Onset    Cancer Mother         colon    Colon cancer Mother     Diabetes Brother     Atrial fibrillation Brother     Esophageal cancer Neg Hx        Objective    Temp 97.8 °F (36.6 °C)   Ht 165 cm (64.96\")   Wt 90.3 kg (199 lb 1.6 oz)   BMI 33.17 kg/m²     Physical Exam  Nursing note reviewed.   Constitutional:       General: She is not in acute distress.     Appearance: Normal appearance. She is not ill-appearing.   HENT:      Nose: No congestion.   Abdominal:      Tenderness: There is no right CVA tenderness or left CVA tenderness.      Hernia: No hernia is present.   Skin:     General: Skin is warm and dry.   Neurological:      Mental Status: She is alert and oriented to person, place, and time.   Psychiatric:         Mood and Affect: Mood normal.         Behavior: Behavior normal.             Results for orders placed or performed in visit on 09/03/24   POC Urinalysis Dipstick, Multipro    Specimen: Urine   Result Value Ref Range    Color Yellow Yellow, Straw, Dark Yellow, Maria Del Rosario    Clarity, UA Clear Clear    Glucose, UA Negative Negative mg/dL    Bilirubin Negative Negative    Ketones, UA Negative Negative    Specific Gravity  1.015 1.005 - 1.030    Blood, UA Trace (A) Negative    pH, Urine 6.0 5.0 - 8.0    Protein, POC Negative Negative mg/dL    Urobilinogen, UA 0.2 E.U./dL Normal, 0.2 E.U./dL    Nitrite, UA Positive (A) Negative    Leukocytes Negative Negative   Estimation of residual urine via abdominal ultrasound  Residual Urine: 229 ml  Indication: incontinence  Position: Supine  Examination: Incremental " scanning of the suprapubic area using 3 MHz transducer using copious amounts of acoustic gel.   Findings: An anechoic area was demonstrated which represented the bladder, with measurement of residual urine as noted. I inspected this myself. In that the residual urine was stable or insignificant, no treatment will be necessary at this time.      Assessment and Plan    Diagnoses and all orders for this visit:    1. Overactive bladder (Primary)  -     POC Urinalysis Dipstick, Multipro        Again found to be retaining on postvoid residual this time even more at 229 mL.  Based on this finding recommend patient use the Gemtesa every other day.  Would like a follow-up in 3 months to again check a postvoid residual.  Ultimately patient may not be able to tolerate overactive bladder medication.    Of also encouraged patient to start back with more frequent vaginal estrogen cream usage as she states she has been using approximately 1 day weekly.  Continue previously prescribed Macrobid as urine is showing positive for bacteria.

## 2024-09-03 ENCOUNTER — OFFICE VISIT (OUTPATIENT)
Dept: UROLOGY | Facility: CLINIC | Age: 74
End: 2024-09-03
Payer: MEDICARE

## 2024-09-03 VITALS — WEIGHT: 199.1 LBS | HEIGHT: 65 IN | BODY MASS INDEX: 33.17 KG/M2 | TEMPERATURE: 97.8 F

## 2024-09-03 DIAGNOSIS — N32.81 OVERACTIVE BLADDER: Primary | ICD-10-CM

## 2024-09-03 LAB
BILIRUB BLD-MCNC: NEGATIVE MG/DL
CLARITY, POC: CLEAR
COLOR UR: YELLOW
GLUCOSE UR STRIP-MCNC: NEGATIVE MG/DL
KETONES UR QL: NEGATIVE
LEUKOCYTE EST, POC: NEGATIVE
NITRITE UR-MCNC: POSITIVE MG/ML
PH UR: 6 [PH] (ref 5–8)
PROT UR STRIP-MCNC: NEGATIVE MG/DL
RBC # UR STRIP: ABNORMAL /UL
SP GR UR: 1.01 (ref 1–1.03)
UROBILINOGEN UR QL: ABNORMAL

## 2024-09-03 RX ORDER — NITROFURANTOIN 25; 75 MG/1; MG/1
CAPSULE ORAL
COMMUNITY
Start: 2024-08-31

## 2024-09-03 RX ORDER — FLUCONAZOLE 150 MG/1
TABLET ORAL
COMMUNITY
Start: 2024-08-31

## 2024-09-03 RX ORDER — HYDROCHLOROTHIAZIDE 12.5 MG/1
CAPSULE ORAL
COMMUNITY
Start: 2024-08-05

## 2024-09-16 DIAGNOSIS — N32.81 OVERACTIVE BLADDER: Primary | ICD-10-CM

## 2024-09-17 ENCOUNTER — TELEPHONE (OUTPATIENT)
Age: 74
End: 2024-09-17
Payer: MEDICARE

## 2024-10-24 ENCOUNTER — TELEPHONE (OUTPATIENT)
Dept: VASCULAR SURGERY | Facility: CLINIC | Age: 74
End: 2024-10-24
Payer: MEDICARE

## 2024-10-25 ENCOUNTER — HOSPITAL ENCOUNTER (OUTPATIENT)
Dept: ULTRASOUND IMAGING | Facility: HOSPITAL | Age: 74
Discharge: HOME | End: 2024-10-25
Payer: MEDICARE

## 2024-10-25 ENCOUNTER — OFFICE VISIT (OUTPATIENT)
Dept: VASCULAR SURGERY | Facility: CLINIC | Age: 74
End: 2024-10-25
Payer: MEDICARE

## 2024-10-25 VITALS
HEART RATE: 86 BPM | HEIGHT: 65 IN | DIASTOLIC BLOOD PRESSURE: 80 MMHG | WEIGHT: 197 LBS | SYSTOLIC BLOOD PRESSURE: 152 MMHG | OXYGEN SATURATION: 97 % | BODY MASS INDEX: 32.82 KG/M2

## 2024-10-25 DIAGNOSIS — I65.23 BILATERAL CAROTID ARTERY STENOSIS: ICD-10-CM

## 2024-10-25 DIAGNOSIS — I10 ESSENTIAL HYPERTENSION: ICD-10-CM

## 2024-10-25 DIAGNOSIS — I65.23 BILATERAL CAROTID ARTERY STENOSIS: Primary | ICD-10-CM

## 2024-10-25 DIAGNOSIS — N32.81 OVERACTIVE BLADDER: Primary | ICD-10-CM

## 2024-10-25 DIAGNOSIS — E78.5 HYPERLIPIDEMIA, UNSPECIFIED HYPERLIPIDEMIA TYPE: ICD-10-CM

## 2024-10-25 PROCEDURE — 93880 EXTRACRANIAL BILAT STUDY: CPT

## 2024-10-25 PROCEDURE — 99214 OFFICE O/P EST MOD 30 MIN: CPT | Performed by: NURSE PRACTITIONER

## 2024-10-25 PROCEDURE — 3077F SYST BP >= 140 MM HG: CPT | Performed by: NURSE PRACTITIONER

## 2024-10-25 PROCEDURE — 1160F RVW MEDS BY RX/DR IN RCRD: CPT | Performed by: NURSE PRACTITIONER

## 2024-10-25 PROCEDURE — 1159F MED LIST DOCD IN RCRD: CPT | Performed by: NURSE PRACTITIONER

## 2024-10-25 PROCEDURE — 3079F DIAST BP 80-89 MM HG: CPT | Performed by: NURSE PRACTITIONER

## 2024-10-25 NOTE — LETTER
"October 25, 2024     Crow Landa MD  [address] [facility] [address]    Patient: Radha Rodriguez   YOB: 1950   Date of Visit: 10/25/2024     Dear Crow Landa MD:       Thank you for referring Radha Rodriguez to me for evaluation. Below are the relevant portions of my assessment and plan of care.    If you have questions, please do not hesitate to call me. I look forward to following Radha along with you.         Sincerely,        GUNNAR Damon        CC: No Recipients    Debbie Reeder APRN  10/25/24 1135  Sign when Signing Visit  10/25/2024       Crow Landa MD   [address] [place] [id number]    Radha Rodriguez  1950    Chief Complaint   Patient presents with   • Follow-up     1 year follow up w/ testing. Last seen 11/1/23  Patient denies any stroke type symptoms.        Dear Crow Landa MD       HPI  I had the pleasure of seeing your patient Radha Rodriguez in the office today.   As you recall, Radha Rodriguez is a 74 y.o.  female who we are following for asymptomatic carotid occlusive disease.  Currently she is doing well and denies any strokelike symptoms.  She is maintained on aspirin and Lipitor.  She did have right knee surgery she is recovering from she did have noninvasive testing performed today, which I did review in office.        Review of Systems   Constitutional: Negative.    HENT: Negative.     Eyes: Negative.    Respiratory: Negative.     Cardiovascular: Negative.    Gastrointestinal: Negative.    Endocrine: Negative.    Genitourinary: Negative.  Positive for frequency (incontinence).   Musculoskeletal: Negative.  Positive for arthralgias, gait problem and joint swelling.   Skin: Negative.    Allergic/Immunologic: Negative.    Neurological: Negative.    Hematological: Negative.    Psychiatric/Behavioral: Negative.          /80   Pulse 86   Ht 165.1 cm (65\")   Wt 89.4 kg (197 lb)   SpO2 97%   BMI 32.78 kg/m² "   Physical Exam  Vitals and nursing note reviewed.   Constitutional:       General: She is not in acute distress.     Appearance: Normal appearance. She is well-developed. She is obese. She is not diaphoretic.   HENT:      Head: Normocephalic and atraumatic.   Eyes:      General: No scleral icterus.     Pupils: Pupils are equal, round, and reactive to light.   Neck:      Thyroid: No thyromegaly.      Vascular: No carotid bruit or JVD.   Cardiovascular:      Rate and Rhythm: Normal rate and regular rhythm.      Pulses: Normal pulses.      Heart sounds: Normal heart sounds and S2 normal. No murmur heard.     No friction rub. No gallop.      Comments: Varicose veins to bilateral lower extremities  Pulmonary:      Effort: Pulmonary effort is normal.      Breath sounds: Normal breath sounds.   Abdominal:      General: Bowel sounds are normal.      Palpations: Abdomen is soft.   Musculoskeletal:      Cervical back: Normal range of motion and neck supple.      Comments: Right knee swollen from recent surgery, using a cane   Skin:     General: Skin is warm and dry.   Neurological:      General: No focal deficit present.      Mental Status: She is alert and oriented to person, place, and time.      Cranial Nerves: No cranial nerve deficit.   Psychiatric:         Mood and Affect: Mood normal.         Behavior: Behavior normal.         Thought Content: Thought content normal.         Judgment: Judgment normal.           Diagnostic Data:  Noninvasive testing including a carotid duplex shows less than 50% carotid stenosis bilaterally with bilateral antegrade vertebral flow.      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 without presence of antibiotic-impregnated cement spacer   • Anxiety   • Carotid  stenosis   • Cholangiocarcinoma   • Chronic kidney disease   • Degeneration of lumbar intervertebral disc   • Diabetic neuropathy   • Dyslipidemia   • GERD (gastroesophageal reflux disease)   • Sarcoidosis   • Iron deficiency anemia due to chronic blood loss   • Lumbar spinal stenosis   • Numbness and tingling in both hands   • Obesity   • Arthritis   • Restless legs syndrome   • Vitamin D deficiency   • Hallux valgus, right   • Hammer toe of right foot   • Plantar plate injury, right, initial encounter         ICD-10-CM ICD-9-CM   1. Bilateral carotid artery stenosis  I65.23 433.10     433.30   2. Essential hypertension  I10 401.9   3. Hyperlipidemia, unspecified hyperlipidemia type  E78.5 272.4   4. BMI 32.0-32.9,adult  Z68.32 V85.32           Plan: After thoroughly evaluating Radha Rodriguez, I believe the best course of action is to remain conservative from vascular surgery standpoint.  Currently she is doing well and denies any strokelike symptoms.  I did review her testing which shows less than 50% carotid stenosis bilaterally.  We will see her back in 1 year with repeat noninvasive testing for continued surveillance, including a carotid duplex.  I did discuss vascular risk factors as they pertain to the progression of vascular disease including controlling her hypertension, hyperlipidemia, and diabetes.  Her blood pressure is elevated in office today at 152/80.  She was started on a new medication recently which she feels has helped.  I recommend she monitor and follow-up with PCP regarding.  She was started on medication for her urinary incontinence and unfortunately insurance does not cover this.  She is going to discuss with urology putting her on another medication which I have contacted Hina Monique and her MA will contact the patient. she should continue her aspirin 81 mg daily, Lipitor 10 mg daily in addition to her other medications.  I do not have any recent labs to review regarding her diabetes but she  reports this is well-controlled.  Body mass index is 32.78 kg/m².  BMI is >= 30 and <35. (Class 1 Obesity). The following options were offered after discussion;: Diet and exercise to healthy BMI  This was all discussed in full with complete understanding.     Thank you for allowing me to participate in the care of your patient.  Please do not hesitate with any questions or concerns.  I will keep you aware of any further encounters with Radha Rodriguez.        Sincerely yours,         GUNNAR Damon Jonathan Patmor, MD

## 2024-10-25 NOTE — PROGRESS NOTES
"10/25/2024       Crow Landa MD   [address] [facility] [address]    Radha Rodriguez  1950    Chief Complaint   Patient presents with    Follow-up     1 year follow up w/ testing. Last seen 11/1/23  Patient denies any stroke type symptoms.        Dear Crow Landa MD       HPI  I had the pleasure of seeing your patient Radha Rodriguez in the office today.   As you recall, Radha Rodriguez is a 74 y.o.  female who we are following for asymptomatic carotid occlusive disease.  Currently she is doing well and denies any strokelike symptoms.  She is maintained on aspirin and Lipitor.  She did have right knee surgery she is recovering from she did have noninvasive testing performed today, which I did review in office.        Review of Systems   Constitutional: Negative.    HENT: Negative.     Eyes: Negative.    Respiratory: Negative.     Cardiovascular: Negative.    Gastrointestinal: Negative.    Endocrine: Negative.    Genitourinary: Negative.  Positive for frequency (incontinence).   Musculoskeletal: Negative.  Positive for arthralgias, gait problem and joint swelling.   Skin: Negative.    Allergic/Immunologic: Negative.    Neurological: Negative.    Hematological: Negative.    Psychiatric/Behavioral: Negative.          /80   Pulse 86   Ht 165.1 cm (65\")   Wt 89.4 kg (197 lb)   SpO2 97%   BMI 32.78 kg/m²   Physical Exam  Vitals and nursing note reviewed.   Constitutional:       General: She is not in acute distress.     Appearance: Normal appearance. She is well-developed. She is obese. She is not diaphoretic.   HENT:      Head: Normocephalic and atraumatic.   Eyes:      General: No scleral icterus.     Pupils: Pupils are equal, round, and reactive to light.   Neck:      Thyroid: No thyromegaly.      Vascular: No carotid bruit or JVD.   Cardiovascular:      Rate and Rhythm: Normal rate and regular rhythm.      Pulses: Normal pulses.      Heart sounds: Normal heart sounds and S2 normal. " No murmur heard.     No friction rub. No gallop.      Comments: Varicose veins to bilateral lower extremities  Pulmonary:      Effort: Pulmonary effort is normal.      Breath sounds: Normal breath sounds.   Abdominal:      General: Bowel sounds are normal.      Palpations: Abdomen is soft.   Musculoskeletal:      Cervical back: Normal range of motion and neck supple.      Comments: Right knee swollen from recent surgery, using a cane   Skin:     General: Skin is warm and dry.   Neurological:      General: No focal deficit present.      Mental Status: She is alert and oriented to person, place, and time.      Cranial Nerves: No cranial nerve deficit.   Psychiatric:         Mood and Affect: Mood normal.         Behavior: Behavior normal.         Thought Content: Thought content normal.         Judgment: Judgment normal.           Diagnostic Data:  Noninvasive testing including a carotid duplex shows less than 50% carotid stenosis bilaterally with bilateral antegrade vertebral flow.      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 without presence of antibiotic-impregnated cement spacer    Anxiety    Carotid stenosis    Cholangiocarcinoma    Chronic kidney disease    Degeneration of lumbar intervertebral disc    Diabetic neuropathy    Dyslipidemia    GERD (gastroesophageal reflux disease)    Sarcoidosis    Iron deficiency anemia due to chronic blood loss    Lumbar spinal stenosis    Numbness and tingling in both hands    Obesity    Arthritis    Restless legs syndrome    Vitamin D deficiency    Hallux valgus, right    Hammer toe of right foot    Plantar plate injury, right, initial encounter         ICD-10-CM ICD-9-CM   1. Bilateral carotid artery stenosis  I65.23 433.10     433.30   2. Essential  hypertension  I10 401.9   3. Hyperlipidemia, unspecified hyperlipidemia type  E78.5 272.4   4. BMI 32.0-32.9,adult  Z68.32 V85.32           Plan: After thoroughly evaluating Radha Rodriguez, I believe the best course of action is to remain conservative from vascular surgery standpoint.  Currently she is doing well and denies any strokelike symptoms.  I did review her testing which shows less than 50% carotid stenosis bilaterally.  We will see her back in 1 year with repeat noninvasive testing for continued surveillance, including a carotid duplex.  I did discuss vascular risk factors as they pertain to the progression of vascular disease including controlling her hypertension, hyperlipidemia, and diabetes.  Her blood pressure is elevated in office today at 152/80.  She was started on a new medication recently which she feels has helped.  I recommend she monitor and follow-up with PCP regarding.  She was started on medication for her urinary incontinence and unfortunately insurance does not cover this.  She is going to discuss with urology putting her on another medication which I have contacted Hina Monique and her MA will contact the patient. she should continue her aspirin 81 mg daily, Lipitor 10 mg daily in addition to her other medications.  I do not have any recent labs to review regarding her diabetes but she reports this is well-controlled.  Body mass index is 32.78 kg/m².  BMI is >= 30 and <35. (Class 1 Obesity). The following options were offered after discussion;: Diet and exercise to healthy BMI  This was all discussed in full with complete understanding.     Thank you for allowing me to participate in the care of your patient.  Please do not hesitate with any questions or concerns.  I will keep you aware of any further encounters with Radha Rodriguez.        Sincerely yours,         GUNNAR Damon Jonathan Patmor, MD

## 2024-10-30 ENCOUNTER — TELEPHONE (OUTPATIENT)
Dept: UROLOGY | Facility: CLINIC | Age: 74
End: 2024-10-30
Payer: MEDICARE

## 2024-11-22 NOTE — PROGRESS NOTES
Subjective    Ms. Rodriguez is 74 y.o. female    Chief Complaint: OAB, urge incontinence follow up    History of Present Illness    74-year-old female established patient in for follow-up regarding urge incontinence was started on Gemtesa as had previously tried and failed multiple anticholinergics as well as Myrbetriq.  Not only where meds not effective patient also unable to tolerate as was causing urinary retention.  Patient reporting he is currently out of the Gemtesa as she ran out of samples and is unable to afford the prescription as insurance is not wanting to cover and patient not eligible for the discount program that was tried.  Is back to requiring 5-6 depends daily.    The following portions of the patient's history were reviewed and updated as appropriate: allergies, current medications, past family history, past medical history, past social history, past surgical history and problem list.    Review of Systems   Constitutional:  Negative for chills and fever.   Gastrointestinal:  Negative for abdominal pain, anal bleeding, blood in stool, nausea and vomiting.   Genitourinary:  Positive for frequency and urgency. Negative for dysuria and hematuria.         Current Outpatient Medications:     acetaminophen (TYLENOL) 650 MG 8 hr tablet, Take 350 mg by mouth Every 8 (Eight) Hours As Needed for Mild Pain. Rapid release PRn, Disp: , Rfl:     aspirin 81 MG EC tablet, 1 tablet Daily., Disp: , Rfl:     atorvastatin (LIPITOR) 10 MG tablet, Take 1 tablet by mouth Daily., Disp: , Rfl:     Coenzyme Q10 (Co Q 10) 100 MG capsule, Take 200 mg by mouth Daily., Disp: , Rfl:     cyclobenzaprine (FLEXERIL) 5 MG tablet, Take 1 tablet by mouth 2 (Two) Times a Day As Needed., Disp: , Rfl:     estradiol (ESTRACE) 0.1 MG/GM vaginal cream, estradiol 0.01% (0.1 mg/gram) vaginal cream, Disp: , Rfl:     ferrous sulfate 324 (65 Fe) MG tablet delayed-release EC tablet, Take 1 tablet by mouth Every Other Day., Disp: , Rfl:      hydroCHLOROthiazide (MICROZIDE) 12.5 MG capsule, , Disp: , Rfl:     HYDROcodone-acetaminophen (NORCO) 5-325 MG per tablet, Take 1 tablet by mouth Every 12 (Twelve) Hours As Needed (states takes every now and then)., Disp: , Rfl:     levocetirizine (XYZAL) 5 MG tablet, Take 1 tablet by mouth As Needed for Allergies., Disp: , Rfl:     losartan (COZAAR) 100 MG tablet, Take 1 tablet by mouth Daily., Disp: , Rfl:     metFORMIN (GLUCOPHAGE) 500 MG tablet, Take 1 tablet by mouth Daily With Breakfast., Disp: , Rfl:     metoprolol succinate XL (TOPROL-XL) 25 MG 24 hr tablet, Take 2 tablets by mouth 2 (Two) Times a Day., Disp: , Rfl:     mycophenolate (CELLCEPT) 500 MG tablet, Take 1 tablet by mouth 4 (Four) Times a Day., Disp: , Rfl:     omeprazole (priLOSEC) 20 MG capsule, Take 1 capsule by mouth Daily., Disp: , Rfl:     ondansetron (ZOFRAN) 4 MG tablet, Take 1 tablet by mouth Every 8 (Eight) Hours As Needed for Nausea or Vomiting., Disp: , Rfl:     rOPINIRole (REQUIP) 1 MG tablet, Take 1 tablet by mouth Every Night., Disp: , Rfl:     vitamin B-12 (CYANOCOBALAMIN) 500 MCG tablet, Take 1 tablet by mouth Every Other Day., Disp: , Rfl:     Past Medical History:   Diagnosis Date    Anemia     Anxiety 2022    Arthritis 2010    Knees and hips    Carotid bruit     Chronic kidney disease 2016    See kidney specialist yearly    Claustrophobia 2001    Diabetes mellitus     GERD (gastroesophageal reflux disease)     Hyperlipidemia     Hypertension     Sarcoidosis        Past Surgical History:   Procedure Laterality Date    ANKLE SURGERY Left     fracture repair w/ two screws    COLONOSCOPY      COLONOSCOPY N/A 03/23/2021    Procedure: COLONOSCOPY WITH ANESTHESIA;  Surgeon: Francis Elmore DO;  Location: [facility] ENDOSCOPY;  Service: Gastroenterology;  Laterality: N/A;  pre: hx of colon polyps  post: normal  Joceline Murguia, APRN    TOTAL KNEE ARTHROPLASTY Right 03/2023    x3       Social History     Socioeconomic History    Marital  status:    Tobacco Use    Smoking status: Never     Passive exposure: Never    Smokeless tobacco: Never   Vaping Use    Vaping status: Never Used   Substance and Sexual Activity    Alcohol use: No    Drug use: No    Sexual activity: Never       Family History   Problem Relation Age of Onset    Cancer Mother         colon    Colon cancer Mother     Diabetes Brother     Atrial fibrillation Brother     Esophageal cancer Neg Hx        Objective    There were no vitals taken for this visit.    Physical Exam  Nursing note reviewed.   Constitutional:       General: She is not in acute distress.     Appearance: Normal appearance. She is not ill-appearing.   HENT:      Nose: No congestion.   Abdominal:      Tenderness: There is no right CVA tenderness or left CVA tenderness.      Hernia: No hernia is present.   Skin:     General: Skin is warm and dry.   Neurological:      Mental Status: She is alert and oriented to person, place, and time.   Psychiatric:         Mood and Affect: Mood normal.         Behavior: Behavior normal.             Results for orders placed or performed in visit on 12/03/24   POC Urinalysis Dipstick, Multipro    Collection Time: 12/03/24  2:20 PM    Specimen: Urine   Result Value Ref Range    Color Yellow Yellow, Straw, Dark Yellow, [place]ario    Clarity, UA Clear Clear    Glucose, UA Negative Negative mg/dL    Bilirubin Negative Negative    Ketones, UA Negative Negative    Specific Gravity  1.030 1.005 - 1.030    Blood, UA Negative Negative    pH, Urine 5.5 5.0 - 8.0    Protein, POC 30 mg/dL (A) Negative mg/dL    Urobilinogen, UA 0.2 E.U./dL Normal, 0.2 E.U./dL    Nitrite, UA Negative Negative    Leukocytes Negative Negative   Estimation of residual urine via abdominal ultrasound  Residual Urine: 8 ml  Indication: incontinence  Position: Supine  Examination: Incremental scanning of the suprapubic area using 3 MHz transducer using copious amounts of acoustic gel.   Findings: An anechoic area was  demonstrated which represented the bladder, with measurement of residual urine as noted. I inspected this myself. In that the residual urine was stable or insignificant, no treatment will be necessary at this time.      Assessment and Plan    Diagnoses and all orders for this visit:    1. Overactive bladder (Primary)  -     POC Urinalysis Dipstick, Multipro      PVR today much improved at 8 mL    Patient not currently on any overactive bladder medications.  Has tried and failed multiple anticholinergics as well as Myrbetriq.  Were ineffective and caused urinary retention.  Has been unable to afford Gemtesa and insurance has not been wanting to cover.      Have provided patient with other options including Botox versus PTNS versus sacral neurostimulator placement.  Patient was provided with brochures on each and will contact our office if interested in proceeding

## 2024-12-03 ENCOUNTER — OFFICE VISIT (OUTPATIENT)
Dept: UROLOGY | Facility: CLINIC | Age: 74
End: 2024-12-03
Payer: MEDICARE

## 2024-12-03 DIAGNOSIS — N32.81 OVERACTIVE BLADDER: Primary | ICD-10-CM

## 2024-12-03 LAB
BILIRUB BLD-MCNC: NEGATIVE MG/DL
CLARITY, POC: CLEAR
COLOR UR: YELLOW
GLUCOSE UR STRIP-MCNC: NEGATIVE MG/DL
KETONES UR QL: NEGATIVE
LEUKOCYTE EST, POC: NEGATIVE
NITRITE UR-MCNC: NEGATIVE MG/ML
PH UR: 5.5 [PH] (ref 5–8)
PROT UR STRIP-MCNC: ABNORMAL MG/DL
RBC # UR STRIP: NEGATIVE /UL
SP GR UR: 1.03 (ref 1–1.03)
UROBILINOGEN UR QL: ABNORMAL

## (undated) DEVICE — DRESSING FOAM W4XL4IN SIL FACE BORD ADH PD SUP ABSRB COR

## (undated) DEVICE — 3 BONE CEMENT MIXER WITH SMALL SPATULA: Brand: MIXEVAC

## (undated) DEVICE — SOLUTION IV 1000ML LAC RINGERS PH 6.5 INJ USP VIAFLX PLAS

## (undated) DEVICE — SHEET,DRAPE,53X77,STERILE: Brand: MEDLINE

## (undated) DEVICE — C-ARMOR C-ARM EQUIPMENT COVERS CLEAR STERILE UNIVERSAL FIT 12 PER CASE: Brand: C-ARMOR

## (undated) DEVICE — BANDAGE COMPR W3INXL15FT BGE E SGL LAYERED CLP CLSR

## (undated) DEVICE — SOLUTION IRRIG 3000ML 0.9% SOD CHL USP UROMATIC PLAS CONT

## (undated) DEVICE — MASK,OXYGEN,MED CONC,ADLT,7' TUB, UC: Brand: PENDING

## (undated) DEVICE — C-ARM: Brand: UNBRANDED

## (undated) DEVICE — GLOVE SURG SZ 85 CRM LTX FREE POLYISOPRENE POLYMER BEAD ANTI

## (undated) DEVICE — SUTURE ABSRB BRAID COAT UD CP NO 2 27IN VCRL J195H

## (undated) DEVICE — SUTURE VCRL SZ 2-0 L36IN ABSRB UD L36MM CT-1 1/2 CIR J945H

## (undated) DEVICE — DUAL CUT SAGITTAL BLADE

## (undated) DEVICE — GOWN,PRECEPT,XLNG/XXLARGE,STRL: Brand: MEDLINE

## (undated) DEVICE — WAX SURG 2.5GM HEMSTAT BNE BEESWAX PARAFFIN ISO PALMITATE

## (undated) DEVICE — Device: Brand: DEFENDO AIR/WATER/SUCTION AND BIOPSY VALVE

## (undated) DEVICE — DRAIN SURG FLAT W7MMXL20CM FULL PERF

## (undated) DEVICE — DRESSING FOAM W4XL12IN SIL RECT ADH WTRPRF FLM BK W/ BORD

## (undated) DEVICE — YANKAUER,BULB TIP WITH VENT: Brand: ARGYLE

## (undated) DEVICE — CHLORAPREP 26ML ORANGE

## (undated) DEVICE — SET IV PMP 1 PRT CK VLV SPL SEPT PRTS 2 PC M LL 20 GTT LEN

## (undated) DEVICE — LARYNGOSCOPE BLDE MAC HNDL M SZ 35 ST CURAPLEX CURAVIEW LED

## (undated) DEVICE — SPONGE LAP W18XL18IN WHT COT 4 PLY FLD STRUNG RADPQ DISP ST

## (undated) DEVICE — CANNULA NSL AD L7FT DIV O2 CO2 W/ M LUERLOCK TRMPT CONN

## (undated) DEVICE — SUTURE PERMAHAND SZ 0 L30IN NONABSORBABLE BLK L26MM SH 1/2 K834H

## (undated) DEVICE — SOLUTION IV IRRIG POUR BRL 0.9% SODIUM CHL 2F7124

## (undated) DEVICE — 6.3MM ROUND FAST CUTTING BUR

## (undated) DEVICE — ZIMMER® STERILE DISPOSABLE TOURNIQUET CUFF WITH PLC, DUAL PORT, SINGLE BLADDER, 34 IN. (86 CM)

## (undated) DEVICE — TBG SMPL FLTR LINE NASL 02/C02 A/ BX/100

## (undated) DEVICE — GLOVE SURG SZ 85 L12IN FNGR THK79MIL GRN LTX FREE

## (undated) DEVICE — THE CHANNEL CLEANING BRUSH IS A NYLON FLEXI BRUSH ATTACHED TO A FLEXIBLE PLASTIC SHEATH DESIGNED TO SAFELY REMOVE DEBRIS FROM FLEXIBLE ENDOSCOPES.

## (undated) DEVICE — JACKSON-PRATT 100CC BULB RESERVOIR: Brand: CARDINAL HEALTH

## (undated) DEVICE — SYSTEM SKIN CLSR 22CM DERMBND PRINEO

## (undated) DEVICE — RECIPROCATING BLADE DOUBLE SIDE (74.0 X 0.77MM)

## (undated) DEVICE — SURGICAL PROCEDURE PACK KNEE TOT DBD CDS LOURDES HOSP LF

## (undated) DEVICE — SENSR O2 OXIMAX FNGR A/ 18IN NONSTR

## (undated) DEVICE — GOWN,PREVENTION PLUS,XL,ST,24/CS: Brand: MEDLINE

## (undated) DEVICE — CONTAINER,SPECIMEN,OR STERILE,4OZ: Brand: MEDLINE

## (undated) DEVICE — TUBE ET 7MM NSL ORAL BASIC CUF INTMED MURPHY EYE RADPQ MRK

## (undated) DEVICE — 3M™ IOBAN™ 2 ANTIMICROBIAL INCISE DRAPE 6651EZ: Brand: IOBAN™ 2

## (undated) DEVICE — SUTURE VCRL SZ 3-0 L27IN ABSRB UD L19MM PS-2 3/8 CIR PRIM J427H